# Patient Record
Sex: FEMALE | Race: BLACK OR AFRICAN AMERICAN | Employment: FULL TIME | URBAN - METROPOLITAN AREA
[De-identification: names, ages, dates, MRNs, and addresses within clinical notes are randomized per-mention and may not be internally consistent; named-entity substitution may affect disease eponyms.]

---

## 2017-02-23 ENCOUNTER — LAB ENCOUNTER (OUTPATIENT)
Dept: LAB | Facility: HOSPITAL | Age: 52
End: 2017-02-23
Attending: INTERNAL MEDICINE
Payer: COMMERCIAL

## 2017-02-23 ENCOUNTER — OFFICE VISIT (OUTPATIENT)
Dept: INTERNAL MEDICINE CLINIC | Facility: CLINIC | Age: 52
End: 2017-02-23

## 2017-02-23 VITALS
WEIGHT: 222 LBS | DIASTOLIC BLOOD PRESSURE: 74 MMHG | HEIGHT: 69 IN | RESPIRATION RATE: 18 BRPM | SYSTOLIC BLOOD PRESSURE: 112 MMHG | HEART RATE: 78 BPM | BODY MASS INDEX: 32.88 KG/M2

## 2017-02-23 DIAGNOSIS — Z12.31 VISIT FOR SCREENING MAMMOGRAM: ICD-10-CM

## 2017-02-23 DIAGNOSIS — J45.990 EXERCISE-INDUCED ASTHMA: ICD-10-CM

## 2017-02-23 DIAGNOSIS — Z00.00 ROUTINE HEALTH MAINTENANCE: ICD-10-CM

## 2017-02-23 DIAGNOSIS — N94.6 DYSMENORRHEA: ICD-10-CM

## 2017-02-23 DIAGNOSIS — I10 ESSENTIAL HYPERTENSION: Primary | ICD-10-CM

## 2017-02-23 PROBLEM — Z52.4 KIDNEY DONOR: Status: ACTIVE | Noted: 2017-02-23

## 2017-02-23 LAB
ALBUMIN SERPL BCP-MCNC: 3.6 G/DL (ref 3.5–4.8)
ALBUMIN/GLOB SERPL: 1.1 {RATIO} (ref 1–2)
ALP SERPL-CCNC: 58 U/L (ref 32–100)
ALT SERPL-CCNC: 13 U/L (ref 14–54)
ANION GAP SERPL CALC-SCNC: 7 MMOL/L (ref 0–18)
AST SERPL-CCNC: 17 U/L (ref 15–41)
BASOPHILS # BLD: 0 K/UL (ref 0–0.2)
BASOPHILS NFR BLD: 1 %
BILIRUB SERPL-MCNC: 0.8 MG/DL (ref 0.3–1.2)
BUN SERPL-MCNC: 8 MG/DL (ref 8–20)
BUN/CREAT SERPL: 7.8 (ref 10–20)
CALCIUM SERPL-MCNC: 9 MG/DL (ref 8.5–10.5)
CHLORIDE SERPL-SCNC: 106 MMOL/L (ref 95–110)
CHOLEST SERPL-MCNC: 121 MG/DL (ref 110–200)
CO2 SERPL-SCNC: 26 MMOL/L (ref 22–32)
CREAT SERPL-MCNC: 1.03 MG/DL (ref 0.5–1.5)
EOSINOPHIL # BLD: 0.2 K/UL (ref 0–0.7)
EOSINOPHIL NFR BLD: 2 %
ERYTHROCYTE [DISTWIDTH] IN BLOOD BY AUTOMATED COUNT: 18 % (ref 11–15)
GLOBULIN PLAS-MCNC: 3.4 G/DL (ref 2.5–3.7)
GLUCOSE SERPL-MCNC: 91 MG/DL (ref 70–99)
HCT VFR BLD AUTO: 33.2 % (ref 35–48)
HDLC SERPL-MCNC: 38 MG/DL
HGB BLD-MCNC: 10.4 G/DL (ref 12–16)
LDLC SERPL CALC-MCNC: 70 MG/DL (ref 0–99)
LYMPHOCYTES # BLD: 2.4 K/UL (ref 1–4)
LYMPHOCYTES NFR BLD: 34 %
MCH RBC QN AUTO: 23.6 PG (ref 27–32)
MCHC RBC AUTO-ENTMCNC: 31.3 G/DL (ref 32–37)
MCV RBC AUTO: 75.3 FL (ref 80–100)
MONOCYTES # BLD: 0.6 K/UL (ref 0–1)
MONOCYTES NFR BLD: 8 %
NEUTROPHILS # BLD AUTO: 3.9 K/UL (ref 1.8–7.7)
NEUTROPHILS NFR BLD: 55 %
NONHDLC SERPL-MCNC: 83 MG/DL
OSMOLALITY UR CALC.SUM OF ELEC: 286 MOSM/KG (ref 275–295)
PLATELET # BLD AUTO: 284 K/UL (ref 140–400)
PMV BLD AUTO: 9 FL (ref 7.4–10.3)
POTASSIUM SERPL-SCNC: 4 MMOL/L (ref 3.3–5.1)
PROT SERPL-MCNC: 7 G/DL (ref 5.9–8.4)
RBC # BLD AUTO: 4.41 M/UL (ref 3.7–5.4)
SODIUM SERPL-SCNC: 139 MMOL/L (ref 136–144)
TRIGL SERPL-MCNC: 66 MG/DL (ref 1–149)
TSH SERPL-ACNC: 1.78 UIU/ML (ref 0.34–5.6)
WBC # BLD AUTO: 7.1 K/UL (ref 4–11)

## 2017-02-23 PROCEDURE — 84443 ASSAY THYROID STIM HORMONE: CPT

## 2017-02-23 PROCEDURE — 85025 COMPLETE CBC W/AUTO DIFF WBC: CPT

## 2017-02-23 PROCEDURE — 80061 LIPID PANEL: CPT

## 2017-02-23 PROCEDURE — 99214 OFFICE O/P EST MOD 30 MIN: CPT | Performed by: INTERNAL MEDICINE

## 2017-02-23 PROCEDURE — 36415 COLL VENOUS BLD VENIPUNCTURE: CPT

## 2017-02-23 PROCEDURE — 99212 OFFICE O/P EST SF 10 MIN: CPT | Performed by: INTERNAL MEDICINE

## 2017-02-23 PROCEDURE — 80053 COMPREHEN METABOLIC PANEL: CPT

## 2017-02-23 RX ORDER — ALBUTEROL SULFATE 90 UG/1
2 AEROSOL, METERED RESPIRATORY (INHALATION) EVERY 4 HOURS PRN
Qty: 1 INHALER | Refills: 0 | OUTPATIENT
Start: 2017-02-23 | End: 2018-09-18

## 2017-02-25 ENCOUNTER — TELEPHONE (OUTPATIENT)
Dept: INTERNAL MEDICINE CLINIC | Facility: CLINIC | Age: 52
End: 2017-02-25

## 2017-02-25 DIAGNOSIS — D64.9 ANEMIA, UNSPECIFIED TYPE: Primary | ICD-10-CM

## 2017-03-01 ENCOUNTER — TELEPHONE (OUTPATIENT)
Dept: INTERNAL MEDICINE CLINIC | Facility: CLINIC | Age: 52
End: 2017-03-01

## 2017-03-01 DIAGNOSIS — D64.9 ANEMIA, UNSPECIFIED TYPE: Primary | ICD-10-CM

## 2017-03-01 NOTE — TELEPHONE ENCOUNTER
Pt states given Triage's direct line by on-site staff member to call and inquire about lab results. Pt states she had sent a [employer] message inquiring about results.  Since I was unable to see a MyChart encounter I contacted Epic and they clarified that the

## 2017-03-02 ENCOUNTER — TELEPHONE (OUTPATIENT)
Dept: INTERNAL MEDICINE CLINIC | Facility: CLINIC | Age: 52
End: 2017-03-02

## 2017-03-02 NOTE — TELEPHONE ENCOUNTER
Call pt: Your blood sugar, kidney, liver, electrolytes, and thyroid tests were all normal.  Your cholesterol panel was excellent! You were anemic. It is probably due to the heavy periods which you had before you had your fibroids treated.   I tried to ad

## 2017-03-02 NOTE — TELEPHONE ENCOUNTER
[employer] form faxed and fax confirmation received. Copy mailed to patient and original sent for scanning.

## 2017-03-03 ENCOUNTER — APPOINTMENT (OUTPATIENT)
Dept: LAB | Facility: HOSPITAL | Age: 52
End: 2017-03-03
Attending: INTERNAL MEDICINE
Payer: COMMERCIAL

## 2017-03-03 DIAGNOSIS — D64.9 ANEMIA, UNSPECIFIED TYPE: ICD-10-CM

## 2017-03-03 LAB
FERRITIN SERPL IA-MCNC: 10 NG/ML (ref 11–307)
FOLATE SERPL-MCNC: 14.2 NG/ML
IRON SATN MFR SERPL: 5 % (ref 15–50)
IRON SERPL-MCNC: 17 MCG/DL (ref 28–170)
TIBC SERPL-MCNC: 360 MCG/DL (ref 228–428)
TRANSFERRIN SERPL-MCNC: 273 MG/DL (ref 192–382)
VIT B12 SERPL-MCNC: 310 PG/ML (ref 181–914)

## 2017-03-03 PROCEDURE — 82746 ASSAY OF FOLIC ACID SERUM: CPT

## 2017-03-03 PROCEDURE — 36415 COLL VENOUS BLD VENIPUNCTURE: CPT

## 2017-03-03 PROCEDURE — 82728 ASSAY OF FERRITIN: CPT

## 2017-03-03 PROCEDURE — 82607 VITAMIN B-12: CPT

## 2017-03-03 PROCEDURE — 84466 ASSAY OF TRANSFERRIN: CPT

## 2017-03-03 PROCEDURE — 83540 ASSAY OF IRON: CPT

## 2017-03-20 ENCOUNTER — OFFICE VISIT (OUTPATIENT)
Dept: INTERNAL MEDICINE CLINIC | Facility: CLINIC | Age: 52
End: 2017-03-20

## 2017-03-20 ENCOUNTER — APPOINTMENT (OUTPATIENT)
Dept: LAB | Facility: HOSPITAL | Age: 52
End: 2017-03-20
Attending: INTERNAL MEDICINE
Payer: COMMERCIAL

## 2017-03-20 VITALS
HEIGHT: 69 IN | BODY MASS INDEX: 32.05 KG/M2 | DIASTOLIC BLOOD PRESSURE: 73 MMHG | HEART RATE: 90 BPM | RESPIRATION RATE: 18 BRPM | WEIGHT: 216.38 LBS | SYSTOLIC BLOOD PRESSURE: 118 MMHG

## 2017-03-20 DIAGNOSIS — M79.662 PAIN OF LEFT CALF: ICD-10-CM

## 2017-03-20 DIAGNOSIS — Z86.711 HISTORY OF PULMONARY EMBOLISM: ICD-10-CM

## 2017-03-20 DIAGNOSIS — M79.662 PAIN OF LEFT CALF: Primary | ICD-10-CM

## 2017-03-20 DIAGNOSIS — E53.8 B12 DEFICIENCY: ICD-10-CM

## 2017-03-20 LAB — D DIMER PPP FEU-MCNC: 0.27 MCG/ML (ref ?–0.5)

## 2017-03-20 PROCEDURE — 99214 OFFICE O/P EST MOD 30 MIN: CPT | Performed by: INTERNAL MEDICINE

## 2017-03-20 PROCEDURE — 99212 OFFICE O/P EST SF 10 MIN: CPT | Performed by: INTERNAL MEDICINE

## 2017-03-20 PROCEDURE — 36415 COLL VENOUS BLD VENIPUNCTURE: CPT

## 2017-03-20 PROCEDURE — 85379 FIBRIN DEGRADATION QUANT: CPT

## 2017-03-20 PROCEDURE — 96372 THER/PROPH/DIAG INJ SC/IM: CPT | Performed by: INTERNAL MEDICINE

## 2017-03-20 RX ORDER — FUROSEMIDE 40 MG/1
40 TABLET ORAL AS NEEDED
COMMUNITY
End: 2021-04-27

## 2017-03-20 RX ORDER — CYANOCOBALAMIN 1000 UG/ML
1000 INJECTION INTRAMUSCULAR; SUBCUTANEOUS
Status: SHIPPED | OUTPATIENT
Start: 2017-03-20 | End: 2017-09-16

## 2017-03-20 RX ADMIN — CYANOCOBALAMIN 1000 MCG: 1000 INJECTION INTRAMUSCULAR; SUBCUTANEOUS at 18:36:00

## 2017-03-20 NOTE — PROGRESS NOTES
HPI:    Patient ID: Chidi Holly is a 54year old female. HPI Comments: She flew for 4 hours on Feb 25th and when she got off the plane her feet were swollen. She took a Lasix and it went away the next day. She still had pain in her left calf. time and obese. She appears well-developed. HENT:   Head: Normocephalic and atraumatic. Eyes: Conjunctivae and EOM are normal.   Cardiovascular: Normal rate, regular rhythm and normal heart sounds. Edema not present.   Pulmonary/Chest: Effort normal

## 2017-03-20 NOTE — PATIENT INSTRUCTIONS
Please schedule your lower extremity venous doppler. Call [phone]. Please schedule your mammogram.  Call [phone].

## 2017-03-21 ENCOUNTER — HOSPITAL ENCOUNTER (OUTPATIENT)
Dept: ULTRASOUND IMAGING | Facility: HOSPITAL | Age: 52
Discharge: HOME | End: 2017-03-21
Attending: INTERNAL MEDICINE
Payer: COMMERCIAL

## 2017-03-21 DIAGNOSIS — M79.662 PAIN OF LEFT CALF: ICD-10-CM

## 2017-03-21 DIAGNOSIS — Z86.711 HISTORY OF PULMONARY EMBOLISM: ICD-10-CM

## 2017-03-21 PROCEDURE — 93970 EXTREMITY STUDY: CPT

## 2017-04-17 ENCOUNTER — HOSPITAL ENCOUNTER (OUTPATIENT)
Dept: MAMMOGRAPHY | Facility: HOSPITAL | Age: 52
Discharge: HOME | End: 2017-04-17
Attending: INTERNAL MEDICINE
Payer: COMMERCIAL

## 2017-04-17 DIAGNOSIS — Z12.31 VISIT FOR SCREENING MAMMOGRAM: ICD-10-CM

## 2017-05-04 ENCOUNTER — OFFICE VISIT (OUTPATIENT)
Dept: DERMATOLOGY CLINIC | Facility: CLINIC | Age: 52
End: 2017-05-04

## 2017-05-04 ENCOUNTER — TELEPHONE (OUTPATIENT)
Dept: DERMATOLOGY CLINIC | Facility: CLINIC | Age: 52
End: 2017-05-04

## 2017-05-04 DIAGNOSIS — L28.0 LICHENOID DERMATITIS: Primary | ICD-10-CM

## 2017-05-04 DIAGNOSIS — L30.9 DERMATITIS: ICD-10-CM

## 2017-05-04 PROCEDURE — 99212 OFFICE O/P EST SF 10 MIN: CPT | Performed by: DERMATOLOGY

## 2017-05-04 PROCEDURE — 99202 OFFICE O/P NEW SF 15 MIN: CPT | Performed by: DERMATOLOGY

## 2017-05-04 RX ORDER — DOXEPIN HYDROCHLORIDE 50 MG/G
1 CREAM TOPICAL 3 TIMES DAILY
Qty: 30 G | Refills: 2 | Status: SHIPPED | OUTPATIENT
Start: 2017-05-04 | End: 2020-02-04

## 2017-05-04 RX ORDER — CLOBETASOL PROPIONATE 0.5 MG/G
1 CREAM TOPICAL 2 TIMES DAILY
Qty: 45 G | Refills: 1 | Status: SHIPPED | OUTPATIENT
Start: 2017-05-04 | End: 2018-05-04

## 2017-05-04 NOTE — TELEPHONE ENCOUNTER
Pt called inquiring if Sharath Mendes has a savings coupon. Coupon found, pt states she will  at [facility]  tomorrow 5/5/2017.

## 2017-05-12 ENCOUNTER — NURSE ONLY (OUTPATIENT)
Dept: INTERNAL MEDICINE CLINIC | Facility: CLINIC | Age: 52
End: 2017-05-12

## 2017-05-12 DIAGNOSIS — E53.8 VITAMIN B12 DEFICIENCY: Primary | ICD-10-CM

## 2017-05-12 PROCEDURE — 96372 THER/PROPH/DIAG INJ SC/IM: CPT | Performed by: INTERNAL MEDICINE

## 2017-05-12 RX ADMIN — CYANOCOBALAMIN 1000 MCG: 1000 INJECTION INTRAMUSCULAR; SUBCUTANEOUS at 15:28:00

## 2017-05-12 NOTE — PROGRESS NOTES
PATIENT HERE FOR SCHEDULED NURSE VISIT FOR VITAMIN B12 INJECTION ORDERED PER DR MARTIN. VITAMIN B12 1000 MCG GIVEN IM LEFT DELTOID LOT# [id number] EXP 8/2018, PATIENT TOLERATED VACCINE NO ADVERSE REACTIONS NOTED.

## 2017-05-21 NOTE — PROGRESS NOTES
Mckenna Watters is a 54year old female. Patient presents with:  Lesion: New pt presents with pruritic lesions on chest and arms x2 weeks. Pt has had similar issue 1 year ago and was rx'd mometasone furoate ointment with no imporovement. Wheezing. Disp: 1 Inhaler Rfl: 0   spironolactone 50 MG Oral Tab Take 1 tablet by mouth daily. Disp:  Rfl:    TraMADol HCl 50 MG Oral Tab Take 50 mg by mouth every 8 (eight) hours as needed.    Disp:  Rfl:    ibuprofen 800 MG Oral Tab Take 800 mg by mouth e other skin complaints. History, medications, allergies as noted. Nothing new or different no unusual exposures. No changes in soaps, detergents, skin care products. No recent travel. No else at home itching. No recent illnesses.        ROS:   Zacarias Consider Contact allergy in differential.  Consider patch testing. Patient will let us know how they are doing over the next several weeks. Await clinical response to above therapy.       RTC as noted as needed    No orders of the defined types were place

## 2017-05-29 ENCOUNTER — HOSPITAL ENCOUNTER (OUTPATIENT)
Age: 52
Discharge: HOME | End: 2017-05-29
Attending: EMERGENCY MEDICINE
Payer: COMMERCIAL

## 2017-05-29 VITALS
OXYGEN SATURATION: 100 % | SYSTOLIC BLOOD PRESSURE: 130 MMHG | HEART RATE: 96 BPM | TEMPERATURE: 98 F | DIASTOLIC BLOOD PRESSURE: 83 MMHG | RESPIRATION RATE: 18 BRPM

## 2017-05-29 DIAGNOSIS — J40 BRONCHITIS: Primary | ICD-10-CM

## 2017-05-29 PROCEDURE — 99214 OFFICE O/P EST MOD 30 MIN: CPT

## 2017-05-29 PROCEDURE — 94640 AIRWAY INHALATION TREATMENT: CPT

## 2017-05-29 PROCEDURE — 99204 OFFICE O/P NEW MOD 45 MIN: CPT

## 2017-05-29 RX ORDER — ALBUTEROL SULFATE 90 UG/1
2 AEROSOL, METERED RESPIRATORY (INHALATION) EVERY 4 HOURS PRN
Qty: 1 INHALER | Refills: 0 | Status: SHIPPED | OUTPATIENT
Start: 2017-05-29 | End: 2017-06-28

## 2017-05-29 RX ORDER — IPRATROPIUM BROMIDE AND ALBUTEROL SULFATE 2.5; .5 MG/3ML; MG/3ML
3 SOLUTION RESPIRATORY (INHALATION) ONCE
Status: COMPLETED | OUTPATIENT
Start: 2017-05-29 | End: 2017-05-29

## 2017-05-29 RX ORDER — BENZONATATE 200 MG/1
200 CAPSULE ORAL 3 TIMES DAILY PRN
Qty: 21 CAPSULE | Refills: 0 | Status: SHIPPED | OUTPATIENT
Start: 2017-05-29 | End: 2017-06-05

## 2017-05-29 RX ORDER — PREDNISONE 20 MG/1
40 TABLET ORAL DAILY
Qty: 10 TABLET | Refills: 0 | Status: SHIPPED | OUTPATIENT
Start: 2017-05-29 | End: 2017-06-03

## 2017-05-29 NOTE — ED INITIAL ASSESSMENT (HPI)
Pt rpts sob with thick mucus productive cough since Saturday. Using home meds with no improvement. Denies n/v/d, fever or chills. Speaking full sentences without difficulty.

## 2017-05-29 NOTE — ED PROVIDER NOTES
Patient Seen in: [address]    History   Patient presents with:  Dyspnea NICKIE SOB (respiratory)    Stated Complaint: Wheezing, cough    HPI    54year old female with h/o exercise-induced asthma, htn here with cough, sharyn daily. For itchy spot   Vitamin B-12 1000 MCG Oral Tab,  Take 2 tablets (2,000 mcg total) by mouth daily. ibuprofen 800 MG Oral Tab,  Take 800 mg by mouth every 8 (eight) hours as needed.          Family History   Problem Relation Age of Onset   • Cancer 05/29/17 0819 Oral   SpO2 05/29/17 0819 100 %   O2 Device 05/29/17 0819 None (Room air)       Current:/83 mmHg  Pulse 96  Temp(Src) 98.1 °F (36.7 °C) (Oral)  Resp 18  SpO2 100%  LMP 11/28/2016    GENERAL: well developed, well nourished, well hydrated Oral Tab  Take 2 tablets (40 mg total) by mouth daily. Qty: 10 tablet Refills: 0    benzonatate 200 MG Oral Cap  Take 1 capsule (200 mg total) by mouth 3 (three) times daily as needed for cough.   Qty: 21 capsule Refills: 0    !! - Potential duplicate medi

## 2017-06-01 ENCOUNTER — LAB ENCOUNTER (OUTPATIENT)
Dept: LAB | Facility: HOSPITAL | Age: 52
End: 2017-06-01
Attending: INTERNAL MEDICINE
Payer: COMMERCIAL

## 2017-06-01 ENCOUNTER — OFFICE VISIT (OUTPATIENT)
Dept: INTERNAL MEDICINE CLINIC | Facility: CLINIC | Age: 52
End: 2017-06-01

## 2017-06-01 ENCOUNTER — TELEPHONE (OUTPATIENT)
Dept: INTERNAL MEDICINE CLINIC | Facility: CLINIC | Age: 52
End: 2017-06-01

## 2017-06-01 ENCOUNTER — HOSPITAL ENCOUNTER (OUTPATIENT)
Dept: GENERAL RADIOLOGY | Facility: HOSPITAL | Age: 52
Discharge: HOME | End: 2017-06-01
Attending: INTERNAL MEDICINE
Payer: COMMERCIAL

## 2017-06-01 VITALS
SYSTOLIC BLOOD PRESSURE: 128 MMHG | HEIGHT: 69 IN | DIASTOLIC BLOOD PRESSURE: 82 MMHG | HEART RATE: 78 BPM | TEMPERATURE: 99 F | OXYGEN SATURATION: 98 %

## 2017-06-01 DIAGNOSIS — J18.9 CAP (COMMUNITY ACQUIRED PNEUMONIA): ICD-10-CM

## 2017-06-01 DIAGNOSIS — J18.9 CAP (COMMUNITY ACQUIRED PNEUMONIA): Primary | ICD-10-CM

## 2017-06-01 PROCEDURE — 85025 COMPLETE CBC W/AUTO DIFF WBC: CPT

## 2017-06-01 PROCEDURE — 36415 COLL VENOUS BLD VENIPUNCTURE: CPT

## 2017-06-01 PROCEDURE — 99213 OFFICE O/P EST LOW 20 MIN: CPT | Performed by: INTERNAL MEDICINE

## 2017-06-01 PROCEDURE — 99212 OFFICE O/P EST SF 10 MIN: CPT | Performed by: INTERNAL MEDICINE

## 2017-06-01 PROCEDURE — 71020 XR CHEST PA + LAT CHEST (CPT=71020): CPT | Performed by: INTERNAL MEDICINE

## 2017-06-01 RX ORDER — PROMETHAZINE HYDROCHLORIDE AND CODEINE PHOSPHATE 6.25; 1 MG/5ML; MG/5ML
2.5 SYRUP ORAL EVERY 4 HOURS PRN
Qty: 140 ML | Refills: 0 | Status: SHIPPED | OUTPATIENT
Start: 2017-06-01 | End: 2017-06-29 | Stop reason: ALTCHOICE

## 2017-06-01 RX ORDER — AZITHROMYCIN 250 MG/1
TABLET, FILM COATED ORAL
Qty: 6 TABLET | Refills: 0 | Status: SHIPPED | OUTPATIENT
Start: 2017-06-01 | End: 2017-06-29 | Stop reason: ALTCHOICE

## 2017-06-01 NOTE — TELEPHONE ENCOUNTER
Pt states wheezing, chest pain with cough, coughing up yellow mucus. Pt states was seen in immediate care 5/29 and has not got any better.

## 2017-06-01 NOTE — TELEPHONE ENCOUNTER
Actions Requested: Appt today at 9:40am with RF at [facility]. Situation/Background   Problem: productive cough (thick yellow sputum--was clear before)   Onset: Saturday   Associated Symptoms: Dx with bronchitis 5/29 at [facility] and not much improvement.  Wheezing at t coughing  * Difficulty breathing  * Passed out (i.e., fainted, collapsed and was not responding)  * Patient sounds very sick or weak to the triager  * Coughed up > 1 tablespoon (15 ml) blood (Exception: blood-tinged sputum)  * Fever > 103 F (39.4 C)  * Fev

## 2017-06-01 NOTE — PROGRESS NOTES
Works at [address] [place] [address]  Was in [address] [place] [address] 3 d ago for cough given albuterol not improving cough now productive  Temp low grade   Feels fatigued  Blood pressure 128/82, pulse 78, temperature 99.2 °F (37.3 °C), temperature source Oral, height 5' 9\" (1.753 m), last menstrual MCG Oral Tab, Take 2 tablets (2,000 mcg total) by mouth daily. , Disp: 30 tablet, Rfl: 11  •  Albuterol Sulfate HFA (PROAIR HFA) 108 (90 Base) MCG/ACT Inhalation Aero Soln, Inhale 2 puffs into the lungs every 4 (four) hours as needed for Wheezing., Disp: 1

## 2017-06-03 ENCOUNTER — CHARTING TRANS (OUTPATIENT)
Dept: OTHER | Age: 52
End: 2017-06-03

## 2017-06-03 ENCOUNTER — LAB SERVICES (OUTPATIENT)
Dept: OTHER | Age: 52
End: 2017-06-03

## 2017-06-05 ENCOUNTER — CHARTING TRANS (OUTPATIENT)
Dept: OTHER | Age: 52
End: 2017-06-05

## 2017-06-05 LAB
C TRACH RRNA SPEC QL NAA+PROBE: NEGATIVE
N GONORRHOEA RRNA SPEC QL NAA+PROBE: NEGATIVE
SPECIMEN SOURCE: NORMAL
SPECIMEN SOURCE: NORMAL
T VAGINALIS RRNA SPEC QL NAA+PROBE: NEGATIVE

## 2017-06-14 ENCOUNTER — NURSE ONLY (OUTPATIENT)
Dept: INTERNAL MEDICINE CLINIC | Facility: CLINIC | Age: 52
End: 2017-06-14

## 2017-06-14 DIAGNOSIS — E53.8 VITAMIN B 12 DEFICIENCY: Primary | ICD-10-CM

## 2017-06-14 PROCEDURE — 96372 THER/PROPH/DIAG INJ SC/IM: CPT | Performed by: INTERNAL MEDICINE

## 2017-06-14 RX ADMIN — CYANOCOBALAMIN 1000 MCG: 1000 INJECTION INTRAMUSCULAR; SUBCUTANEOUS at 09:42:00

## 2017-06-14 NOTE — PROGRESS NOTES
Here for B 12 injection name and birth date confirmed. Injection given in right deltoid and patient tolerated it well.

## 2017-06-20 ENCOUNTER — TELEPHONE (OUTPATIENT)
Dept: INTERNAL MEDICINE CLINIC | Facility: CLINIC | Age: 52
End: 2017-06-20

## 2017-06-20 NOTE — TELEPHONE ENCOUNTER
Spoke to pt to reschedule appt as  out of office. Offered same day appt, pt is not currently having symptoms, would prefer to see Dr. Thelma Erickson next week. Rescheduled for 6/28/17, pt advised to call back if symptoms worsen or if she has concerns/questions.  Sh

## 2017-06-29 ENCOUNTER — OFFICE VISIT (OUTPATIENT)
Dept: INTERNAL MEDICINE CLINIC | Facility: CLINIC | Age: 52
End: 2017-06-29

## 2017-06-29 VITALS
WEIGHT: 216 LBS | HEIGHT: 69 IN | HEART RATE: 74 BPM | DIASTOLIC BLOOD PRESSURE: 76 MMHG | SYSTOLIC BLOOD PRESSURE: 119 MMHG | BODY MASS INDEX: 31.99 KG/M2

## 2017-06-29 DIAGNOSIS — R11.2 NON-INTRACTABLE VOMITING WITH NAUSEA, UNSPECIFIED VOMITING TYPE: Primary | ICD-10-CM

## 2017-06-29 DIAGNOSIS — Z52.4 KIDNEY DONOR: ICD-10-CM

## 2017-06-29 DIAGNOSIS — J45.990 EXERCISE-INDUCED ASTHMA: ICD-10-CM

## 2017-06-29 PROCEDURE — 99212 OFFICE O/P EST SF 10 MIN: CPT | Performed by: INTERNAL MEDICINE

## 2017-06-29 PROCEDURE — 99214 OFFICE O/P EST MOD 30 MIN: CPT | Performed by: INTERNAL MEDICINE

## 2017-06-29 RX ORDER — PANTOPRAZOLE SODIUM 40 MG/1
40 TABLET, DELAYED RELEASE ORAL
Qty: 30 TABLET | Refills: 5 | Status: SHIPPED | OUTPATIENT
Start: 2017-06-29 | End: 2017-07-13

## 2017-06-29 NOTE — PROGRESS NOTES
HPI:    Patient ID: Harpal Lee is a 54year old female. She has been having episodes of emesis without warning. Once a month or so over the past 4 months. Vomiting    This is a new problem. The current episode started more than 1 year ago. Vaccine           Comment:Respiratory Problems   PHYSICAL EXAM:   Physical Exam   Nursing note and vitals reviewed. Constitutional: She is oriented to person, place, and time. She appears well-developed and well-nourished.    HENT:   Head: Normocephalic a

## 2017-06-30 ENCOUNTER — LAB ENCOUNTER (OUTPATIENT)
Dept: LAB | Facility: HOSPITAL | Age: 52
End: 2017-06-30
Attending: INTERNAL MEDICINE
Payer: COMMERCIAL

## 2017-06-30 DIAGNOSIS — R11.2 NON-INTRACTABLE VOMITING WITH NAUSEA, UNSPECIFIED VOMITING TYPE: ICD-10-CM

## 2017-06-30 LAB
ALBUMIN SERPL BCP-MCNC: 3.6 G/DL (ref 3.5–4.8)
ALP SERPL-CCNC: 68 U/L (ref 32–100)
ALT SERPL-CCNC: 15 U/L (ref 14–54)
ANION GAP SERPL CALC-SCNC: 9 MMOL/L (ref 0–18)
AST SERPL-CCNC: 22 U/L (ref 15–41)
BASOPHILS # BLD: 0 K/UL (ref 0–0.2)
BASOPHILS NFR BLD: 1 %
BILIRUB DIRECT SERPL-MCNC: 0.2 MG/DL (ref 0–0.2)
BILIRUB SERPL-MCNC: 0.8 MG/DL (ref 0.3–1.2)
BUN SERPL-MCNC: 7 MG/DL (ref 8–20)
BUN/CREAT SERPL: 6.3 (ref 10–20)
CALCIUM SERPL-MCNC: 9 MG/DL (ref 8.5–10.5)
CHLORIDE SERPL-SCNC: 104 MMOL/L (ref 95–110)
CO2 SERPL-SCNC: 26 MMOL/L (ref 22–32)
CREAT SERPL-MCNC: 1.12 MG/DL (ref 0.5–1.5)
EOSINOPHIL # BLD: 0.1 K/UL (ref 0–0.7)
EOSINOPHIL NFR BLD: 2 %
ERYTHROCYTE [DISTWIDTH] IN BLOOD BY AUTOMATED COUNT: 18.2 % (ref 11–15)
GLUCOSE SERPL-MCNC: 91 MG/DL (ref 70–99)
HCT VFR BLD AUTO: 35.8 % (ref 35–48)
HGB BLD-MCNC: 11.1 G/DL (ref 12–16)
LYMPHOCYTES # BLD: 2.3 K/UL (ref 1–4)
LYMPHOCYTES NFR BLD: 31 %
MAGNESIUM SERPL-MCNC: 2.3 MG/DL (ref 1.8–2.5)
MCH RBC QN AUTO: 23.8 PG (ref 27–32)
MCHC RBC AUTO-ENTMCNC: 31 G/DL (ref 32–37)
MCV RBC AUTO: 76.9 FL (ref 80–100)
MONOCYTES # BLD: 0.7 K/UL (ref 0–1)
MONOCYTES NFR BLD: 10 %
NEUTROPHILS # BLD AUTO: 4.1 K/UL (ref 1.8–7.7)
NEUTROPHILS NFR BLD: 57 %
OSMOLALITY UR CALC.SUM OF ELEC: 286 MOSM/KG (ref 275–295)
PLATELET # BLD AUTO: 268 K/UL (ref 140–400)
PMV BLD AUTO: 8.8 FL (ref 7.4–10.3)
POTASSIUM SERPL-SCNC: 4.3 MMOL/L (ref 3.3–5.1)
PROT SERPL-MCNC: 7.2 G/DL (ref 5.9–8.4)
RBC # BLD AUTO: 4.66 M/UL (ref 3.7–5.4)
SODIUM SERPL-SCNC: 139 MMOL/L (ref 136–144)
WBC # BLD AUTO: 7.3 K/UL (ref 4–11)

## 2017-06-30 PROCEDURE — 80048 BASIC METABOLIC PNL TOTAL CA: CPT

## 2017-06-30 PROCEDURE — 36415 COLL VENOUS BLD VENIPUNCTURE: CPT

## 2017-06-30 PROCEDURE — 85025 COMPLETE CBC W/AUTO DIFF WBC: CPT

## 2017-06-30 PROCEDURE — 83735 ASSAY OF MAGNESIUM: CPT

## 2017-06-30 PROCEDURE — 80076 HEPATIC FUNCTION PANEL: CPT

## 2017-07-05 ENCOUNTER — TELEPHONE (OUTPATIENT)
Dept: INTERNAL MEDICINE CLINIC | Facility: CLINIC | Age: 52
End: 2017-07-05

## 2017-07-05 RX ORDER — ONDANSETRON 4 MG/1
4 TABLET, FILM COATED ORAL EVERY 8 HOURS PRN
Qty: 20 TABLET | Refills: 2 | Status: SHIPPED | OUTPATIENT
Start: 2017-07-05 | End: 2018-02-27

## 2017-07-05 NOTE — TELEPHONE ENCOUNTER
Actions Requested: Patient was seen 6/29/17 for nausea/vomiting with LV. Asking if anti-nausea can be sent to verified to pharmacy.    Problem: Nausea   Onset and Timing: LOV 6/29/17 for issue   Associated Symptoms: Nausea that will lead to vomiting patient

## 2017-07-05 NOTE — TELEPHONE ENCOUNTER
Left a detailed message that the medication she requested has been send to her pharmacy on file can call us back with questions or concerns. Thanks

## 2017-07-06 ENCOUNTER — HOSPITAL ENCOUNTER (OUTPATIENT)
Dept: ULTRASOUND IMAGING | Facility: HOSPITAL | Age: 52
Discharge: HOME | End: 2017-07-06
Attending: INTERNAL MEDICINE
Payer: COMMERCIAL

## 2017-07-06 DIAGNOSIS — R11.2 NON-INTRACTABLE VOMITING WITH NAUSEA, UNSPECIFIED VOMITING TYPE: ICD-10-CM

## 2017-07-06 PROCEDURE — 76700 US EXAM ABDOM COMPLETE: CPT | Performed by: INTERNAL MEDICINE

## 2017-07-09 ENCOUNTER — TELEPHONE (OUTPATIENT)
Dept: INTERNAL MEDICINE CLINIC | Facility: CLINIC | Age: 52
End: 2017-07-09

## 2017-07-09 NOTE — TELEPHONE ENCOUNTER
Call pt. Her ultrasound is normal.  I recommend that she one of our gastroenterologists  She can call [phone] to schedule an appointment. They are all excellent. She can see whomever is available.

## 2017-07-10 NOTE — TELEPHONE ENCOUNTER
Spoke with patient and relayed her u/s results. Patient advised of 's recommendation. Patient given the number to call the GI department for an appointment.

## 2017-07-13 ENCOUNTER — OFFICE VISIT (OUTPATIENT)
Dept: GASTROENTEROLOGY | Facility: CLINIC | Age: 52
End: 2017-07-13

## 2017-07-13 ENCOUNTER — TELEPHONE (OUTPATIENT)
Dept: GASTROENTEROLOGY | Facility: CLINIC | Age: 52
End: 2017-07-13

## 2017-07-13 VITALS
SYSTOLIC BLOOD PRESSURE: 113 MMHG | HEART RATE: 80 BPM | HEIGHT: 69.5 IN | TEMPERATURE: 99 F | DIASTOLIC BLOOD PRESSURE: 77 MMHG

## 2017-07-13 DIAGNOSIS — R11.2 NAUSEA AND VOMITING, INTRACTABILITY OF VOMITING NOT SPECIFIED, UNSPECIFIED VOMITING TYPE: Primary | ICD-10-CM

## 2017-07-13 PROCEDURE — 99243 OFF/OP CNSLTJ NEW/EST LOW 30: CPT | Performed by: PHYSICIAN ASSISTANT

## 2017-07-13 PROCEDURE — 99212 OFFICE O/P EST SF 10 MIN: CPT | Performed by: PHYSICIAN ASSISTANT

## 2017-07-13 NOTE — TELEPHONE ENCOUNTER
Scheduled for:  [phone]  Provider Name: Kesha Vincent  Date:9/5/17    Location:  [address] [place] [address]  Sedation: IV SEDATION   Time:  0930 AM/ ARRIVAL 0830 AM  Prep:EGD-NPO AFTER MIDNIGHT  Meds/Allergies Reconciled?:  Physician reviewed   Diagnosis with codes:  Nausea and vomiting

## 2017-07-13 NOTE — H&P
[address] [address] Gastroenterology                                                                                                  Clinic History and Physical     Pa she is requesting very light sedation for her procedure   - No known history of sleep apnea. Pertinent Family Hx:  - No family hx of esophageal, gastric or colon cancer  - No family history of IBD. Prior endoscopies:  - Dr. Lillian Baldwin at [facility] perfor Aero Soln Inhale 2 puffs into the lungs every 4 (four) hours as needed for Wheezing. Disp: 1 Inhaler Rfl: 0   spironolactone 50 MG Oral Tab Take 1 tablet by mouth daily.  Disp:  Rfl:    TraMADol HCl 50 MG Oral Tab Take 50 mg by mouth every 8 (eight) hours a jaundice  Ext: no cyanosis, clubbing or edema is evident. Neuro: Alert and interactive, and patient is having movements of all 4 extremities     Nursing notes and vitals reviewed.      Labs/Imaging:     Patient's labs and imaging were reviewed and discuss symptom diary  -Avoid classic heartburn triggers  -Zofran PRN  -ER s&sxs reviewed with patient who expressed verbal understanding in OV    Orders This Visit:  No orders of the defined types were placed in this encounter.       Meds This Visit:    No prescri

## 2017-07-25 ENCOUNTER — NURSE ONLY (OUTPATIENT)
Dept: INTERNAL MEDICINE CLINIC | Facility: CLINIC | Age: 52
End: 2017-07-25

## 2017-07-25 DIAGNOSIS — E53.8 VITAMIN B 12 DEFICIENCY: ICD-10-CM

## 2017-07-25 PROCEDURE — 96372 THER/PROPH/DIAG INJ SC/IM: CPT | Performed by: INTERNAL MEDICINE

## 2017-07-25 RX ADMIN — CYANOCOBALAMIN 1000 MCG: 1000 INJECTION INTRAMUSCULAR; SUBCUTANEOUS at 09:14:00

## 2017-07-25 NOTE — PROGRESS NOTES
Here for B12 injection name and birth date confirmed. B 12 injection given in right deltoid and patient tolerated it well.

## 2017-08-09 ENCOUNTER — TELEPHONE (OUTPATIENT)
Dept: OBGYN CLINIC | Facility: CLINIC | Age: 52
End: 2017-08-09

## 2017-08-09 ENCOUNTER — OFFICE VISIT (OUTPATIENT)
Dept: OBGYN CLINIC | Facility: CLINIC | Age: 52
End: 2017-08-09

## 2017-08-09 VITALS — HEIGHT: 69 IN | SYSTOLIC BLOOD PRESSURE: 119 MMHG | HEART RATE: 83 BPM | DIASTOLIC BLOOD PRESSURE: 83 MMHG

## 2017-08-09 DIAGNOSIS — N76.0 RECURRENT VAGINITIS: Primary | ICD-10-CM

## 2017-08-09 PROCEDURE — 99203 OFFICE O/P NEW LOW 30 MIN: CPT | Performed by: OBSTETRICS & GYNECOLOGY

## 2017-08-09 NOTE — PROGRESS NOTES
HPI:    Patient ID: Becky Pichardo is a 54year old female. HPI  New patient GYN consultation  Reason for visit recurrent vaginitis. She states that since June she has had 3 episodes of yeast vaginitis.   She has been treated with Diflucan 150 mg ta [Metronidazo*    Hives  Flu Virus Vaccine           Comment:Respiratory Problems    HISTORY:  Past Medical History:   Diagnosis Date   • Abnormal mammogram of right breast     Had a biopsy    • Asthma    • Essential hypertension       Past Surgical History

## 2017-08-11 ENCOUNTER — TELEPHONE (OUTPATIENT)
Dept: OBGYN CLINIC | Facility: CLINIC | Age: 52
End: 2017-08-11

## 2017-08-11 LAB
GENITAL VAGINOSIS SCREEN: NEGATIVE
TRICHOMONAS SCREEN: NEGATIVE

## 2017-08-11 NOTE — TELEPHONE ENCOUNTER
Pt informed of result and rx, pt requested for rx to be sent to    [facility] on [address] [place] [address] [address]  Rx cancelled in [facility] and called it in to requested pharmacy.

## 2017-09-05 ENCOUNTER — SURGERY (OUTPATIENT)
Age: 52
End: 2017-09-05

## 2017-09-05 ENCOUNTER — HOSPITAL ENCOUNTER (OUTPATIENT)
Facility: HOSPITAL | Age: 52
Setting detail: HOSPITAL OUTPATIENT SURGERY
Discharge: HOME | End: 2017-09-05
Attending: INTERNAL MEDICINE | Admitting: INTERNAL MEDICINE
Payer: COMMERCIAL

## 2017-09-05 DIAGNOSIS — R11.2 NAUSEA AND VOMITING: ICD-10-CM

## 2017-09-05 LAB — B-HCG UR QL: NEGATIVE

## 2017-09-05 PROCEDURE — 0DB98ZX EXCISION OF DUODENUM, VIA NATURAL OR ARTIFICIAL OPENING ENDOSCOPIC, DIAGNOSTIC: ICD-10-PCS | Performed by: INTERNAL MEDICINE

## 2017-09-05 PROCEDURE — 0DB68ZX EXCISION OF STOMACH, VIA NATURAL OR ARTIFICIAL OPENING ENDOSCOPIC, DIAGNOSTIC: ICD-10-PCS | Performed by: INTERNAL MEDICINE

## 2017-09-05 PROCEDURE — 99152 MOD SED SAME PHYS/QHP 5/>YRS: CPT | Performed by: INTERNAL MEDICINE

## 2017-09-05 PROCEDURE — 0DB18ZX EXCISION OF UPPER ESOPHAGUS, VIA NATURAL OR ARTIFICIAL OPENING ENDOSCOPIC, DIAGNOSTIC: ICD-10-PCS | Performed by: INTERNAL MEDICINE

## 2017-09-05 PROCEDURE — 43239 EGD BIOPSY SINGLE/MULTIPLE: CPT | Performed by: INTERNAL MEDICINE

## 2017-09-05 NOTE — H&P
History & Physical Examination    Patient Name: Randall Perez  MRN: [id number]  [facility]: [id number]  YOB: 1965    Diagnosis: nausea/vomiting, anemia      Facility-Administered Medications Prior to Admission:  cyanocobalamin (VITAMIN B12) 100 Right  No date: NEPHRECTOMY  2015: OTHER      Comment: UFE  Family History   Problem Relation Age of Onset   • Cancer Father      prostate   • Hypertension Father    • Diabetes Mother    • Hypertension Mother    • Cancer Sister      cervical   • Hypertensi

## 2017-09-05 NOTE — OPERATIVE REPORT
ESOPHAGOGASTRODUODENOSCOPY (EGD) REPORT    Dian Kyle    YOLANDA 1965 Age 50year old   PCP Regine Knapp MD Endoscopist Nori Mendoza MD     Date of procedure: 17    Procedure: EGD w/biopsy    Pre-operative diagnosis: nausea, vomit The duodenal mucosa appeared normal in the 1st portion. The second portion appeared normal, other than a small 4-5mm whitish lymphangiectasia s/p biopsies. Impression:  1.  Mild gastric erythema and incidental finding of a benign duodenal lymphangiectasi

## 2017-09-06 ENCOUNTER — TELEPHONE (OUTPATIENT)
Dept: GASTROENTEROLOGY | Facility: CLINIC | Age: 52
End: 2017-09-06

## 2017-09-06 VITALS
SYSTOLIC BLOOD PRESSURE: 137 MMHG | WEIGHT: 195 LBS | DIASTOLIC BLOOD PRESSURE: 91 MMHG | HEIGHT: 69 IN | OXYGEN SATURATION: 98 % | RESPIRATION RATE: 16 BRPM | HEART RATE: 68 BPM | BODY MASS INDEX: 28.88 KG/M2

## 2017-09-06 RX ORDER — AMOXICILLIN 500 MG/1
1000 TABLET, FILM COATED ORAL 2 TIMES DAILY
Qty: 56 TABLET | Refills: 0 | Status: SHIPPED | OUTPATIENT
Start: 2017-09-06 | End: 2017-09-20

## 2017-09-06 RX ORDER — CLARITHROMYCIN 500 MG/1
500 TABLET, COATED ORAL 2 TIMES DAILY
Qty: 28 TABLET | Refills: 0 | Status: SHIPPED | OUTPATIENT
Start: 2017-09-06 | End: 2017-09-20

## 2017-09-06 RX ORDER — OMEPRAZOLE 40 MG/1
40 CAPSULE, DELAYED RELEASE ORAL 2 TIMES DAILY
Qty: 28 CAPSULE | Refills: 0 | Status: SHIPPED | OUTPATIENT
Start: 2017-09-06 | End: 2017-09-20

## 2017-09-06 NOTE — TELEPHONE ENCOUNTER
EGD biopsies reviewed w/patient. H.pylori gastritis identified. The natural history of H.pylori was reviewed with the patient, as well as possible complications from H.pylori including stomach cancer, gastritis, dyspepsia, anemia and ulcers.  We discussed t

## 2017-09-07 ENCOUNTER — PATIENT MESSAGE (OUTPATIENT)
Dept: GASTROENTEROLOGY | Facility: CLINIC | Age: 52
End: 2017-09-07

## 2017-09-08 RX ORDER — FLUCONAZOLE 150 MG/1
150 TABLET ORAL ONCE
Qty: 1 TABLET | Refills: 1 | Status: SHIPPED | OUTPATIENT
Start: 2017-09-08 | End: 2017-09-08

## 2017-09-08 NOTE — TELEPHONE ENCOUNTER
From: Durga Mendoza  To:  Zeno [place], [place]  Sent: 9/7/2017 11:57 AM CDT  Subject: Prescription Question    Stevo Aggarwal went to pharmacy no rx for diflucan thanks

## 2017-09-08 NOTE — TELEPHONE ENCOUNTER
Pt contacted and reviewed the message below, she was very appreciative and did not have further questions or concerns at this time.

## 2017-09-08 NOTE — TELEPHONE ENCOUNTER
Incoming received from pt stating that she was to receive diflucan prophylactically while on the h.pylori tx.  She states she discussed this w/ both [facility] and [facility] and was told she would receive an RX for it b/c she is susceptible to yeast infections while on a

## 2017-09-21 ENCOUNTER — OFFICE VISIT (OUTPATIENT)
Dept: INTERNAL MEDICINE CLINIC | Facility: CLINIC | Age: 52
End: 2017-09-21

## 2017-09-21 VITALS — DIASTOLIC BLOOD PRESSURE: 85 MMHG | RESPIRATION RATE: 18 BRPM | SYSTOLIC BLOOD PRESSURE: 124 MMHG | HEART RATE: 96 BPM

## 2017-09-21 DIAGNOSIS — L27.0 RASH AS ADVERSE EFFECT OF PENICILLIN: Primary | ICD-10-CM

## 2017-09-21 DIAGNOSIS — T36.0X5A RASH AS ADVERSE EFFECT OF PENICILLIN: Primary | ICD-10-CM

## 2017-09-21 DIAGNOSIS — A04.8 H. PYLORI INFECTION: ICD-10-CM

## 2017-09-21 PROCEDURE — 96372 THER/PROPH/DIAG INJ SC/IM: CPT | Performed by: INTERNAL MEDICINE

## 2017-09-21 PROCEDURE — 99213 OFFICE O/P EST LOW 20 MIN: CPT | Performed by: INTERNAL MEDICINE

## 2017-09-21 RX ORDER — CYANOCOBALAMIN 1000 UG/ML
1000 INJECTION INTRAMUSCULAR; SUBCUTANEOUS ONCE
Status: COMPLETED | OUTPATIENT
Start: 2017-09-21 | End: 2017-09-21

## 2017-09-21 RX ORDER — CYPROHEPTADINE HYDROCHLORIDE 2 MG/5ML
SOLUTION ORAL EVERY 8 HOURS
COMMUNITY
End: 2019-03-05 | Stop reason: ALTCHOICE

## 2017-09-21 RX ORDER — OMEPRAZOLE 40 MG/1
CAPSULE, DELAYED RELEASE ORAL
COMMUNITY
Start: 2017-09-06 | End: 2018-01-02

## 2017-09-21 RX ADMIN — CYANOCOBALAMIN 1000 MCG: 1000 INJECTION INTRAMUSCULAR; SUBCUTANEOUS at 16:58:00

## 2017-09-21 NOTE — ASSESSMENT & PLAN NOTE
Continue lansoprazole and clarithromycin to complete her treatment of H. Pylori. F/u with Dr Kingston Ibrahim.

## 2017-09-21 NOTE — PATIENT INSTRUCTIONS
Take Claritin (loratidine) 10 mg once a day in the morning. Take Benadryl (diphenhydramine) 25 mg 1-2 tabs in the evening--it can cause drowsiness.

## 2017-09-21 NOTE — PROGRESS NOTES
HPI:    Patient ID: Jer De La Garza is a 50year old female. Pt had an EGD and was given a Prevpac. Then she got a rash. No wheezing, SOB, tongue swelling, or swelling in the face. Rash   This is a new problem.  The current episode started in t MG/5ML Oral Syrup Take by mouth every 8 (eight) hours.  Disp:  Rfl:    Omeprazole 40 MG Oral Capsule Delayed Release  Disp:  Rfl:        Allergies:  Amoxicillin               Flagyl [Metronidazo*    Hives  Flu Virus Vaccine           Comment:Respiratory Pro prescriptions requested or ordered in this encounter

## 2017-09-21 NOTE — ASSESSMENT & PLAN NOTE
Of the medications that she is taking, the amoxicillin is the one that is most likely causing the rash. Advised pt to stop amoxicillin. She has 1 day left. Advised Claritin and benadryl prn.

## 2017-09-26 ENCOUNTER — TELEPHONE (OUTPATIENT)
Dept: GASTROENTEROLOGY | Facility: CLINIC | Age: 52
End: 2017-09-26

## 2017-09-26 DIAGNOSIS — A04.8 H. PYLORI INFECTION: Primary | ICD-10-CM

## 2017-09-26 NOTE — TELEPHONE ENCOUNTER
I spoke with patient in office - once she is off of all antibiotics/PPI x 2 weeks, take the stool test. Ashwin Hitchcock had an allergic reaction to the amoxicillin (she did not take one day of the H. Pylori amoxicillin regimen, completed the remaining rest of the

## 2017-10-05 ENCOUNTER — HOSPITAL ENCOUNTER (OUTPATIENT)
Dept: MAMMOGRAPHY | Facility: HOSPITAL | Age: 52
Discharge: HOME | End: 2017-10-05
Attending: INTERNAL MEDICINE
Payer: COMMERCIAL

## 2017-10-05 PROCEDURE — 77063 BREAST TOMOSYNTHESIS BI: CPT | Performed by: INTERNAL MEDICINE

## 2017-10-05 PROCEDURE — 77067 SCR MAMMO BI INCL CAD: CPT | Performed by: INTERNAL MEDICINE

## 2017-10-06 ENCOUNTER — OFFICE VISIT (OUTPATIENT)
Dept: DERMATOLOGY CLINIC | Facility: CLINIC | Age: 52
End: 2017-10-06

## 2017-10-06 DIAGNOSIS — L30.9 DERMATITIS: Primary | ICD-10-CM

## 2017-10-06 DIAGNOSIS — L50.9 URTICARIA: ICD-10-CM

## 2017-10-06 PROCEDURE — 99213 OFFICE O/P EST LOW 20 MIN: CPT | Performed by: DERMATOLOGY

## 2017-10-06 PROCEDURE — 99212 OFFICE O/P EST SF 10 MIN: CPT | Performed by: DERMATOLOGY

## 2017-10-06 RX ORDER — PREDNISONE 10 MG/1
TABLET ORAL
Qty: 60 TABLET | Refills: 0 | Status: SHIPPED | OUTPATIENT
Start: 2017-10-06 | End: 2017-10-18

## 2017-10-06 RX ORDER — LEVOCETIRIZINE DIHYDROCHLORIDE 5 MG/1
5 TABLET, FILM COATED ORAL EVERY EVENING
Qty: 30 TABLET | Refills: 0 | Status: SHIPPED | OUTPATIENT
Start: 2017-10-06 | End: 2018-01-02

## 2017-10-12 ENCOUNTER — NURSE TRIAGE (OUTPATIENT)
Dept: OTHER | Age: 52
End: 2017-10-12

## 2017-10-12 DIAGNOSIS — R35.0 URINARY FREQUENCY: Primary | ICD-10-CM

## 2017-10-16 ENCOUNTER — TELEPHONE (OUTPATIENT)
Dept: INTERNAL MEDICINE CLINIC | Facility: CLINIC | Age: 52
End: 2017-10-16

## 2017-10-16 ENCOUNTER — OFFICE VISIT (OUTPATIENT)
Dept: INTERNAL MEDICINE CLINIC | Facility: CLINIC | Age: 52
End: 2017-10-16

## 2017-10-16 VITALS
SYSTOLIC BLOOD PRESSURE: 102 MMHG | HEIGHT: 69 IN | DIASTOLIC BLOOD PRESSURE: 69 MMHG | RESPIRATION RATE: 18 BRPM | HEART RATE: 90 BPM

## 2017-10-16 DIAGNOSIS — N30.01 ACUTE CYSTITIS WITH HEMATURIA: Primary | ICD-10-CM

## 2017-10-16 PROCEDURE — 99213 OFFICE O/P EST LOW 20 MIN: CPT | Performed by: INTERNAL MEDICINE

## 2017-10-16 PROCEDURE — 81002 URINALYSIS NONAUTO W/O SCOPE: CPT | Performed by: INTERNAL MEDICINE

## 2017-10-16 PROCEDURE — 99212 OFFICE O/P EST SF 10 MIN: CPT | Performed by: INTERNAL MEDICINE

## 2017-10-16 RX ORDER — CIPROFLOXACIN 250 MG/1
250 TABLET, FILM COATED ORAL 2 TIMES DAILY
Qty: 10 TABLET | Refills: 0 | Status: SHIPPED | OUTPATIENT
Start: 2017-10-16 | End: 2017-10-21

## 2017-10-16 NOTE — PROGRESS NOTES
Priscilla Driver is a 50year old female.     Patient presents with:  Rash: pt here for eval of a rash last office visit on 05/04/2017 pt was started on amoxicillin about 3 weeks ago started with a rash stopped taking it pt has been off of amoxicillin f breast     Had a biopsy    • Asthma    • Essential hypertension    • Helicobacter positive gastritis 09/2017    on abx therapy   • High blood pressure       Social History:  Smoking status: Never Smoker mammogram of right breast     Had a biopsy    • Asthma    • Essential hypertension    • Helicobacter positive gastritis 09/2017    on abx therapy   • High blood pressure      Past Surgical History:  No date: NEEDLE BIOPSY RIGHT  1999: NEPHRECTOMY Right  No travel. No else at home itching. No recent illnesses. ROS:   Denies any other systemic complaints. No fevers, chills, night sweats, photosensitivity, lymph node swelling. No other skin complaints.       Physical examination:  Well-developed well-n defined types were placed in this encounter.       Meds & Refills for this Visit:   Signed Prescriptions Disp Refills    predniSONE 10 MG Oral Tab 60 tablet 0      Sig: 3 po bid      Levocetirizine Dihydrochloride 5 MG Oral Tab 30 tablet 0      Sig: Take 1

## 2017-10-16 NOTE — PROGRESS NOTES
HPI:    Patient ID: Lucretia Wynne is a 50year old female. Pt c/o urinary symptoms for 5 days. UTI   This is a new problem. The current episode started in the past 7 days. Pertinent negatives include no fever.  She has tried drinking for the sy Tab Take 800 mg by mouth every 8 (eight) hours as needed.    Disp:  Rfl:    predniSONE 10 MG Oral Tab 3 po bid Disp: 60 tablet Rfl: 0       Allergies:  Bactrim [Sulfametho*    Hives  Amoxicillin               Flagyl [Metronidazo*    Hives  Flu Virus Vaccine mouth 2 (two) times daily.

## 2017-10-17 ENCOUNTER — APPOINTMENT (OUTPATIENT)
Dept: LAB | Facility: HOSPITAL | Age: 52
End: 2017-10-17
Attending: PHYSICIAN ASSISTANT
Payer: COMMERCIAL

## 2017-10-17 DIAGNOSIS — A04.8 H. PYLORI INFECTION: ICD-10-CM

## 2017-10-17 PROCEDURE — 87338 HPYLORI STOOL AG IA: CPT

## 2017-10-20 ENCOUNTER — APPOINTMENT (OUTPATIENT)
Dept: OTHER | Facility: HOSPITAL | Age: 52
End: 2017-10-20
Attending: EMERGENCY MEDICINE

## 2017-12-06 ENCOUNTER — OFFICE VISIT (OUTPATIENT)
Dept: INTERNAL MEDICINE CLINIC | Facility: HOSPITAL | Age: 52
End: 2017-12-06
Attending: EMERGENCY MEDICINE

## 2017-12-06 DIAGNOSIS — Z00.00 WELLNESS EXAMINATION: Primary | ICD-10-CM

## 2017-12-06 PROCEDURE — 86762 RUBELLA ANTIBODY: CPT

## 2018-01-02 ENCOUNTER — OFFICE VISIT (OUTPATIENT)
Dept: INTERNAL MEDICINE CLINIC | Facility: CLINIC | Age: 53
End: 2018-01-02

## 2018-01-02 VITALS
TEMPERATURE: 99 F | SYSTOLIC BLOOD PRESSURE: 132 MMHG | DIASTOLIC BLOOD PRESSURE: 72 MMHG | HEIGHT: 69 IN | RESPIRATION RATE: 18 BRPM | HEART RATE: 79 BPM

## 2018-01-02 DIAGNOSIS — J01.10 ACUTE NON-RECURRENT FRONTAL SINUSITIS: Primary | ICD-10-CM

## 2018-01-02 PROCEDURE — 99213 OFFICE O/P EST LOW 20 MIN: CPT | Performed by: PHYSICIAN ASSISTANT

## 2018-01-02 RX ORDER — LEVOFLOXACIN 500 MG/1
500 TABLET, FILM COATED ORAL DAILY
Qty: 7 TABLET | Refills: 0 | Status: SHIPPED | OUTPATIENT
Start: 2018-01-02 | End: 2018-01-25 | Stop reason: ALTCHOICE

## 2018-01-02 RX ORDER — FLUTICASONE PROPIONATE 50 MCG
2 SPRAY, SUSPENSION (ML) NASAL DAILY
Qty: 1 BOTTLE | Refills: 2 | Status: SHIPPED | OUTPATIENT
Start: 2018-01-02 | End: 2020-02-04 | Stop reason: ALTCHOICE

## 2018-01-02 RX ORDER — IBUPROFEN 800 MG/1
800 TABLET ORAL EVERY 8 HOURS PRN
Qty: 30 TABLET | Refills: 5 | Status: SHIPPED | OUTPATIENT
Start: 2018-01-02 | End: 2020-02-04

## 2018-01-02 NOTE — PROGRESS NOTES
Michael Oneill is a 50year old female. Patient presents with:  Sinus Problem: x5 days      HPI:   Patient presents today complaining of nasal congestion and sinus pain for the last 5 days.  Also having profuse rhinorrhea and difficulty breathing throu 2   furosemide 40 MG Oral Tab Take 40 mg by mouth daily.  Disp:  Rfl:       Past Medical History:   Diagnosis Date   • Abnormal mammogram of right breast     Had a biopsy    • Asthma    • Essential hypertension    • Helicobacter positive gastritis 09/2017 edema.  NUERO: Normal mood and affect. ASSESSMENT AND PLAN:   Diagnoses and all orders for this visit:    Acute non-recurrent frontal sinusitis  Has had sinus pain and nasal congestion for several days and continues to get worse.  Given history of antibi

## 2018-01-15 ENCOUNTER — APPOINTMENT (OUTPATIENT)
Dept: OTHER | Facility: HOSPITAL | Age: 53
End: 2018-01-15
Attending: EMERGENCY MEDICINE

## 2018-01-23 ENCOUNTER — APPOINTMENT (OUTPATIENT)
Dept: OTHER | Facility: HOSPITAL | Age: 53
End: 2018-01-23
Attending: ORTHOPAEDIC SURGERY

## 2018-01-23 ENCOUNTER — CHARTING TRANS (OUTPATIENT)
Dept: OTHER | Age: 53
End: 2018-01-23

## 2018-01-23 ASSESSMENT — PAIN SCALES - GENERAL: PAINLEVEL_OUTOF10: 0

## 2018-01-25 ENCOUNTER — OFFICE VISIT (OUTPATIENT)
Dept: INTERNAL MEDICINE CLINIC | Facility: CLINIC | Age: 53
End: 2018-01-25

## 2018-01-25 VITALS — SYSTOLIC BLOOD PRESSURE: 121 MMHG | HEIGHT: 69 IN | HEART RATE: 90 BPM | DIASTOLIC BLOOD PRESSURE: 79 MMHG

## 2018-01-25 DIAGNOSIS — D64.9 ANEMIA, UNSPECIFIED TYPE: ICD-10-CM

## 2018-01-25 DIAGNOSIS — Z00.00 ROUTINE HEALTH MAINTENANCE: Primary | ICD-10-CM

## 2018-01-25 PROBLEM — N30.01 ACUTE CYSTITIS WITH HEMATURIA: Status: RESOLVED | Noted: 2017-10-16 | Resolved: 2018-01-25

## 2018-01-25 PROBLEM — N94.6 DYSMENORRHEA: Status: RESOLVED | Noted: 2017-02-23 | Resolved: 2018-01-25

## 2018-01-25 PROCEDURE — 99396 PREV VISIT EST AGE 40-64: CPT | Performed by: INTERNAL MEDICINE

## 2018-01-25 NOTE — ASSESSMENT & PLAN NOTE
Unremarkable exam.  She is up-to-date on her colonoscopy  Immunizations were reviewed. Counseled pt regarding diet and exercise. Reviewed labs with pt.

## 2018-01-25 NOTE — PROGRESS NOTES
HPI:    Patient ID: Ade Pruitt is a 50year old female. Pt is here for a physical.    Sometimes her left hand falls asleep during the night. Review of Systems   Constitutional: Negative for chills, diaphoresis and fever.    HENT: Negative 1 tablet by mouth daily. Disp:  Rfl:    TraMADol HCl 50 MG Oral Tab Take 50 mg by mouth every 8 (eight) hours as needed. Disp:  Rfl:    Cyproheptadine HCl 2 MG/5ML Oral Syrup Take by mouth every 8 (eight) hours.  Disp:  Rfl:    Ondansetron HCl (ZOFRAN) 4 has no wheezes. She has no rales. Right breast exhibits no inverted nipple, no mass, no nipple discharge, no skin change and no tenderness. Left breast exhibits no inverted nipple, no mass, no nipple discharge, no skin change and no tenderness.    Abdominal

## 2018-01-30 ENCOUNTER — CHARTING TRANS (OUTPATIENT)
Dept: OTHER | Age: 53
End: 2018-01-30

## 2018-01-31 ENCOUNTER — APPOINTMENT (OUTPATIENT)
Dept: OTHER | Facility: HOSPITAL | Age: 53
End: 2018-01-31
Attending: EMERGENCY MEDICINE

## 2018-02-02 ENCOUNTER — LAB ENCOUNTER (OUTPATIENT)
Dept: LAB | Facility: HOSPITAL | Age: 53
End: 2018-02-02
Attending: INTERNAL MEDICINE
Payer: COMMERCIAL

## 2018-02-02 DIAGNOSIS — D64.9 ANEMIA, UNSPECIFIED TYPE: ICD-10-CM

## 2018-02-02 DIAGNOSIS — Z00.00 ROUTINE HEALTH MAINTENANCE: ICD-10-CM

## 2018-02-02 LAB
ALBUMIN SERPL BCP-MCNC: 3.6 G/DL (ref 3.5–4.8)
ALBUMIN/GLOB SERPL: 1.2 {RATIO} (ref 1–2)
ALP SERPL-CCNC: 62 U/L (ref 32–100)
ALT SERPL-CCNC: 11 U/L (ref 14–54)
ANION GAP SERPL CALC-SCNC: 9 MMOL/L (ref 0–18)
AST SERPL-CCNC: 15 U/L (ref 15–41)
BASOPHILS # BLD: 0 K/UL (ref 0–0.2)
BASOPHILS NFR BLD: 1 %
BILIRUB SERPL-MCNC: 0.7 MG/DL (ref 0.3–1.2)
BUN SERPL-MCNC: 12 MG/DL (ref 8–20)
BUN/CREAT SERPL: 11.7 (ref 10–20)
CALCIUM SERPL-MCNC: 8.8 MG/DL (ref 8.5–10.5)
CHLORIDE SERPL-SCNC: 104 MMOL/L (ref 95–110)
CO2 SERPL-SCNC: 25 MMOL/L (ref 22–32)
CREAT SERPL-MCNC: 1.03 MG/DL (ref 0.5–1.5)
EOSINOPHIL # BLD: 0.2 K/UL (ref 0–0.7)
EOSINOPHIL NFR BLD: 2 %
ERYTHROCYTE [DISTWIDTH] IN BLOOD BY AUTOMATED COUNT: 16.3 % (ref 11–15)
FERRITIN SERPL IA-MCNC: 11 NG/ML (ref 11–307)
GLOBULIN PLAS-MCNC: 3 G/DL (ref 2.5–3.7)
GLUCOSE SERPL-MCNC: 92 MG/DL (ref 70–99)
HCT VFR BLD AUTO: 37.3 % (ref 35–48)
HGB BLD-MCNC: 11.8 G/DL (ref 12–16)
IRON SATN MFR SERPL: 11 % (ref 15–50)
IRON SERPL-MCNC: 36 MCG/DL (ref 28–170)
LYMPHOCYTES # BLD: 2 K/UL (ref 1–4)
LYMPHOCYTES NFR BLD: 25 %
MCH RBC QN AUTO: 25.2 PG (ref 27–32)
MCHC RBC AUTO-ENTMCNC: 31.7 G/DL (ref 32–37)
MCV RBC AUTO: 79.5 FL (ref 80–100)
MONOCYTES # BLD: 0.7 K/UL (ref 0–1)
MONOCYTES NFR BLD: 8 %
NEUTROPHILS # BLD AUTO: 5.1 K/UL (ref 1.8–7.7)
NEUTROPHILS NFR BLD: 64 %
OSMOLALITY UR CALC.SUM OF ELEC: 285 MOSM/KG (ref 275–295)
PLATELET # BLD AUTO: 246 K/UL (ref 140–400)
PMV BLD AUTO: 9 FL (ref 7.4–10.3)
POTASSIUM SERPL-SCNC: 4.2 MMOL/L (ref 3.3–5.1)
PROT SERPL-MCNC: 6.6 G/DL (ref 5.9–8.4)
RBC # BLD AUTO: 4.69 M/UL (ref 3.7–5.4)
SODIUM SERPL-SCNC: 138 MMOL/L (ref 136–144)
TIBC SERPL-MCNC: 341 MCG/DL (ref 228–428)
TRANSFERRIN SERPL-MCNC: 258 MG/DL (ref 192–382)
TSH SERPL-ACNC: 1.98 UIU/ML (ref 0.45–5.33)
VIT B12 SERPL-MCNC: 320 PG/ML (ref 181–914)
WBC # BLD AUTO: 8 K/UL (ref 4–11)

## 2018-02-02 PROCEDURE — 84466 ASSAY OF TRANSFERRIN: CPT

## 2018-02-02 PROCEDURE — 36415 COLL VENOUS BLD VENIPUNCTURE: CPT

## 2018-02-02 PROCEDURE — 83021 HEMOGLOBIN CHROMOTOGRAPHY: CPT

## 2018-02-02 PROCEDURE — 80053 COMPREHEN METABOLIC PANEL: CPT

## 2018-02-02 PROCEDURE — 83020 HEMOGLOBIN ELECTROPHORESIS: CPT

## 2018-02-02 PROCEDURE — 82728 ASSAY OF FERRITIN: CPT

## 2018-02-02 PROCEDURE — 83540 ASSAY OF IRON: CPT

## 2018-02-02 PROCEDURE — 84443 ASSAY THYROID STIM HORMONE: CPT

## 2018-02-02 PROCEDURE — 85025 COMPLETE CBC W/AUTO DIFF WBC: CPT

## 2018-02-02 PROCEDURE — 82607 VITAMIN B-12: CPT

## 2018-02-07 LAB
HGB A2 MFR BLD: 2.3 % (ref 1.5–3.5)
HGB PNL BLD ELPH: 97.7 % (ref 95.5–100)

## 2018-02-13 ENCOUNTER — APPOINTMENT (OUTPATIENT)
Dept: OTHER | Facility: HOSPITAL | Age: 53
End: 2018-02-13
Attending: ORTHOPAEDIC SURGERY

## 2018-02-26 ENCOUNTER — NURSE TRIAGE (OUTPATIENT)
Dept: GASTROENTEROLOGY | Facility: CLINIC | Age: 53
End: 2018-02-26

## 2018-02-26 RX ORDER — LEVOFLOXACIN 500 MG/1
500 TABLET, FILM COATED ORAL DAILY
Qty: 7 TABLET | Refills: 2 | Status: CANCELLED | OUTPATIENT
Start: 2018-02-26

## 2018-02-26 NOTE — TELEPHONE ENCOUNTER
Pt states that she still has 2 refills on zofran but they were at [address] which is closed. Please send to [facility] and call pt when sent.         Current Outpatient Prescriptions:     •  Ondansetron HCl (ZOFRAN) 4 mg tablet, Take 1 tablet (4 mg to

## 2018-02-26 NOTE — TELEPHONE ENCOUNTER
Routed to Dr. Arcadio Valenzuela office staff -as [facility] never filled the zofran for this patient. Please address pt message below, thank you.

## 2018-02-27 RX ORDER — ONDANSETRON 4 MG/1
4 TABLET, FILM COATED ORAL EVERY 8 HOURS PRN
Qty: 20 TABLET | Refills: 2 | Status: SHIPPED | OUTPATIENT
Start: 2018-02-27 | End: 2020-02-04

## 2018-02-27 NOTE — TELEPHONE ENCOUNTER
Action Requested: Summary for Provider     []  Critical Lab, Recommendations Needed  [] Need Additional Advice  []   FYI    []   Need Orders  [x] Need Medications Sent to Pharmacy  []  Other     SUMMARY: Patient stating she needs a new script for zofran se

## 2018-02-28 ENCOUNTER — TELEPHONE (OUTPATIENT)
Dept: INTERNAL MEDICINE CLINIC | Facility: CLINIC | Age: 53
End: 2018-02-28

## 2018-02-28 ENCOUNTER — NURSE ONLY (OUTPATIENT)
Dept: INTERNAL MEDICINE CLINIC | Facility: CLINIC | Age: 53
End: 2018-02-28

## 2018-02-28 ENCOUNTER — OFFICE VISIT (OUTPATIENT)
Dept: GASTROENTEROLOGY | Facility: CLINIC | Age: 53
End: 2018-02-28

## 2018-02-28 VITALS — DIASTOLIC BLOOD PRESSURE: 69 MMHG | SYSTOLIC BLOOD PRESSURE: 112 MMHG | HEART RATE: 81 BPM

## 2018-02-28 DIAGNOSIS — E53.8 B12 DEFICIENCY: Primary | ICD-10-CM

## 2018-02-28 DIAGNOSIS — R11.0 NAUSEA: Primary | ICD-10-CM

## 2018-02-28 PROCEDURE — 99214 OFFICE O/P EST MOD 30 MIN: CPT | Performed by: PHYSICIAN ASSISTANT

## 2018-02-28 PROCEDURE — 96372 THER/PROPH/DIAG INJ SC/IM: CPT | Performed by: INTERNAL MEDICINE

## 2018-02-28 PROCEDURE — 99212 OFFICE O/P EST SF 10 MIN: CPT | Performed by: PHYSICIAN ASSISTANT

## 2018-02-28 RX ORDER — CYANOCOBALAMIN 1000 UG/ML
1000 INJECTION INTRAMUSCULAR; SUBCUTANEOUS ONCE
Status: COMPLETED | OUTPATIENT
Start: 2018-02-28 | End: 2018-02-28

## 2018-02-28 RX ADMIN — CYANOCOBALAMIN 1000 MCG: 1000 INJECTION INTRAMUSCULAR; SUBCUTANEOUS at 14:34:00

## 2018-02-28 NOTE — PROGRESS NOTES
Routing comment pasted below. Added to recall calendar for 3 year follow up.     Recall c-scope for 3/2021 please - GI RNs (Routing comment)

## 2018-02-28 NOTE — PATIENT INSTRUCTIONS
1. Discontinue any vitamins or supplements that you take on an empty stomach. 2. Discontinue the elliptical for one week and see how you do. 3. If you are still having symptoms, we can go ahead with the NM Gastric Emptying Scan.      4. Zofran as need

## 2018-02-28 NOTE — TELEPHONE ENCOUNTER
Ok for monthly B12 injections for 3 injections total.  Recheck level after that. Her level will go too high if we do it for a year.

## 2018-02-28 NOTE — PROGRESS NOTES
[facility] Follow-up Visit    Lesly Vetrone suggestion, nor iron. Denies blood loss.      7/13/17 Initial OV:    - Nausea began abruptly + March 2017 with 3-4 episodeso f non-bloody emesis  - PPI w/o relief of symptoms  - Alternating bowel habits which she has attributed to IBS with symptoms Prescriptions:  Ondansetron HCl (ZOFRAN) 4 mg tablet Take 1 tablet (4 mg total) by mouth every 8 (eight) hours as needed for Nausea. Disp: 20 tablet Rfl: 2   Fluticasone Propionate 50 MCG/ACT Nasal Suspension 2 sprays by Each Nare route daily.  Disp: 1 [facility].      Gen: patient appears comfortable and in no acute discomfort  HEENT: conjunctiva pink, the sclera appears anicteric  CV: regular rate and rhythm  Lung: moves air well; no labored breathing  Abdomen: soft, non-tender, non-distended abdomen with + patient describes - no longer emetic episodes. Possible gastroparesis, however sporadic symptoms with normal fasting glucose in February 2018. No longer vomiting. Denies drug use or eating late at night. No head trauma reported.    #GERD: controlled with an

## 2018-02-28 NOTE — PROGRESS NOTES
Patient present for Vitamin B12 injection. Dr Tommy Goltz gave verbal order to give this dose x1 then patient has to take oral B12 supplements. Patient refuses to take oral B12 supplements. Dr Tommy Goltz sent T.E.  For patient's request to do Vitmamin B12 injectio

## 2018-02-28 NOTE — TELEPHONE ENCOUNTER
Informed by pt that she is refusing to take oral b12. Is requesting to come in monthly for b12 injections. Ok to create standing order for a year for monthly b12 injections?

## 2018-03-09 ENCOUNTER — OFFICE VISIT (OUTPATIENT)
Dept: SURGERY | Facility: CLINIC | Age: 53
End: 2018-03-09

## 2018-03-09 VITALS
DIASTOLIC BLOOD PRESSURE: 87 MMHG | HEART RATE: 88 BPM | OXYGEN SATURATION: 99 % | WEIGHT: 224.06 LBS | BODY MASS INDEX: 33.19 KG/M2 | RESPIRATION RATE: 16 BRPM | HEIGHT: 69 IN | SYSTOLIC BLOOD PRESSURE: 135 MMHG

## 2018-03-09 DIAGNOSIS — Z86.711 HISTORY OF PULMONARY EMBOLISM: ICD-10-CM

## 2018-03-09 DIAGNOSIS — M25.473 ANKLE SWELLING, UNSPECIFIED LATERALITY: ICD-10-CM

## 2018-03-09 DIAGNOSIS — E66.9 OBESITY (BMI 30-39.9): ICD-10-CM

## 2018-03-09 DIAGNOSIS — Z51.81 ENCOUNTER FOR THERAPEUTIC DRUG MONITORING: ICD-10-CM

## 2018-03-09 DIAGNOSIS — J45.990 EXERCISE-INDUCED ASTHMA: Primary | ICD-10-CM

## 2018-03-09 PROCEDURE — 99204 OFFICE O/P NEW MOD 45 MIN: CPT | Performed by: NURSE PRACTITIONER

## 2018-03-09 RX ORDER — TOPIRAMATE 25 MG/1
25 TABLET ORAL 2 TIMES DAILY
Qty: 30 TABLET | Refills: 1 | Status: SHIPPED | OUTPATIENT
Start: 2018-03-09 | End: 2018-03-23 | Stop reason: DRUGHIGH

## 2018-03-09 NOTE — PROGRESS NOTES
[facility] Consultation Note       Date of Consult:  3/9/2018    Patient:  Marlys Arriola  :      1965  MRN:      [id number]    Referring Provider: Dr. Miller ref.  provider found    Chief Complaint:  Patient presents with:  Cons Suspension 2 sprays by Each Nare route daily. Disp: 1 Bottle Rfl: 2   ibuprofen 800 MG Oral Tab Take 1 tablet (800 mg total) by mouth every 8 (eight) hours as needed.  Disp: 30 tablet Rfl: 5   Cyproheptadine HCl 2 MG/5ML Oral Syrup Take by mouth every 8 (ei Mother    • Cancer Sister      cervical   • Hypertension Sister    • Heart Disorder Sister    • Hypertension Brother    • Cancer Sister      cervical   • Hypertension Sister            Typical Dietary Intake:  Breakfast AM Snack Lunch PM Snack Dinner   Dani Gallardo Skin: Negative. Neurological: Negative. Psychiatric/Behavioral: Negative. Physical Exam:   Physical Exam   Constitutional: She is oriented to person, place, and time. She appears well-developed and well-nourished. No distress.    HENT:   Head exercise-  (goal is 150 minutes exercise per week)     Advised Patient not to skip meals- suggested Premier Protein shakes as a meal replacement since Usha Landry currently skips lunch    Eat 3 balanced meals per day    Provided Patient a list of healthier sna

## 2018-03-23 ENCOUNTER — TELEPHONE (OUTPATIENT)
Dept: SURGERY | Facility: CLINIC | Age: 53
End: 2018-03-23

## 2018-03-23 NOTE — TELEPHONE ENCOUNTER
Patient stopped in inquiring about a new prescription for Topiramate with an increase in dosage. Patient is doing well and is taking two tablets twice daily as instructed.     Please send new prescription to pharmacy

## 2018-03-26 ENCOUNTER — TELEPHONE (OUTPATIENT)
Dept: SURGERY | Facility: CLINIC | Age: 53
End: 2018-03-26

## 2018-03-26 NOTE — TELEPHONE ENCOUNTER
Lin Salazar called today regarding topiramate Rx. There was miscommunication on my part regarding increasing topiramate dose.  Dose was increased to 50mg daily (25mg BID), however, she was taking 50mg BID which has been working well for her sugar cravings and s

## 2018-03-26 NOTE — TELEPHONE ENCOUNTER
Please call Jocelyn Valle and let her know I sent over Rx to pharmacy with new dose instructions      Spoke with patient and read off [place] notes.  She understood and will contact [facility].

## 2018-04-09 ENCOUNTER — OFFICE VISIT (OUTPATIENT)
Dept: SURGERY | Facility: CLINIC | Age: 53
End: 2018-04-09

## 2018-04-09 VITALS
HEIGHT: 69 IN | WEIGHT: 216 LBS | BODY MASS INDEX: 31.99 KG/M2 | HEART RATE: 73 BPM | SYSTOLIC BLOOD PRESSURE: 127 MMHG | DIASTOLIC BLOOD PRESSURE: 79 MMHG | OXYGEN SATURATION: 100 % | RESPIRATION RATE: 16 BRPM

## 2018-04-09 DIAGNOSIS — Z86.711 HISTORY OF PULMONARY EMBOLISM: ICD-10-CM

## 2018-04-09 DIAGNOSIS — J45.990 EXERCISE-INDUCED ASTHMA: ICD-10-CM

## 2018-04-09 DIAGNOSIS — E66.9 OBESITY (BMI 30-39.9): ICD-10-CM

## 2018-04-09 DIAGNOSIS — Z51.81 ENCOUNTER FOR THERAPEUTIC DRUG MONITORING: Primary | ICD-10-CM

## 2018-04-09 PROCEDURE — 99213 OFFICE O/P EST LOW 20 MIN: CPT | Performed by: NURSE PRACTITIONER

## 2018-04-09 NOTE — PROGRESS NOTES
[address] [place] [address] Luisa Dong  [facility] [address] [facility]  Dept: [phone]     Date:   2018    Patient:  Dian Kyle  :      1965  MRN:      [id number]    Chief Complaint: 40 MG Oral Tab Take 40 mg by mouth as needed. Disp:  Rfl:    Albuterol Sulfate HFA (PROAIR HFA) 108 (90 Base) MCG/ACT Inhalation Aero Soln Inhale 2 puffs into the lungs every 4 (four) hours as needed for Wheezing.  Disp: 1 Inhaler Rfl: 0   spironolactone per day: 1-2 Type of snacks:  dark chocolate, raisins  · Amount of soda consumption per day:  none  · Amount of water (in ounces) per day:  adequate  · Drinking between meals only:  no  · Toughest challenge:  sweets    Nutritional Goals  Limit carbohydrate oriented to person, place, and time. Skin: Skin is warm and dry. Psychiatric: She has a normal mood and affect. Her behavior is normal. Judgment and thought content normal.   Vitals reviewed.     ASSESSMENT     Encounter Diagnosis(ses):   Encounter for

## 2018-04-13 ENCOUNTER — NURSE ONLY (OUTPATIENT)
Dept: INTERNAL MEDICINE CLINIC | Facility: CLINIC | Age: 53
End: 2018-04-13

## 2018-04-13 ENCOUNTER — OFFICE VISIT (OUTPATIENT)
Dept: PODIATRY CLINIC | Facility: CLINIC | Age: 53
End: 2018-04-13

## 2018-04-13 DIAGNOSIS — L60.0 INGROWN TOENAIL OF LEFT FOOT WITH INFECTION: Primary | ICD-10-CM

## 2018-04-13 DIAGNOSIS — E53.8 B12 DEFICIENCY: Primary | ICD-10-CM

## 2018-04-13 PROCEDURE — 99242 OFF/OP CONSLTJ NEW/EST SF 20: CPT | Performed by: PODIATRIST

## 2018-04-13 PROCEDURE — 96372 THER/PROPH/DIAG INJ SC/IM: CPT | Performed by: INTERNAL MEDICINE

## 2018-04-13 PROCEDURE — 99212 OFFICE O/P EST SF 10 MIN: CPT | Performed by: PODIATRIST

## 2018-04-13 RX ORDER — CYANOCOBALAMIN 1000 UG/ML
1000 INJECTION INTRAMUSCULAR; SUBCUTANEOUS ONCE
Status: COMPLETED | OUTPATIENT
Start: 2018-04-13 | End: 2018-04-13

## 2018-04-13 RX ORDER — CLINDAMYCIN HYDROCHLORIDE 150 MG/1
150 CAPSULE ORAL 3 TIMES DAILY
Qty: 21 CAPSULE | Refills: 0 | Status: SHIPPED | OUTPATIENT
Start: 2018-04-13 | End: 2018-09-18

## 2018-04-13 RX ADMIN — CYANOCOBALAMIN 1000 MCG: 1000 INJECTION INTRAMUSCULAR; SUBCUTANEOUS at 13:13:00

## 2018-04-13 NOTE — PROGRESS NOTES
HPI:    Patient ID: Yandy Gomez is a 50year old female. HPI  This 45-year-old female presents to me as a new patient on consult from [address] [place] [address]. Patient presents with a painful left great toenail.   History indicates the patient made an attempt daily. Disp:  Rfl:    TraMADol HCl 50 MG Oral Tab Take 50 mg by mouth every 8 (eight) hours as needed.    Disp:  Rfl:      Allergies:  Amoxicillin               Bactrim [Sulfametho*    Hives  Flagyl [Metronidazo*    Hives  Flu Virus Vaccine           Commen

## 2018-04-13 NOTE — PROGRESS NOTES
Pt. Taty Mckinley in for her monthly B12 inj. Name, , order, allergies verified. Pt. Tolerated well in rt.  Deltoid

## 2018-05-25 ENCOUNTER — TELEPHONE (OUTPATIENT)
Dept: SURGERY | Facility: CLINIC | Age: 53
End: 2018-05-25

## 2018-05-25 NOTE — TELEPHONE ENCOUNTER
Per Sung Livonia called [facility] ok'd topirimate #90 tablets because insurance will only pay for 90 tablets.

## 2018-06-02 ENCOUNTER — APPOINTMENT (OUTPATIENT)
Dept: CT IMAGING | Facility: HOSPITAL | Age: 53
End: 2018-06-02
Attending: EMERGENCY MEDICINE
Payer: COMMERCIAL

## 2018-06-02 ENCOUNTER — HOSPITAL ENCOUNTER (EMERGENCY)
Facility: HOSPITAL | Age: 53
Discharge: HOME | End: 2018-06-02
Attending: EMERGENCY MEDICINE
Payer: COMMERCIAL

## 2018-06-02 VITALS
SYSTOLIC BLOOD PRESSURE: 133 MMHG | BODY MASS INDEX: 32 KG/M2 | WEIGHT: 216.06 LBS | RESPIRATION RATE: 18 BRPM | HEIGHT: 69 IN | TEMPERATURE: 99 F | HEART RATE: 89 BPM | DIASTOLIC BLOOD PRESSURE: 82 MMHG | OXYGEN SATURATION: 99 %

## 2018-06-02 DIAGNOSIS — S00.83XA CONTUSION OF FACE, INITIAL ENCOUNTER: Primary | ICD-10-CM

## 2018-06-02 DIAGNOSIS — S70.00XA CONTUSION OF HIP, UNSPECIFIED LATERALITY, INITIAL ENCOUNTER: ICD-10-CM

## 2018-06-02 PROCEDURE — 70486 CT MAXILLOFACIAL W/O DYE: CPT | Performed by: EMERGENCY MEDICINE

## 2018-06-02 PROCEDURE — 99284 EMERGENCY DEPT VISIT MOD MDM: CPT

## 2018-06-02 RX ORDER — CHLORHEXIDINE GLUCONATE 0.12 MG/ML
15 RINSE ORAL 2 TIMES DAILY
Qty: 473 ML | Refills: 0 | Status: SHIPPED | OUTPATIENT
Start: 2018-06-02 | End: 2019-02-01 | Stop reason: ALTCHOICE

## 2018-06-02 RX ORDER — IBUPROFEN 600 MG/1
600 TABLET ORAL ONCE
Status: DISCONTINUED | OUTPATIENT
Start: 2018-06-02 | End: 2018-06-02

## 2018-06-02 RX ORDER — TRAMADOL HYDROCHLORIDE 50 MG/1
50 TABLET ORAL EVERY 6 HOURS PRN
Qty: 10 TABLET | Refills: 0 | Status: SHIPPED | OUTPATIENT
Start: 2018-06-02 | End: 2018-06-12
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
## 2018-06-04 ENCOUNTER — OFFICE VISIT (OUTPATIENT)
Dept: SURGERY | Facility: CLINIC | Age: 53
End: 2018-06-04

## 2018-06-04 VITALS
WEIGHT: 209 LBS | RESPIRATION RATE: 16 BRPM | HEIGHT: 69 IN | SYSTOLIC BLOOD PRESSURE: 121 MMHG | BODY MASS INDEX: 30.96 KG/M2 | DIASTOLIC BLOOD PRESSURE: 84 MMHG | HEART RATE: 78 BPM | OXYGEN SATURATION: 97 %

## 2018-06-04 DIAGNOSIS — Z52.4 KIDNEY DONOR: ICD-10-CM

## 2018-06-04 DIAGNOSIS — Z86.711 HISTORY OF PULMONARY EMBOLISM: ICD-10-CM

## 2018-06-04 DIAGNOSIS — M25.473 ANKLE SWELLING, UNSPECIFIED LATERALITY: ICD-10-CM

## 2018-06-04 DIAGNOSIS — Z51.81 ENCOUNTER FOR THERAPEUTIC DRUG MONITORING: Primary | ICD-10-CM

## 2018-06-04 DIAGNOSIS — J45.990 EXERCISE-INDUCED ASTHMA: ICD-10-CM

## 2018-06-04 DIAGNOSIS — E66.9 OBESITY (BMI 30-39.9): ICD-10-CM

## 2018-06-04 PROCEDURE — 99213 OFFICE O/P EST LOW 20 MIN: CPT | Performed by: NURSE PRACTITIONER

## 2018-06-04 NOTE — PROGRESS NOTES
[address], [address]  Dept: [phone]     Date:   18    Patient:  Roberto Randolph  :      1965  MRN:      [id number]    Chief Complaint: (eight) hours as needed. Disp: 30 tablet Rfl: 5   Cyproheptadine HCl 2 MG/5ML Oral Syrup Take by mouth every 8 (eight) hours. Disp:  Rfl:    Doxepin HCl (ZONALON) 5 % External Cream Apply 1 Application topically 3 (three) times daily.  For itchy spot Disp: nutrition labels? yes  · Average Caloric Intake:     · Average CHO Intake: <100  · Is patient exercising? yes  · Type of exercise?  Walking    Eating Habits  · Patient states the following:  · Eats 3 meal(s) per day  · Length of time it takes to consume a well-developed and well-nourished. No distress. HENT:   Head: Normocephalic and atraumatic. Neck: Neck supple. Pulmonary/Chest: Effort normal. No respiratory distress. Abdominal: Soft. Musculoskeletal: Normal range of motion.  She exhibits no sb

## 2018-06-04 NOTE — PATIENT INSTRUCTIONS
Goals for next month:  1. Keep a food log using [facility]  2. Drink 48-64 ounces of water per day. No fruit juices or regular soda. 3. Increase aerobic exercises (goal is 150 minutes per week)  4. Increase fruit and vegetable servings/day  5.  Keep carbs at or b

## 2018-06-28 ENCOUNTER — OFFICE VISIT (OUTPATIENT)
Dept: DERMATOLOGY CLINIC | Facility: CLINIC | Age: 53
End: 2018-06-28

## 2018-06-28 DIAGNOSIS — L56.8 PHOTODERMATITIS: Primary | ICD-10-CM

## 2018-06-28 DIAGNOSIS — L30.9 DERMATITIS: ICD-10-CM

## 2018-06-28 PROCEDURE — 99213 OFFICE O/P EST LOW 20 MIN: CPT | Performed by: DERMATOLOGY

## 2018-06-28 PROCEDURE — 99212 OFFICE O/P EST SF 10 MIN: CPT | Performed by: DERMATOLOGY

## 2018-06-28 RX ORDER — HYDROQUINONE 40 MG/G
1 CREAM TOPICAL 2 TIMES DAILY
Qty: 30 G | Refills: 1 | Status: SHIPPED | OUTPATIENT
Start: 2018-06-28 | End: 2018-07-28

## 2018-06-28 RX ORDER — PREDNISONE 10 MG/1
TABLET ORAL
Qty: 30 TABLET | OUTPATIENT
Start: 2018-06-28 | End: 2018-06-29

## 2018-06-28 RX ORDER — PREDNISONE 10 MG/1
TABLET ORAL
Qty: 30 TABLET | Status: SHIPPED | OUTPATIENT
Start: 2018-06-28 | End: 2018-06-28

## 2018-06-28 RX ORDER — AMMONIUM LACTATE 12 G/100G
1 LOTION TOPICAL 2 TIMES DAILY
Qty: 500 G | Refills: 11 | Status: SHIPPED | OUTPATIENT
Start: 2018-06-28 | End: 2018-07-28

## 2018-06-28 RX ORDER — CLOBETASOL PROPIONATE 0.5 MG/G
1 CREAM TOPICAL 2 TIMES DAILY
Qty: 45 G | Refills: 1 | Status: SHIPPED | OUTPATIENT
Start: 2018-06-28 | End: 2019-06-28

## 2018-06-29 RX ORDER — PREDNISONE 10 MG/1
TABLET ORAL
Qty: 30 TABLET | Status: SHIPPED | OUTPATIENT
Start: 2018-06-29 | End: 2018-06-29

## 2018-06-29 RX ORDER — PREDNISONE 10 MG/1
TABLET ORAL
Qty: 30 TABLET | Refills: 6 | Status: SHIPPED | OUTPATIENT
Start: 2018-06-29 | End: 2018-06-29

## 2018-06-29 RX ORDER — PREDNISONE 10 MG/1
TABLET ORAL
Qty: 30 TABLET | Refills: 6 | Status: SHIPPED | OUTPATIENT
Start: 2018-06-29 | End: 2018-09-21 | Stop reason: ALTCHOICE

## 2018-07-09 NOTE — PROGRESS NOTES
Roberto Randolph is a 50year old female.     Patient presents with:  Rash: LOV 10/06/17 pt was seen for rash pt here again for rash that she says is because of the sun/heat shes tried benadryl and claritan with no relief c/o severe itching -[facility] Clindamycin HCl 150 MG Oral Cap Take 1 capsule (150 mg total) by mouth 3 (three) times daily. Disp: 21 capsule Rfl: 0   Fluticasone Propionate 50 MCG/ACT Nasal Suspension 2 sprays by Each Nare route daily.  Disp: 1 Bottle Rfl: 2   Doxepin HCl (ZONALON) 5 Inhale 2 puffs into the lungs every 4 (four) hours as needed for Wheezing. Disp: 1 Inhaler Rfl: 0   spironolactone 50 MG Oral Tab Take 1 tablet by mouth daily. Disp:  Rfl:    TraMADol HCl 50 MG Oral Tab Take 50 mg by mouth every 8 (eight) hours as needed. Diabetes Mother    • Hypertension Mother    • Cancer Sister      cervical   • Hypertension Sister    • Heart Disorder Sister    • Hypertension Brother    • Cancer Sister      cervical   • Hypertension Sister                       HPI :      Patient present areas betazole 2-3 times daily consider prednisone taper keep prednisone on hand for flareups. AmLactin to more scaly areas and this chromic areas caution as this may cause more sun sensitivity meds in grid. Skin care instructions reviewed.   The use vari

## 2018-07-27 ENCOUNTER — OFFICE VISIT (OUTPATIENT)
Dept: SURGERY | Facility: CLINIC | Age: 53
End: 2018-07-27
Payer: COMMERCIAL

## 2018-07-27 VITALS
HEIGHT: 69 IN | HEART RATE: 73 BPM | DIASTOLIC BLOOD PRESSURE: 82 MMHG | RESPIRATION RATE: 16 BRPM | BODY MASS INDEX: 30.29 KG/M2 | WEIGHT: 204.5 LBS | OXYGEN SATURATION: 100 % | SYSTOLIC BLOOD PRESSURE: 126 MMHG

## 2018-07-27 DIAGNOSIS — E66.9 OBESITY (BMI 30-39.9): Primary | ICD-10-CM

## 2018-07-27 DIAGNOSIS — Z86.711 HISTORY OF PULMONARY EMBOLISM: ICD-10-CM

## 2018-07-27 DIAGNOSIS — M25.473 ANKLE SWELLING, UNSPECIFIED LATERALITY: ICD-10-CM

## 2018-07-27 DIAGNOSIS — J45.990 EXERCISE-INDUCED ASTHMA: ICD-10-CM

## 2018-07-27 DIAGNOSIS — Z52.4 KIDNEY DONOR: ICD-10-CM

## 2018-07-27 PROCEDURE — 99213 OFFICE O/P EST LOW 20 MIN: CPT | Performed by: NURSE PRACTITIONER

## 2018-07-27 NOTE — PATIENT INSTRUCTIONS
Goals for next month:  1. Keep a food log using [facility]  2. Drink 48-64 ounces of water per day. No fruit juices or regular soda. 3. Increase aerobic exercises (goal is 150 minutes per week)  4. Increase fruit and vegetable servings/day  5.  Keep carbs at or b

## 2018-07-27 NOTE — PROGRESS NOTES
[address] [place] [address] Luisa Ninoascencion  [facility] [address] [facility],[address]  Dept: [phone]     Date:   18    Patient:  Becky Pichardo  :      1965  MRN:      [id number]    Chief Complaint: MG Oral Tab Take 1 tablet (50 mg total) by mouth 2 (two) times daily. Disp: 60 tablet Rfl: 1   Ondansetron HCl (ZOFRAN) 4 mg tablet Take 1 tablet (4 mg total) by mouth every 8 (eight) hours as needed for Nausea.  Disp: 20 tablet Rfl: 2   Fluticasone Propion History   Problem Relation Age of Onset   • Cancer Father      prostate   • Hypertension Father    • Diabetes Mother    • Hypertension Mother    • Cancer Sister      cervical   • Hypertension Sister    • Heart Disorder Sister    • Hypertension Brother    • diarrhea, nausea and vomiting. Genitourinary: Negative. Negative for dysuria, flank pain and frequency. Musculoskeletal: Negative. Negative for back pain and gait problem. Skin: Negative. Neurological: Negative.   Negative for dizziness and heada

## 2018-08-28 RX ORDER — TOPIRAMATE 50 MG/1
50 TABLET, FILM COATED ORAL 2 TIMES DAILY
Qty: 180 TABLET | Refills: 0 | Status: SHIPPED | OUTPATIENT
Start: 2018-08-28 | End: 2018-12-11

## 2018-09-07 ENCOUNTER — TELEPHONE (OUTPATIENT)
Dept: SURGERY | Facility: CLINIC | Age: 53
End: 2018-09-07

## 2018-09-18 ENCOUNTER — MED REC SCAN ONLY (OUTPATIENT)
Dept: INTERNAL MEDICINE CLINIC | Facility: CLINIC | Age: 53
End: 2018-09-18

## 2018-09-18 ENCOUNTER — OFFICE VISIT (OUTPATIENT)
Dept: INTERNAL MEDICINE CLINIC | Facility: CLINIC | Age: 53
End: 2018-09-18
Payer: COMMERCIAL

## 2018-09-18 ENCOUNTER — LAB ENCOUNTER (OUTPATIENT)
Dept: LAB | Facility: HOSPITAL | Age: 53
End: 2018-09-18
Attending: INTERNAL MEDICINE
Payer: COMMERCIAL

## 2018-09-18 VITALS
DIASTOLIC BLOOD PRESSURE: 74 MMHG | HEART RATE: 77 BPM | TEMPERATURE: 99 F | SYSTOLIC BLOOD PRESSURE: 111 MMHG | BODY MASS INDEX: 30 KG/M2 | HEIGHT: 69 IN

## 2018-09-18 DIAGNOSIS — D64.9 ANEMIA, UNSPECIFIED TYPE: ICD-10-CM

## 2018-09-18 DIAGNOSIS — E53.8 VITAMIN B12 DEFICIENCY: ICD-10-CM

## 2018-09-18 DIAGNOSIS — J06.9 UPPER RESPIRATORY TRACT INFECTION, UNSPECIFIED TYPE: Primary | ICD-10-CM

## 2018-09-18 DIAGNOSIS — Z12.31 SCREENING MAMMOGRAM, ENCOUNTER FOR: ICD-10-CM

## 2018-09-18 DIAGNOSIS — J45.21 MILD INTERMITTENT ASTHMA WITH EXACERBATION: ICD-10-CM

## 2018-09-18 LAB
BASOPHILS # BLD: 0.1 K/UL (ref 0–0.2)
BASOPHILS NFR BLD: 1 %
EOSINOPHIL # BLD: 0.3 K/UL (ref 0–0.7)
EOSINOPHIL NFR BLD: 4 %
ERYTHROCYTE [DISTWIDTH] IN BLOOD BY AUTOMATED COUNT: 16.9 % (ref 11–15)
FERRITIN SERPL IA-MCNC: 30 NG/ML (ref 11–307)
HCT VFR BLD AUTO: 40.6 % (ref 35–48)
HGB BLD-MCNC: 12.9 G/DL (ref 12–16)
IRON SATN MFR SERPL: 8 % (ref 15–50)
IRON SERPL-MCNC: 22 MCG/DL (ref 28–170)
LYMPHOCYTES # BLD: 2.3 K/UL (ref 1–4)
LYMPHOCYTES NFR BLD: 29 %
MCH RBC QN AUTO: 26.1 PG (ref 27–32)
MCHC RBC AUTO-ENTMCNC: 31.7 G/DL (ref 32–37)
MCV RBC AUTO: 82.3 FL (ref 80–100)
MONOCYTES # BLD: 0.6 K/UL (ref 0–1)
MONOCYTES NFR BLD: 7 %
NEUTROPHILS # BLD AUTO: 4.7 K/UL (ref 1.8–7.7)
NEUTROPHILS NFR BLD: 59 %
PLATELET # BLD AUTO: 220 K/UL (ref 140–400)
PMV BLD AUTO: 9.3 FL (ref 7.4–10.3)
RBC # BLD AUTO: 4.93 M/UL (ref 3.7–5.4)
TIBC SERPL-MCNC: 292 MCG/DL (ref 228–428)
TRANSFERRIN SERPL-MCNC: 221 MG/DL (ref 192–382)
WBC # BLD AUTO: 8 K/UL (ref 4–11)

## 2018-09-18 PROCEDURE — 85025 COMPLETE CBC W/AUTO DIFF WBC: CPT

## 2018-09-18 PROCEDURE — 96372 THER/PROPH/DIAG INJ SC/IM: CPT | Performed by: INTERNAL MEDICINE

## 2018-09-18 PROCEDURE — 84466 ASSAY OF TRANSFERRIN: CPT

## 2018-09-18 PROCEDURE — 83540 ASSAY OF IRON: CPT

## 2018-09-18 PROCEDURE — 36415 COLL VENOUS BLD VENIPUNCTURE: CPT

## 2018-09-18 PROCEDURE — 99212 OFFICE O/P EST SF 10 MIN: CPT | Performed by: INTERNAL MEDICINE

## 2018-09-18 PROCEDURE — 82728 ASSAY OF FERRITIN: CPT

## 2018-09-18 PROCEDURE — 99214 OFFICE O/P EST MOD 30 MIN: CPT | Performed by: INTERNAL MEDICINE

## 2018-09-18 RX ORDER — AZITHROMYCIN 250 MG/1
TABLET, FILM COATED ORAL
Qty: 6 TABLET | Refills: 0 | Status: SHIPPED | OUTPATIENT
Start: 2018-09-18 | End: 2018-09-21 | Stop reason: ALTCHOICE

## 2018-09-18 RX ORDER — ALBUTEROL SULFATE 90 UG/1
2 AEROSOL, METERED RESPIRATORY (INHALATION) EVERY 4 HOURS PRN
Qty: 1 INHALER | Refills: 2 | Status: SHIPPED | OUTPATIENT
Start: 2018-09-18 | End: 2021-07-16

## 2018-09-18 RX ORDER — CYANOCOBALAMIN 1000 UG/ML
1000 INJECTION INTRAMUSCULAR; SUBCUTANEOUS
Status: SHIPPED | OUTPATIENT
Start: 2018-09-18 | End: 2019-03-17

## 2018-09-18 RX ADMIN — CYANOCOBALAMIN 1000 MCG: 1000 INJECTION INTRAMUSCULAR; SUBCUTANEOUS at 16:56:00

## 2018-09-18 NOTE — ASSESSMENT & PLAN NOTE
Pt cannot remember to take the B12 daily. Will give a monthly injection. Recheck level in March before the injection.

## 2018-09-18 NOTE — PROGRESS NOTES
HPI:    Patient ID: Zora Alexandra is a 48year old female. Pt has been sick for 4-5 days. Pt had mild iron deficiency anemia. She had      URI    This is a new problem. The current episode started in the past 7 days.  The problem has been Zehra the mouth or throat 2 (two) times daily. Swish and then spit Disp: 473 mL Rfl: 0   topiramate 50 MG Oral Tab Take 1 tablet (50 mg total) by mouth 2 (two) times daily.  Disp: 60 tablet Rfl: 1   Ondansetron HCl (ZOFRAN) 4 mg tablet Take 1 tablet (4 mg total) She appears well-developed and well-nourished. No distress. HENT:   Head: Normocephalic and atraumatic.    Right Ear: Tympanic membrane normal.   Left Ear: Tympanic membrane normal.   Nose: Nose normal.   Mouth/Throat: Oropharynx is clear and moist. No po infection but she does now. Relevant Medications    Albuterol Sulfate HFA (PROAIR HFA) 108 (90 Base) MCG/ACT Inhalation Aero Soln    Vitamin B12 deficiency     Pt cannot remember to take the B12 daily. Will give a monthly injection.   Recheck level

## 2018-09-19 NOTE — ASSESSMENT & PLAN NOTE
The patient mostly has exercise-induced asthma and it probably is worse when she has an upper respiratory infection but she does now.

## 2018-09-19 NOTE — ASSESSMENT & PLAN NOTE
The patient has had upper respiratory infection symptoms for 4 days and her asthma has been slightly aggravated by this.   Since she had such a severe reaction to the combination of amoxicillin and clarithromycin, I am reluctant to give her any antibiotics

## 2018-09-19 NOTE — ASSESSMENT & PLAN NOTE
The patient has had a mild iron deficiency anemia since February 2017. She used to have heavy periods but she no longer has those. She may have had a period in February. Her last colonoscopy was in 2011.   We will recheck her CBC and if it is improving w

## 2018-09-21 ENCOUNTER — OFFICE VISIT (OUTPATIENT)
Dept: SURGERY | Facility: CLINIC | Age: 53
End: 2018-09-21
Payer: COMMERCIAL

## 2018-09-21 VITALS
BODY MASS INDEX: 29.39 KG/M2 | SYSTOLIC BLOOD PRESSURE: 128 MMHG | WEIGHT: 198.44 LBS | HEART RATE: 82 BPM | OXYGEN SATURATION: 99 % | RESPIRATION RATE: 16 BRPM | HEIGHT: 69 IN | DIASTOLIC BLOOD PRESSURE: 87 MMHG

## 2018-09-21 DIAGNOSIS — J45.990 EXERCISE-INDUCED ASTHMA: ICD-10-CM

## 2018-09-21 DIAGNOSIS — Z51.81 ENCOUNTER FOR THERAPEUTIC DRUG MONITORING: Primary | ICD-10-CM

## 2018-09-21 DIAGNOSIS — E66.9 OBESITY (BMI 30-39.9): ICD-10-CM

## 2018-09-21 DIAGNOSIS — Z86.711 HISTORY OF PULMONARY EMBOLISM: ICD-10-CM

## 2018-09-21 PROCEDURE — 99214 OFFICE O/P EST MOD 30 MIN: CPT | Performed by: NURSE PRACTITIONER

## 2018-09-21 NOTE — PATIENT INSTRUCTIONS
Goals for next month:  1. Keep a food log using [facility]  2. Drink 48-64 ounces of water per day. No fruit juices or regular soda. 3. Increase aerobic exercises (goal is 150 minutes per week)  4. Increase fruit and vegetable servings/day  5.  Keep carbs at or b

## 2018-09-21 NOTE — PROGRESS NOTES
[address], [place] [address] Luisa Dong  [facility] [address] [facility],4[address]  Dept: [phone]     Date:  18    Patient:  Johan Moctezuma  :      1965  MRN:      [id number]    Chief Complaint: spit Disp: 473 mL Rfl: 0   topiramate 50 MG Oral Tab Take 1 tablet (50 mg total) by mouth 2 (two) times daily. Disp: 60 tablet Rfl: 1   Ondansetron HCl (ZOFRAN) 4 mg tablet Take 1 tablet (4 mg total) by mouth every 8 (eight) hours as needed for Nausea.  Dis Outdoor occupation: Not Asked        Pt has a pacemaker: No        Pt has a defibrillator: No        Breast feeding: Not Asked        Reaction to local anesthetic: No    Social History Narrative      Not on file    Surgical History:  Past Surgical History: eating and manage cues and Eat slowly and take 20 to 30 minutes to complete each meal    Exercise Goals Reviewed and Discussed    Walk for  45 Minutes    ROS:    Review of Systems   Constitutional: Negative.   Negative for activity change, appetite change a and decreasing her sweet tooth. Recently refilled continue Topiramate 50mg BID    Goals for next month:  1. Keep a food log using [facility]  2. Drink 48-64 ounces of water per day. No fruit juices or regular soda.   3. Increase aerobic exercises (goal is 150 amanda

## 2018-09-24 ENCOUNTER — PATIENT MESSAGE (OUTPATIENT)
Dept: INTERNAL MEDICINE CLINIC | Facility: CLINIC | Age: 53
End: 2018-09-24

## 2018-09-24 DIAGNOSIS — R05.9 COUGH: Primary | ICD-10-CM

## 2018-09-24 NOTE — TELEPHONE ENCOUNTER
Please see patient's message below and advise    LOV 9/18/18    The patient expressed understanding and agreed with the plan.     Meds This Visit:  Requested Prescriptions             Signed Prescriptions Disp Refills   • azithromycin (Cher Walton) 250

## 2018-09-24 NOTE — TELEPHONE ENCOUNTER
From: Randall Perez  To: Jane Verduzco MD  Sent: 9/24/2018 6:52 AM CDT  Subject: Non-Urgent Medical Question    Good morning had a rough night last night. Still coughing and headache can I have a chest x ray.

## 2018-09-24 NOTE — TELEPHONE ENCOUNTER
Spoke with patient and relayed LV message--patient verbalizes understanding and agreement and will complete CXR. No further questions/concerns at this time.

## 2018-10-09 ENCOUNTER — TELEPHONE (OUTPATIENT)
Dept: INTERNAL MEDICINE CLINIC | Facility: CLINIC | Age: 53
End: 2018-10-09

## 2018-10-09 DIAGNOSIS — Z00.00 ROUTINE HEALTH MAINTENANCE: Primary | ICD-10-CM

## 2018-11-01 ENCOUNTER — NURSE ONLY (OUTPATIENT)
Dept: INTERNAL MEDICINE CLINIC | Facility: CLINIC | Age: 53
End: 2018-11-01
Payer: COMMERCIAL

## 2018-11-01 DIAGNOSIS — E53.8 VITAMIN B12 DEFICIENCY: ICD-10-CM

## 2018-11-01 PROCEDURE — 96372 THER/PROPH/DIAG INJ SC/IM: CPT | Performed by: INTERNAL MEDICINE

## 2018-11-01 RX ADMIN — CYANOCOBALAMIN 1000 MCG: 1000 INJECTION INTRAMUSCULAR; SUBCUTANEOUS at 15:44:00

## 2018-11-01 NOTE — PROGRESS NOTES
Patient came in today for their Vitamin B-12 injection. Patient verbalized  and name. Order verified in the system. Patient responded well to injection, no reaction noted. VIS was given.

## 2018-11-02 VITALS
HEART RATE: 96 BPM | TEMPERATURE: 97.8 F | SYSTOLIC BLOOD PRESSURE: 110 MMHG | OXYGEN SATURATION: 99 % | RESPIRATION RATE: 18 BRPM | DIASTOLIC BLOOD PRESSURE: 80 MMHG | HEIGHT: 70 IN

## 2018-11-03 VITALS
HEIGHT: 70 IN | SYSTOLIC BLOOD PRESSURE: 113 MMHG | WEIGHT: 189 LBS | BODY MASS INDEX: 27.06 KG/M2 | HEART RATE: 87 BPM | DIASTOLIC BLOOD PRESSURE: 78 MMHG

## 2018-11-20 ENCOUNTER — APPOINTMENT (OUTPATIENT)
Dept: CT IMAGING | Facility: HOSPITAL | Age: 53
End: 2018-11-20
Attending: EMERGENCY MEDICINE
Payer: COMMERCIAL

## 2018-11-20 ENCOUNTER — APPOINTMENT (OUTPATIENT)
Dept: GENERAL RADIOLOGY | Facility: HOSPITAL | Age: 53
End: 2018-11-20
Attending: EMERGENCY MEDICINE
Payer: COMMERCIAL

## 2018-11-20 ENCOUNTER — HOSPITAL ENCOUNTER (EMERGENCY)
Facility: HOSPITAL | Age: 53
Discharge: HOME | End: 2018-11-20
Attending: EMERGENCY MEDICINE
Payer: COMMERCIAL

## 2018-11-20 VITALS
RESPIRATION RATE: 16 BRPM | SYSTOLIC BLOOD PRESSURE: 109 MMHG | HEART RATE: 56 BPM | OXYGEN SATURATION: 99 % | BODY MASS INDEX: 29.18 KG/M2 | WEIGHT: 197 LBS | TEMPERATURE: 98 F | DIASTOLIC BLOOD PRESSURE: 69 MMHG | HEIGHT: 69 IN

## 2018-11-20 DIAGNOSIS — G44.209 TENSION HEADACHE: Primary | ICD-10-CM

## 2018-11-20 PROCEDURE — 96375 TX/PRO/DX INJ NEW DRUG ADDON: CPT

## 2018-11-20 PROCEDURE — 96361 HYDRATE IV INFUSION ADD-ON: CPT

## 2018-11-20 PROCEDURE — 70450 CT HEAD/BRAIN W/O DYE: CPT | Performed by: EMERGENCY MEDICINE

## 2018-11-20 PROCEDURE — 96374 THER/PROPH/DIAG INJ IV PUSH: CPT

## 2018-11-20 PROCEDURE — 81003 URINALYSIS AUTO W/O SCOPE: CPT | Performed by: EMERGENCY MEDICINE

## 2018-11-20 PROCEDURE — 71046 X-RAY EXAM CHEST 2 VIEWS: CPT | Performed by: EMERGENCY MEDICINE

## 2018-11-20 PROCEDURE — 85025 COMPLETE CBC W/AUTO DIFF WBC: CPT | Performed by: EMERGENCY MEDICINE

## 2018-11-20 PROCEDURE — 80048 BASIC METABOLIC PNL TOTAL CA: CPT | Performed by: EMERGENCY MEDICINE

## 2018-11-20 PROCEDURE — 99285 EMERGENCY DEPT VISIT HI MDM: CPT

## 2018-11-20 RX ORDER — ONDANSETRON 4 MG/1
4 TABLET, ORALLY DISINTEGRATING ORAL EVERY 8 HOURS PRN
Qty: 10 TABLET | Refills: 0 | Status: SHIPPED | OUTPATIENT
Start: 2018-11-20 | End: 2018-11-27

## 2018-11-20 RX ORDER — KETOROLAC TROMETHAMINE 30 MG/ML
30 INJECTION, SOLUTION INTRAMUSCULAR; INTRAVENOUS ONCE
Status: COMPLETED | OUTPATIENT
Start: 2018-11-20 | End: 2018-11-20

## 2018-11-20 RX ORDER — DIPHENHYDRAMINE HYDROCHLORIDE 50 MG/ML
25 INJECTION INTRAMUSCULAR; INTRAVENOUS ONCE
Status: COMPLETED | OUTPATIENT
Start: 2018-11-20 | End: 2018-11-20

## 2018-11-20 RX ORDER — METOCLOPRAMIDE HYDROCHLORIDE 5 MG/ML
10 INJECTION INTRAMUSCULAR; INTRAVENOUS ONCE
Status: COMPLETED | OUTPATIENT
Start: 2018-11-20 | End: 2018-11-20

## 2018-11-25 NOTE — ED PROVIDER NOTES
Patient Seen in: [facility] AND [facility] Emergency Department    History   Patient presents with:  Malaise    Stated Complaint: malaise, headache    HPI    48year old female with h/o asthma and HTN who presents with severe headache and nausea x 2 days.  Pt sta and moist and mucous membranes are normal.   No ttp at temples   Eyes: Conjunctivae and EOM are normal. Pupils are equal, round, and reactive to light. Neck: Normal range of motion. Neck supple.    No meningismus   Cardiovascular: Normal rate, regular rhy view results for these tests on the individual orders.    RAINBOW DRAW DARK GREEN       MDM     Pulse Ox: 99%, Normal, RA    Cardiac Monitor: Pulse Readings from Last 1 Encounters:  11/20/18 : 56  , sinus, normal for rate and rhythm     Radiology findings: headache  (primary encounter diagnosis)    Disposition:  Discharge  11/20/2018  3:38 pm    Follow-up:  Veronica Barr MD  [address] [facility]  [phone]    Schedule an appointment as soon as possible for a visit  Call for next availab

## 2018-12-11 ENCOUNTER — TELEPHONE (OUTPATIENT)
Dept: SURGERY | Facility: CLINIC | Age: 53
End: 2018-12-11

## 2018-12-11 ENCOUNTER — OFFICE VISIT (OUTPATIENT)
Dept: SURGERY | Facility: CLINIC | Age: 53
End: 2018-12-11
Payer: COMMERCIAL

## 2018-12-11 VITALS
HEIGHT: 69 IN | WEIGHT: 200 LBS | RESPIRATION RATE: 18 BRPM | OXYGEN SATURATION: 96 % | SYSTOLIC BLOOD PRESSURE: 120 MMHG | BODY MASS INDEX: 29.62 KG/M2 | HEART RATE: 77 BPM | DIASTOLIC BLOOD PRESSURE: 80 MMHG

## 2018-12-11 DIAGNOSIS — I10 ESSENTIAL HYPERTENSION: Primary | ICD-10-CM

## 2018-12-11 DIAGNOSIS — E53.8 VITAMIN B12 DEFICIENCY: ICD-10-CM

## 2018-12-11 DIAGNOSIS — E66.9 OBESITY (BMI 30-39.9): ICD-10-CM

## 2018-12-11 DIAGNOSIS — Z51.81 ENCOUNTER FOR THERAPEUTIC DRUG MONITORING: ICD-10-CM

## 2018-12-11 PROCEDURE — 99214 OFFICE O/P EST MOD 30 MIN: CPT | Performed by: INTERNAL MEDICINE

## 2018-12-11 RX ORDER — PHENTERMINE HYDROCHLORIDE 15 MG/1
15 CAPSULE ORAL EVERY MORNING
Qty: 30 CAPSULE | Refills: 2 | Status: SHIPPED | OUTPATIENT
Start: 2018-12-11 | End: 2019-02-12 | Stop reason: DRUGHIGH

## 2018-12-11 RX ORDER — TOPIRAMATE 50 MG/1
50 TABLET, FILM COATED ORAL 2 TIMES DAILY
Qty: 180 TABLET | Refills: 1 | Status: SHIPPED | OUTPATIENT
Start: 2018-12-11 | End: 2019-02-12

## 2018-12-11 NOTE — PROGRESS NOTES
[address] [place] [address] Luisa Dong  [facility] [address] [facility],4[address]  Dept: [phone]     Date:  18    Patient:  Monica Sims  :      1965  MRN:      [id number]    Chief Complaint: tablet (800 mg total) by mouth every 8 (eight) hours as needed. Disp: 30 tablet Rfl: 5   Cyproheptadine HCl 2 MG/5ML Oral Syrup Take by mouth every 8 (eight) hours.  Disp:  Rfl:    Doxepin HCl (ZONALON) 5 % External Cream Apply 1 Application topically 3 (th ENDOSCOPY   • NEEDLE BIOPSY RIGHT     • NEPHRECTOMY Right 1999   • NEPHRECTOMY     • OTHER  2015    UFE     Family History:    Family History   Problem Relation Age of Onset   • Cancer Father         prostate   • Hypertension Father    • Diabetes Mother for chest pain, palpitations and leg swelling. Gastrointestinal: Negative. Negative for abdominal distention, abdominal pain, constipation, diarrhea, nausea and vomiting. Genitourinary: Negative. Negative for dysuria, flank pain and frequency.    Musc advance    Tolerating topiramate will refill  Kidney donor    Will restart phentermine  Was on it in the past  Will restart at 15 mg  Needs ekg    Tsering Rizzo MD

## 2019-01-15 ENCOUNTER — HOSPITAL ENCOUNTER (OUTPATIENT)
Dept: MAMMOGRAPHY | Facility: HOSPITAL | Age: 54
Discharge: HOME | End: 2019-01-15
Attending: INTERNAL MEDICINE
Payer: COMMERCIAL

## 2019-01-15 DIAGNOSIS — Z00.00 ROUTINE HEALTH MAINTENANCE: ICD-10-CM

## 2019-01-15 PROCEDURE — 77067 SCR MAMMO BI INCL CAD: CPT | Performed by: INTERNAL MEDICINE

## 2019-01-15 PROCEDURE — 77063 BREAST TOMOSYNTHESIS BI: CPT | Performed by: INTERNAL MEDICINE

## 2019-01-25 ENCOUNTER — OFFICE VISIT (OUTPATIENT)
Dept: PODIATRY CLINIC | Facility: CLINIC | Age: 54
End: 2019-01-25
Payer: COMMERCIAL

## 2019-01-25 ENCOUNTER — TELEPHONE (OUTPATIENT)
Dept: PODIATRY CLINIC | Facility: CLINIC | Age: 54
End: 2019-01-25

## 2019-01-25 DIAGNOSIS — L60.0 INGROWN TOENAIL OF LEFT FOOT WITH INFECTION: Primary | ICD-10-CM

## 2019-01-25 PROCEDURE — 99212 OFFICE O/P EST SF 10 MIN: CPT | Performed by: PODIATRIST

## 2019-01-25 RX ORDER — CLINDAMYCIN HYDROCHLORIDE 150 MG/1
150 CAPSULE ORAL 3 TIMES DAILY
Qty: 21 CAPSULE | Refills: 0 | Status: SHIPPED | OUTPATIENT
Start: 2019-01-25 | End: 2019-03-05 | Stop reason: ALTCHOICE

## 2019-01-25 NOTE — PROGRESS NOTES
HPI:    Patient ID: Johana Cantu is a 48year old female. This 53-year-old female presents with recurrent pain associated with the toenail. Patient states that it never completely resolved after seeing her 8 9 months ago.       ROS:     I did review Bactrim [Sulfametho*    HIVES  Flagyl [Metronidazo*    HIVES  Flu Virus Vaccine           Comment:Respiratory Problems   PHYSICAL EXAM:     On physical exam all pain is along the medial border of the left great toe.   There is hypertrophy and erythema of

## 2019-01-29 ENCOUNTER — OFFICE VISIT (OUTPATIENT)
Dept: INTERNAL MEDICINE CLINIC | Facility: CLINIC | Age: 54
End: 2019-01-29
Payer: COMMERCIAL

## 2019-01-29 VITALS
BODY MASS INDEX: 29 KG/M2 | DIASTOLIC BLOOD PRESSURE: 77 MMHG | WEIGHT: 195.81 LBS | RESPIRATION RATE: 16 BRPM | HEIGHT: 69 IN | SYSTOLIC BLOOD PRESSURE: 125 MMHG | HEART RATE: 79 BPM | TEMPERATURE: 98 F

## 2019-01-29 DIAGNOSIS — I10 ESSENTIAL HYPERTENSION: ICD-10-CM

## 2019-01-29 DIAGNOSIS — R87.612 LOW GRADE SQUAMOUS INTRAEPITHELIAL LESION (LGSIL) ON CERVICAL PAP SMEAR: ICD-10-CM

## 2019-01-29 DIAGNOSIS — Z52.4 KIDNEY DONOR: ICD-10-CM

## 2019-01-29 DIAGNOSIS — Z00.00 ROUTINE HEALTH MAINTENANCE: Primary | ICD-10-CM

## 2019-01-29 DIAGNOSIS — J45.990 EXERCISE-INDUCED ASTHMA: ICD-10-CM

## 2019-01-29 DIAGNOSIS — E53.8 VITAMIN B12 DEFICIENCY: ICD-10-CM

## 2019-01-29 PROBLEM — J45.21 MILD INTERMITTENT ASTHMA WITH EXACERBATION: Status: RESOLVED | Noted: 2018-09-18 | Resolved: 2019-01-29

## 2019-01-29 PROBLEM — M25.473 ANKLE SWELLING: Status: RESOLVED | Noted: 2018-03-09 | Resolved: 2019-01-29

## 2019-01-29 PROBLEM — J06.9 UPPER RESPIRATORY TRACT INFECTION: Status: RESOLVED | Noted: 2018-09-18 | Resolved: 2019-01-29

## 2019-01-29 PROBLEM — J45.21 MILD INTERMITTENT ASTHMA WITH EXACERBATION (HCC): Status: RESOLVED | Noted: 2018-09-18 | Resolved: 2019-01-29

## 2019-01-29 PROCEDURE — 99396 PREV VISIT EST AGE 40-64: CPT | Performed by: INTERNAL MEDICINE

## 2019-01-29 NOTE — ASSESSMENT & PLAN NOTE
Unremarkable exam.  She is up-to-date on her colonoscopy  Immunizations were reviewed and are up to date. Pt is losing weight with diet and exercise. Pap done today.

## 2019-01-29 NOTE — PROGRESS NOTES
HPI:    Patient ID: Chase Villareal is a 48year old female. Pt is here for a physical and pap smear. Review of Systems   Constitutional: Negative for chills, diaphoresis and fever.    HENT: Negative for congestion, ear pain, nosebleeds, sore total) by mouth every 8 (eight) hours as needed for Nausea. Disp: 20 tablet Rfl: 2   Fluticasone Propionate 50 MCG/ACT Nasal Suspension 2 sprays by Each Nare route daily.  Disp: 1 Bottle Rfl: 2   ibuprofen 800 MG Oral Tab Take 1 tablet (800 mg total) by dayana oriented to person, place, and time. She appears well-developed and well-nourished. HENT:   Head: Normocephalic and atraumatic.    Right Ear: Tympanic membrane normal.   Left Ear: Tympanic membrane normal.   Nose: Nose normal.   Mouth/Throat: Oropharynx i done today. Relevant Orders    CBC WITH DIFFERENTIAL WITH PLATELET    TSH W REFLEX TO FREE T4    COMP METABOLIC PANEL (14)    LIPID PANEL       Unprioritized    Kidney donor     Her GFR is good. Exercise-induced asthma     ACT 25.   AAP done

## 2019-01-31 ENCOUNTER — LAB ENCOUNTER (OUTPATIENT)
Dept: LAB | Facility: HOSPITAL | Age: 54
End: 2019-01-31
Attending: INTERNAL MEDICINE
Payer: COMMERCIAL

## 2019-01-31 DIAGNOSIS — Z00.00 ROUTINE HEALTH MAINTENANCE: ICD-10-CM

## 2019-01-31 DIAGNOSIS — E53.8 VITAMIN B12 DEFICIENCY: ICD-10-CM

## 2019-01-31 LAB
ALBUMIN SERPL BCP-MCNC: 3.8 G/DL (ref 3.5–4.8)
ALBUMIN/GLOB SERPL: 1.3 {RATIO} (ref 1–2)
ALP SERPL-CCNC: 74 U/L (ref 32–100)
ALT SERPL-CCNC: 12 U/L (ref 14–54)
ANION GAP SERPL CALC-SCNC: 9 MMOL/L (ref 0–18)
AST SERPL-CCNC: 16 U/L (ref 15–41)
BASOPHILS # BLD AUTO: 0.06 X10(3) UL (ref 0–0.2)
BASOPHILS NFR BLD AUTO: 1.1 %
BILIRUB SERPL-MCNC: 0.8 MG/DL (ref 0.3–1.2)
BUN SERPL-MCNC: 11 MG/DL (ref 8–20)
BUN/CREAT SERPL: 9.5 (ref 10–20)
CALCIUM SERPL-MCNC: 8.9 MG/DL (ref 8.5–10.5)
CHLORIDE SERPL-SCNC: 111 MMOL/L (ref 95–110)
CHOLEST SERPL-MCNC: 132 MG/DL (ref 110–200)
CO2 SERPL-SCNC: 21 MMOL/L (ref 22–32)
CREAT SERPL-MCNC: 1.16 MG/DL (ref 0.5–1.5)
DEPRECATED RDW RBC AUTO: 47.6 FL (ref 35.1–46.3)
EOSINOPHIL # BLD AUTO: 0.26 X10(3) UL (ref 0–0.7)
EOSINOPHIL NFR BLD AUTO: 4.6 %
ERYTHROCYTE [DISTWIDTH] IN BLOOD BY AUTOMATED COUNT: 15 % (ref 11–15)
GLOBULIN PLAS-MCNC: 3 G/DL (ref 2.5–3.7)
GLUCOSE SERPL-MCNC: 93 MG/DL (ref 70–99)
HCT VFR BLD AUTO: 40.6 % (ref 35–48)
HDLC SERPL-MCNC: 44 MG/DL
HGB BLD-MCNC: 12.9 G/DL (ref 12–16)
IMM GRANULOCYTES # BLD AUTO: 0.02 X10(3) UL (ref 0–1)
IMM GRANULOCYTES NFR BLD: 0.4 %
LDLC SERPL CALC-MCNC: 76 MG/DL (ref 0–99)
LYMPHOCYTES # BLD AUTO: 1.76 X10(3) UL (ref 1–4)
LYMPHOCYTES NFR BLD AUTO: 30.9 %
MCH RBC QN AUTO: 27.6 PG (ref 26–34)
MCHC RBC AUTO-ENTMCNC: 31.8 G/DL (ref 31–37)
MCV RBC AUTO: 86.8 FL (ref 80–100)
MONOCYTES # BLD AUTO: 0.36 X10(3) UL (ref 0.1–1)
MONOCYTES NFR BLD AUTO: 6.3 %
NEUTROPHILS # BLD AUTO: 3.24 X10 (3) UL (ref 1.5–7.7)
NEUTROPHILS # BLD AUTO: 3.24 X10(3) UL (ref 1.5–7.7)
NEUTROPHILS NFR BLD AUTO: 56.7 %
NONHDLC SERPL-MCNC: 88 MG/DL
OSMOLALITY UR CALC.SUM OF ELEC: 291 MOSM/KG (ref 275–295)
PATIENT FASTING: YES
PLATELET # BLD AUTO: 236 10(3)UL (ref 150–450)
POTASSIUM SERPL-SCNC: 3.5 MMOL/L (ref 3.3–5.1)
PROT SERPL-MCNC: 6.8 G/DL (ref 5.9–8.4)
RBC # BLD AUTO: 4.68 X10(6)UL (ref 3.8–5.3)
SODIUM SERPL-SCNC: 141 MMOL/L (ref 136–144)
TRIGL SERPL-MCNC: 59 MG/DL (ref 1–149)
TSH SERPL-ACNC: 1.52 UIU/ML (ref 0.45–5.33)
VIT B12 SERPL-MCNC: 331 PG/ML (ref 181–914)
WBC # BLD AUTO: 5.7 X10(3) UL (ref 4–11)

## 2019-01-31 PROCEDURE — 82607 VITAMIN B-12: CPT

## 2019-01-31 PROCEDURE — 36415 COLL VENOUS BLD VENIPUNCTURE: CPT

## 2019-01-31 PROCEDURE — 84443 ASSAY THYROID STIM HORMONE: CPT

## 2019-01-31 PROCEDURE — 80061 LIPID PANEL: CPT

## 2019-01-31 PROCEDURE — 80053 COMPREHEN METABOLIC PANEL: CPT

## 2019-01-31 PROCEDURE — 85025 COMPLETE CBC W/AUTO DIFF WBC: CPT

## 2019-02-01 ENCOUNTER — OFFICE VISIT (OUTPATIENT)
Dept: PODIATRY CLINIC | Facility: CLINIC | Age: 54
End: 2019-02-01
Payer: COMMERCIAL

## 2019-02-01 VITALS — SYSTOLIC BLOOD PRESSURE: 136 MMHG | HEART RATE: 77 BPM | DIASTOLIC BLOOD PRESSURE: 76 MMHG

## 2019-02-01 DIAGNOSIS — L60.0 INGROWN TOENAIL OF LEFT FOOT WITH INFECTION: Primary | ICD-10-CM

## 2019-02-01 PROCEDURE — 11750 EXCISION NAIL&NAIL MATRIX: CPT | Performed by: PODIATRIST

## 2019-02-01 NOTE — PROGRESS NOTES
HPI:    Patient ID: Johana Cantu is a 48year old female. This 21-year-old female presents for correction of ingrown toenail left great toe. After review of the procedure patient signed a written consent.       ROS:              Current Outpatient MAURO toe with a combination of Xylocaine and Marcaine. After the usual prep I performed a partial ingrown toenail procedure on the medial border of this left great toe.   After partial avulsion phenol was applied to the mid proximal area followed by a Betadine

## 2019-02-04 LAB — HPV I/H RISK 1 DNA SPEC QL NAA+PROBE: NEGATIVE

## 2019-02-05 ENCOUNTER — TELEPHONE (OUTPATIENT)
Dept: INTERNAL MEDICINE CLINIC | Facility: CLINIC | Age: 54
End: 2019-02-05

## 2019-02-06 ENCOUNTER — TELEPHONE (OUTPATIENT)
Dept: OBGYN CLINIC | Facility: CLINIC | Age: 54
End: 2019-02-06

## 2019-02-06 NOTE — TELEPHONE ENCOUNTER
rcv'd call from Dr Stahl Saliva last night, needs to see see Gyne; abn'l pap  No avail, katia't; please advise

## 2019-02-06 NOTE — TELEPHONE ENCOUNTER
Called pt. Her recent pap showed LSIL. High risk HPV was negative. Previous pap smear from 2016 on care everywhere was ASCUS with HPV negative. Will refer to gyne.       THINPREP PAP TEST WITH HPV REGARDLESS5/9/2016  [place] [address] COLLECTION::DIAGNOSTIC: (SEE COMMENTS)    SOURCE::CERVICAL        Specimen(s) Submitted:     PAP with High Risk HPV        Procedures/Addenda:    HPV, High Risk_IL:    Date Ordered:     5/6/2016     Date Reported:5/6/2016        Interpretation    Sherron THOMAS

## 2019-02-07 ENCOUNTER — TELEPHONE (OUTPATIENT)
Dept: SCHEDULING | Age: 54
End: 2019-02-07

## 2019-02-07 DIAGNOSIS — M54.9 BACK PAIN, UNSPECIFIED BACK LOCATION, UNSPECIFIED BACK PAIN LATERALITY, UNSPECIFIED CHRONICITY: Primary | ICD-10-CM

## 2019-02-07 RX ORDER — IBUPROFEN 800 MG/1
800 TABLET ORAL 3 TIMES DAILY
Qty: 60 TABLET | Refills: 3 | Status: SHIPPED | OUTPATIENT
Start: 2019-02-07 | End: 2019-05-16 | Stop reason: SDUPTHER

## 2019-02-07 RX ORDER — IBUPROFEN 800 MG/1
800 TABLET ORAL 3 TIMES DAILY
COMMUNITY
Start: 2018-05-31 | End: 2019-02-07 | Stop reason: SDUPTHER

## 2019-02-12 ENCOUNTER — OFFICE VISIT (OUTPATIENT)
Dept: SURGERY | Facility: CLINIC | Age: 54
End: 2019-02-12
Payer: COMMERCIAL

## 2019-02-12 VITALS
HEIGHT: 69 IN | HEART RATE: 69 BPM | WEIGHT: 194 LBS | BODY MASS INDEX: 28.73 KG/M2 | OXYGEN SATURATION: 99 % | DIASTOLIC BLOOD PRESSURE: 82 MMHG | SYSTOLIC BLOOD PRESSURE: 124 MMHG | RESPIRATION RATE: 16 BRPM

## 2019-02-12 DIAGNOSIS — I10 ESSENTIAL HYPERTENSION: Primary | ICD-10-CM

## 2019-02-12 DIAGNOSIS — Z51.81 ENCOUNTER FOR THERAPEUTIC DRUG MONITORING: ICD-10-CM

## 2019-02-12 DIAGNOSIS — E53.8 VITAMIN B12 DEFICIENCY: ICD-10-CM

## 2019-02-12 DIAGNOSIS — E66.3 OVERWEIGHT (BMI 25.0-29.9): ICD-10-CM

## 2019-02-12 PROCEDURE — 99214 OFFICE O/P EST MOD 30 MIN: CPT | Performed by: INTERNAL MEDICINE

## 2019-02-12 RX ORDER — TOPIRAMATE 50 MG/1
50 TABLET, FILM COATED ORAL 2 TIMES DAILY
Qty: 180 TABLET | Refills: 1 | Status: SHIPPED | OUTPATIENT
Start: 2019-02-12 | End: 2021-01-26

## 2019-02-12 RX ORDER — PHENTERMINE HYDROCHLORIDE 30 MG/1
30 CAPSULE ORAL EVERY MORNING
Qty: 30 CAPSULE | Refills: 2 | Status: SHIPPED | OUTPATIENT
Start: 2019-02-12 | End: 2019-03-11 | Stop reason: DRUGHIGH

## 2019-02-12 NOTE — PROGRESS NOTES
[address] [place], [place]  [address] Luisa Dong  [facility] [address] [facility],4[address]  Dept: [phone]     Date:  18    Patient:  Thomas Heron  :      1965  MRN:      [id number]    Chief Complaint: Nasal Suspension 2 sprays by Each Nare route daily. Disp: 1 Bottle Rfl: 2   ibuprofen 800 MG Oral Tab Take 1 tablet (800 mg total) by mouth every 8 (eight) hours as needed.  Disp: 30 tablet Rfl: 5   Cyproheptadine HCl 2 MG/5ML Oral Syrup Take by mouth every EGD  2011   • ESOPHAGOGASTRODUODENOSCOPY (EGD) N/A 9/5/2017    Performed by Briana Mesa MD at [address] [place] [address] ENDOSCOPY   • NEEDLE BIOPSY RIGHT     • NEPHRECTOMY Right 1999   • NEPHRECTOMY     • OTHER  2015    UFE     Family History:    Family History   Problem R Negative. Negative for cough, shortness of breath and wheezing. Cardiovascular: Negative. Negative for chest pain, palpitations and leg swelling. Gastrointestinal: Negative.   Negative for abdominal distention, abdominal pain, constipation, diarrhea, servings/day  5. Keep carbs at or below 100g/day  6. Avoid skipping meals  7.  Prepare meals and snacks in advance    Tolerating topiramate will refill  Kidney donor    Tolerating phentermine  Will increase to 30 mg  Needs ekg    Denise Franco MD

## 2019-02-20 ENCOUNTER — OFFICE VISIT (OUTPATIENT)
Dept: OBGYN CLINIC | Facility: CLINIC | Age: 54
End: 2019-02-20
Payer: COMMERCIAL

## 2019-02-20 VITALS
SYSTOLIC BLOOD PRESSURE: 114 MMHG | WEIGHT: 194 LBS | DIASTOLIC BLOOD PRESSURE: 80 MMHG | BODY MASS INDEX: 29 KG/M2 | HEART RATE: 89 BPM

## 2019-02-20 DIAGNOSIS — R87.612 LGSIL ON PAP SMEAR OF CERVIX: Primary | ICD-10-CM

## 2019-02-20 NOTE — PROGRESS NOTES
Priscilla Driver    8/18/1965       Patient presents with:  Gyn Exam: Pt in for abnormal pap.  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 She just needed to meet me so that colpo can be scheduled.   She had an LSIL pap with her pcp 1/29/19

## 2019-02-22 ENCOUNTER — OFFICE VISIT (OUTPATIENT)
Dept: OBGYN CLINIC | Facility: CLINIC | Age: 54
End: 2019-02-22
Payer: COMMERCIAL

## 2019-02-22 VITALS — DIASTOLIC BLOOD PRESSURE: 91 MMHG | SYSTOLIC BLOOD PRESSURE: 111 MMHG | HEART RATE: 92 BPM

## 2019-02-22 DIAGNOSIS — R87.612 PAPANICOLAOU SMEAR OF CERVIX WITH LOW GRADE SQUAMOUS INTRAEPITHELIAL LESION (LGSIL): Primary | ICD-10-CM

## 2019-02-22 PROCEDURE — 57454 BX/CURETT OF CERVIX W/SCOPE: CPT | Performed by: OBSTETRICS & GYNECOLOGY

## 2019-02-22 NOTE — PROCEDURES
Colpo w/Cx Biopsy and ECC    Pregnancy Results: n/a  Birth control method(s) used:     Consent signed. Procedure discussed with patient in detail including indication, risk, benefits, alternatives and complications.     Pre-Procedure:  Pre-Meds:  none    C

## 2019-03-05 ENCOUNTER — OFFICE VISIT (OUTPATIENT)
Dept: INTERNAL MEDICINE CLINIC | Facility: CLINIC | Age: 54
End: 2019-03-05
Payer: COMMERCIAL

## 2019-03-05 VITALS
DIASTOLIC BLOOD PRESSURE: 81 MMHG | BODY MASS INDEX: 29 KG/M2 | SYSTOLIC BLOOD PRESSURE: 122 MMHG | HEART RATE: 73 BPM | TEMPERATURE: 98 F | HEIGHT: 69 IN

## 2019-03-05 DIAGNOSIS — J06.9 UPPER RESPIRATORY TRACT INFECTION, UNSPECIFIED TYPE: Primary | ICD-10-CM

## 2019-03-05 LAB
FLUAV + FLUBV RNA SPEC NAA+PROBE: NEGATIVE

## 2019-03-05 PROCEDURE — 99213 OFFICE O/P EST LOW 20 MIN: CPT | Performed by: INTERNAL MEDICINE

## 2019-03-05 PROCEDURE — 99212 OFFICE O/P EST SF 10 MIN: CPT | Performed by: INTERNAL MEDICINE

## 2019-03-05 RX ORDER — CODEINE PHOSPHATE AND GUAIFENESIN 10; 100 MG/5ML; MG/5ML
5 SOLUTION ORAL EVERY 6 HOURS PRN
Qty: 240 ML | Refills: 0 | Status: SHIPPED | OUTPATIENT
Start: 2019-03-05 | End: 2019-03-15

## 2019-03-05 NOTE — PROGRESS NOTES
HPI:    Patient ID: Priscilla Driver is a 48year old female. Pt has been sick since yesterday. She has chills. Cough   This is a new problem. The current episode started yesterday. The problem has been unchanged. The problem occurs constantly. Disp:  Rfl:    Clobetasol Propionate 0.05 % External Cream Apply 1 Application topically 2 (two) times daily. Disp: 45 g Rfl: 1   Fluticasone Propionate 50 MCG/ACT Nasal Suspension 2 sprays by Each Nare route daily.  Disp: 1 Bottle Rfl: 2   Doxepin HCl (Z type  Advised pt to drink plenty of fluids and to rest.  Over-the-counter med for symptomatic relief. Will check for flu  Note for work. - guaiFENesin-codeine (CHERATUSSIN AC) 100-10 MG/5ML Oral Solution;  Take 5 mL by mouth every 6 (six) hours as needed

## 2019-03-05 NOTE — PATIENT INSTRUCTIONS
Use an extra humidifier in the bedroom. Fill it every evening and empty every morning. Use hot air humidifier. Take Sudafed (pseudoephedrine) as needed. You get it from the pharmacist, because it is not over-the-counter.   Take Mucinex for the thick

## 2019-03-20 ENCOUNTER — HOSPITAL ENCOUNTER (EMERGENCY)
Facility: HOSPITAL | Age: 54
Discharge: HOME | End: 2019-03-21
Attending: EMERGENCY MEDICINE
Payer: COMMERCIAL

## 2019-03-20 ENCOUNTER — APPOINTMENT (OUTPATIENT)
Dept: CT IMAGING | Facility: HOSPITAL | Age: 54
End: 2019-03-20
Attending: EMERGENCY MEDICINE
Payer: COMMERCIAL

## 2019-03-20 DIAGNOSIS — K59.00 CONSTIPATION, UNSPECIFIED CONSTIPATION TYPE: Primary | ICD-10-CM

## 2019-03-20 LAB
ALBUMIN SERPL-MCNC: 3.5 G/DL (ref 3.4–5)
ALP LIVER SERPL-CCNC: 96 U/L (ref 41–108)
ALT SERPL-CCNC: 15 U/L (ref 13–56)
ANION GAP SERPL CALC-SCNC: 7 MMOL/L (ref 0–18)
AST SERPL-CCNC: 17 U/L (ref 15–37)
BASOPHILS # BLD AUTO: 0.05 X10(3) UL (ref 0–0.2)
BASOPHILS NFR BLD AUTO: 0.6 %
BILIRUB DIRECT SERPL-MCNC: <0.1 MG/DL (ref 0–0.2)
BILIRUB SERPL-MCNC: 0.4 MG/DL (ref 0.1–2)
BILIRUB UR QL: NEGATIVE
BUN BLD-MCNC: 14 MG/DL (ref 7–18)
BUN/CREAT SERPL: 12.3 (ref 10–20)
CALCIUM BLD-MCNC: 8.9 MG/DL (ref 8.5–10.1)
CHLORIDE SERPL-SCNC: 109 MMOL/L (ref 98–107)
CLARITY UR: CLEAR
CO2 SERPL-SCNC: 25 MMOL/L (ref 21–32)
COLOR UR: YELLOW
CREAT BLD-MCNC: 1.14 MG/DL (ref 0.55–1.02)
DEPRECATED RDW RBC AUTO: 48.9 FL (ref 35.1–46.3)
EOSINOPHIL # BLD AUTO: 0.27 X10(3) UL (ref 0–0.7)
EOSINOPHIL NFR BLD AUTO: 3.2 %
ERYTHROCYTE [DISTWIDTH] IN BLOOD BY AUTOMATED COUNT: 15 % (ref 11–15)
GLUCOSE BLD-MCNC: 104 MG/DL (ref 70–99)
GLUCOSE UR-MCNC: NEGATIVE MG/DL
HCT VFR BLD AUTO: 41.4 % (ref 35–48)
HGB BLD-MCNC: 12.9 G/DL (ref 12–16)
HGB UR QL STRIP.AUTO: NEGATIVE
IMM GRANULOCYTES # BLD AUTO: 0.03 X10(3) UL (ref 0–1)
IMM GRANULOCYTES NFR BLD: 0.4 %
KETONES UR-MCNC: NEGATIVE MG/DL
LEUKOCYTE ESTERASE UR QL STRIP.AUTO: NEGATIVE
LIPASE SERPL-CCNC: 138 U/L (ref 73–393)
LYMPHOCYTES # BLD AUTO: 2.11 X10(3) UL (ref 1–4)
LYMPHOCYTES NFR BLD AUTO: 25.1 %
M PROTEIN MFR SERPL ELPH: 7.5 G/DL (ref 6.4–8.2)
MCH RBC QN AUTO: 27.6 PG (ref 26–34)
MCHC RBC AUTO-ENTMCNC: 31.2 G/DL (ref 31–37)
MCV RBC AUTO: 88.7 FL (ref 80–100)
MONOCYTES # BLD AUTO: 0.69 X10(3) UL (ref 0.1–1)
MONOCYTES NFR BLD AUTO: 8.2 %
NEUTROPHILS # BLD AUTO: 5.27 X10 (3) UL (ref 1.5–7.7)
NEUTROPHILS # BLD AUTO: 5.27 X10(3) UL (ref 1.5–7.7)
NEUTROPHILS NFR BLD AUTO: 62.5 %
NITRITE UR QL STRIP.AUTO: NEGATIVE
OSMOLALITY SERPL CALC.SUM OF ELEC: 293 MOSM/KG (ref 275–295)
PH UR: 5 [PH] (ref 5–8)
PLATELET # BLD AUTO: 258 10(3)UL (ref 150–450)
POTASSIUM SERPL-SCNC: 4.1 MMOL/L (ref 3.5–5.1)
PROT UR-MCNC: NEGATIVE MG/DL
RBC # BLD AUTO: 4.67 X10(6)UL (ref 3.8–5.3)
SODIUM SERPL-SCNC: 141 MMOL/L (ref 136–145)
SP GR UR STRIP: 1.02 (ref 1–1.03)
UROBILINOGEN UR STRIP-ACNC: <2
VIT C UR-MCNC: NEGATIVE MG/DL
WBC # BLD AUTO: 8.4 X10(3) UL (ref 4–11)

## 2019-03-20 PROCEDURE — 96360 HYDRATION IV INFUSION INIT: CPT

## 2019-03-20 PROCEDURE — 80048 BASIC METABOLIC PNL TOTAL CA: CPT | Performed by: EMERGENCY MEDICINE

## 2019-03-20 PROCEDURE — 85025 COMPLETE CBC W/AUTO DIFF WBC: CPT

## 2019-03-20 PROCEDURE — 96361 HYDRATE IV INFUSION ADD-ON: CPT

## 2019-03-20 PROCEDURE — 80076 HEPATIC FUNCTION PANEL: CPT | Performed by: EMERGENCY MEDICINE

## 2019-03-20 PROCEDURE — 74177 CT ABD & PELVIS W/CONTRAST: CPT | Performed by: EMERGENCY MEDICINE

## 2019-03-20 PROCEDURE — 99284 EMERGENCY DEPT VISIT MOD MDM: CPT

## 2019-03-20 PROCEDURE — 85025 COMPLETE CBC W/AUTO DIFF WBC: CPT | Performed by: EMERGENCY MEDICINE

## 2019-03-20 PROCEDURE — C9113 INJ PANTOPRAZOLE SODIUM, VIA: HCPCS | Performed by: EMERGENCY MEDICINE

## 2019-03-20 PROCEDURE — 83690 ASSAY OF LIPASE: CPT | Performed by: EMERGENCY MEDICINE

## 2019-03-20 PROCEDURE — 80048 BASIC METABOLIC PNL TOTAL CA: CPT

## 2019-03-20 PROCEDURE — 81003 URINALYSIS AUTO W/O SCOPE: CPT | Performed by: EMERGENCY MEDICINE

## 2019-03-20 RX ORDER — POLYETHYLENE GLYCOL 3350 17 G/17G
17 POWDER, FOR SOLUTION ORAL DAILY PRN
Qty: 12 EACH | Refills: 0 | Status: SHIPPED | OUTPATIENT
Start: 2019-03-20 | End: 2019-03-20

## 2019-03-20 RX ORDER — MORPHINE SULFATE 4 MG/ML
2 INJECTION, SOLUTION INTRAMUSCULAR; INTRAVENOUS ONCE
Status: DISCONTINUED | OUTPATIENT
Start: 2019-03-20 | End: 2019-03-20

## 2019-03-20 RX ORDER — DOCUSATE SODIUM 100 MG/1
100 CAPSULE, LIQUID FILLED ORAL 2 TIMES DAILY PRN
Qty: 60 CAPSULE | Refills: 0 | Status: SHIPPED | OUTPATIENT
Start: 2019-03-20 | End: 2019-03-20

## 2019-03-20 RX ORDER — FAMOTIDINE 10 MG/ML
20 INJECTION, SOLUTION INTRAVENOUS ONCE
Status: DISCONTINUED | OUTPATIENT
Start: 2019-03-20 | End: 2019-03-20

## 2019-03-20 RX ORDER — FAMOTIDINE 10 MG/ML
20 INJECTION, SOLUTION INTRAVENOUS ONCE
Status: DISCONTINUED | OUTPATIENT
Start: 2019-03-20 | End: 2019-03-21

## 2019-03-20 RX ORDER — DICYCLOMINE HCL 20 MG
20 TABLET ORAL ONCE
Status: COMPLETED | OUTPATIENT
Start: 2019-03-20 | End: 2019-03-20

## 2019-03-20 RX ORDER — DICYCLOMINE HCL 20 MG
20 TABLET ORAL 4 TIMES DAILY PRN
Qty: 40 TABLET | Refills: 0 | Status: SHIPPED | OUTPATIENT
Start: 2019-03-20 | End: 2019-03-30

## 2019-03-21 ENCOUNTER — TELEPHONE (OUTPATIENT)
Dept: GASTROENTEROLOGY | Facility: CLINIC | Age: 54
End: 2019-03-21

## 2019-03-21 VITALS
SYSTOLIC BLOOD PRESSURE: 125 MMHG | HEART RATE: 86 BPM | OXYGEN SATURATION: 100 % | BODY MASS INDEX: 29 KG/M2 | WEIGHT: 195 LBS | TEMPERATURE: 98 F | RESPIRATION RATE: 17 BRPM | DIASTOLIC BLOOD PRESSURE: 75 MMHG

## 2019-03-21 NOTE — TELEPHONE ENCOUNTER
Pt scheduled for the following, aware to arrive 15 mins earlier to [facility]  Future Appointments   Date Time Provider Eris Camara   3/25/2019  8:30 AM Colonel Shin MD [facility]

## 2019-03-21 NOTE — ED PROVIDER NOTES
Patient Seen in: [facility] AND [facility] Emergency Department    History   No chief complaint on file. Stated Complaint: ABD Pain     HPI    47 yo F with PMH HTN presenting for evaluation of several days of constant right sided abdominal pain.  No sick contact Take 1 tablet by mouth daily. TraMADol HCl 50 MG Oral Tab,  Take 50 mg by mouth every 8 (eight) hours as needed.          Family History   Problem Relation Age of Onset   • Cancer Father         prostate   • Hypertension Father    • Diabetes Mother    • H reviewed.         ED Course     Labs Reviewed   BASIC METABOLIC PANEL (8) - Abnormal; Notable for the following components:       Result Value    Glucose 104 (*)     Chloride 109 (*)     Creatinine 1.14 (*)     GFR, Non- 56 (*)     All other entity to which it is addressed.  If you are not the intended recipient of this report, you are hereby notified that any copying, distribution, dissemination or action taken in relation to the contents of this report is strictly prohibited and may be unlawf

## 2019-03-21 NOTE — TELEPHONE ENCOUNTER
GI Clinical Staff:   Please call patient for f/u after ER visit, I can see her this Monday 3/25 @ 8:45AM, arrive at 8:30AM. thx

## 2019-03-21 NOTE — TELEPHONE ENCOUNTER
Pt contacted and reviewed Dr. Raffaele Salazar message below, she verbalized understanding and accepted the following appt: 3/25/19 @ 08:45AM (arrival time 8:30AM). -Awaiting NUZHAT to open template to add pt to provider schedule at this time.

## 2019-03-21 NOTE — ED INITIAL ASSESSMENT (HPI)
Pt states abdominal pain began 4-5 days ago has been worsening. Pain is more on the right side.  Denies any n/v/d

## 2019-03-25 ENCOUNTER — OFFICE VISIT (OUTPATIENT)
Dept: GASTROENTEROLOGY | Facility: CLINIC | Age: 54
End: 2019-03-25
Payer: COMMERCIAL

## 2019-03-25 ENCOUNTER — TELEPHONE (OUTPATIENT)
Dept: GASTROENTEROLOGY | Facility: CLINIC | Age: 54
End: 2019-03-25

## 2019-03-25 VITALS
HEIGHT: 69 IN | HEART RATE: 77 BPM | SYSTOLIC BLOOD PRESSURE: 122 MMHG | BODY MASS INDEX: 29 KG/M2 | DIASTOLIC BLOOD PRESSURE: 84 MMHG

## 2019-03-25 DIAGNOSIS — Z12.11 SCREEN FOR COLON CANCER: Primary | ICD-10-CM

## 2019-03-25 DIAGNOSIS — R10.30 LOWER ABDOMINAL PAIN: ICD-10-CM

## 2019-03-25 PROBLEM — A04.8 H. PYLORI INFECTION: Status: RESOLVED | Noted: 2017-09-21 | Resolved: 2019-03-25

## 2019-03-25 PROCEDURE — 99214 OFFICE O/P EST MOD 30 MIN: CPT | Performed by: INTERNAL MEDICINE

## 2019-03-25 PROCEDURE — 99212 OFFICE O/P EST SF 10 MIN: CPT | Performed by: INTERNAL MEDICINE

## 2019-03-25 RX ORDER — POLYETHYLENE GLYCOL 3350, SODIUM CHLORIDE, SODIUM BICARBONATE, POTASSIUM CHLORIDE 420; 11.2; 5.72; 1.48 G/4L; G/4L; G/4L; G/4L
POWDER, FOR SOLUTION ORAL
Qty: 1 BOTTLE | Refills: 0 | Status: SHIPPED | OUTPATIENT
Start: 2019-03-25 | End: 2019-09-11 | Stop reason: ALTCHOICE

## 2019-03-25 NOTE — PROGRESS NOTES
[facility] Follow-up Visit    Lesly cancers.   - No known history of IBD.     Pertinent Social Hx:  - No tobacco use/Social ETOH  - Rare NSAIDs/no daily ASA  - Lives with: family at home   - Occupation: RN admit at [employer]      Endoscopy Hx:  - EGD with Dr. Kranthi Molina September 2017 per mouth every morning. Disp: 30 capsule Rfl: 1   topiramate 50 MG Oral Tab Take 1 tablet (50 mg total) by mouth 2 (two) times daily.  Disp: 180 tablet Rfl: 1   Albuterol Sulfate HFA (PROAIR HFA) 108 (90 Base) MCG/ACT Inhalation Aero Soln Inhale 2 puffs into t 9\" (1.753 m), not currently breastfeeding.     Gen- Patient appears comfortable and in no acute discomfort  HEENT: the sclera appears anicteric, oropharynx clear, mucus membranes appear moist  CV- regular rate and rhythm, the extremities are warm and well bleeding, infection, pain, death, as well as the risks of anesthesia and perforation all leading to prolonged hospitalization, surgical intervention, or even death.  I also specifically mentioned the miss rate of colonoscopy of 5-10% in the best of all circ

## 2019-03-25 NOTE — TELEPHONE ENCOUNTER
Scheduled for:  Colonoscopy [address]  Provider Name:   Date:  4/15/19  Location:  [facility]  Sedation:  MAC  Time:  1245pm (pt is aware that [address] will call the day before to confirm arrival time)  Prep: Trilyte  Meds/Allergies Reconciled?:  Physician reviewed

## 2019-03-25 NOTE — PATIENT INSTRUCTIONS
1. Schedule colonoscopy with MAC @ [facility] [Diagnosis: screening]    2.  bowel prep from pharmacy (split Trilyte)    3. Continue all medications for procedure  -If on on phentermine, hold for 7 days before procedure    4.  Read all bowel prep instruction

## 2019-03-26 ENCOUNTER — OFFICE VISIT (OUTPATIENT)
Dept: OBGYN CLINIC | Facility: CLINIC | Age: 54
End: 2019-03-26
Payer: COMMERCIAL

## 2019-03-26 VITALS — HEART RATE: 81 BPM | SYSTOLIC BLOOD PRESSURE: 121 MMHG | DIASTOLIC BLOOD PRESSURE: 83 MMHG

## 2019-03-26 DIAGNOSIS — N92.1 MENORRHAGIA WITH IRREGULAR CYCLE: Primary | ICD-10-CM

## 2019-03-26 DIAGNOSIS — D21.9 FIBROIDS: ICD-10-CM

## 2019-03-26 PROCEDURE — 99214 OFFICE O/P EST MOD 30 MIN: CPT | Performed by: OBSTETRICS & GYNECOLOGY

## 2019-03-26 NOTE — PROGRESS NOTES
Chidi Holly    1965       Patient presents with:  Gyn Problem: PT STATES SHE HAD 2 PERIODS SINCE HAVING COLPOSCOPY, AND ALSO FIBROIDS  pt had colpo last month and light spotting for 1 day then it stopped.   Then a week later she had a klaus Rfl: 0   Phentermine HCl 15 MG Oral Cap Take 1 capsule (15 mg total) by mouth every morning. Disp: 30 capsule Rfl: 1   topiramate 50 MG Oral Tab Take 1 tablet (50 mg total) by mouth 2 (two) times daily.  Disp: 180 tablet Rfl: 1   Albuterol Sulfate HFA (PROA discharge  Cervix:  Normal without tenderness on motion  Uterus:  Enlarged c/w fibroids , without tenderness  Adnexa: normal without masses or tenderness  Perineum: normal  Anus: no hemorhroids  Lymphatic: no palpable pelvic nodes     Karen was seen toda

## 2019-04-02 ENCOUNTER — HOSPITAL ENCOUNTER (OUTPATIENT)
Dept: ULTRASOUND IMAGING | Facility: HOSPITAL | Age: 54
Discharge: HOME | End: 2019-04-02
Attending: OBSTETRICS & GYNECOLOGY
Payer: COMMERCIAL

## 2019-04-02 ENCOUNTER — OFFICE VISIT (OUTPATIENT)
Dept: OBGYN CLINIC | Facility: CLINIC | Age: 54
End: 2019-04-02
Payer: COMMERCIAL

## 2019-04-02 VITALS — SYSTOLIC BLOOD PRESSURE: 123 MMHG | DIASTOLIC BLOOD PRESSURE: 82 MMHG | HEART RATE: 73 BPM

## 2019-04-02 DIAGNOSIS — N92.1 MENORRHAGIA WITH IRREGULAR CYCLE: ICD-10-CM

## 2019-04-02 DIAGNOSIS — D21.9 FIBROIDS: ICD-10-CM

## 2019-04-02 DIAGNOSIS — N92.0 EXCESSIVE OR FREQUENT MENSTRUATION: Primary | ICD-10-CM

## 2019-04-02 PROCEDURE — 58100 BIOPSY OF UTERUS LINING: CPT | Performed by: OBSTETRICS & GYNECOLOGY

## 2019-04-02 PROCEDURE — 76830 TRANSVAGINAL US NON-OB: CPT | Performed by: OBSTETRICS & GYNECOLOGY

## 2019-04-02 PROCEDURE — 76856 US EXAM PELVIC COMPLETE: CPT | Performed by: OBSTETRICS & GYNECOLOGY

## 2019-04-04 ENCOUNTER — TELEPHONE (OUTPATIENT)
Dept: OBGYN CLINIC | Facility: CLINIC | Age: 54
End: 2019-04-04

## 2019-04-04 DIAGNOSIS — N92.1 MENORRHAGIA WITH IRREGULAR CYCLE: Primary | ICD-10-CM

## 2019-04-04 DIAGNOSIS — N84.0 ENDOMETRIAL POLYP: ICD-10-CM

## 2019-04-04 NOTE — TELEPHONE ENCOUNTER
OB GYN SURGICAL SCHEDULING    Assessment: Menorrhagia , endometrial polyp    Pre-Operative Procedure:  Hysteroscopy - surgical, myosure polypectomy    In / on: day 7-10 of cycle if possible, pt began bleeding again yesterday, try for wed 4/10/19 if possible before 3 pm      Admission:  Day Surgery    Anesthesia: General    Additional Orders:  Routine Orders    Comments / Orders to Nurse: please call pt on Monday next week to see if she is still bleeding because if so the surgery will have to be scheduled with her next cycle. Discussed possible complications including but not limited to: Alternatives to surgical intervention discussed with patient in detail., Likely consequences of not undergoing procedure discussed with patient. , anesthesia risks, Ashermann's syndrome, bleeding, infection, injury, bowel / bladder, injury, internal, need for future surgery, perforation of uterus and surgery may not improve symptoms

## 2019-04-05 NOTE — TELEPHONE ENCOUNTER
Lmtcb. Noting pt will needed to rsc the procedure, if she is still bleeding and needing more than a panty liner on Monday and to call us to rsc. My chart message routed with needed info as well.

## 2019-04-05 NOTE — TELEPHONE ENCOUNTER
Patient is scheduled 4/10/19 12:30pm Hysteroscopy - surgical, myosure polypectomy CAP. Pat orders routed. Instructions routed via my chart/copy also placed at .  Pt informed

## 2019-04-10 ENCOUNTER — ANESTHESIA (OUTPATIENT)
Dept: SURGERY | Facility: HOSPITAL | Age: 54
End: 2019-04-10
Payer: COMMERCIAL

## 2019-04-10 ENCOUNTER — HOSPITAL ENCOUNTER (OUTPATIENT)
Facility: HOSPITAL | Age: 54
Setting detail: HOSPITAL OUTPATIENT SURGERY
Discharge: HOME | End: 2019-04-10
Attending: OBSTETRICS & GYNECOLOGY | Admitting: OBSTETRICS & GYNECOLOGY
Payer: COMMERCIAL

## 2019-04-10 ENCOUNTER — ANESTHESIA EVENT (OUTPATIENT)
Dept: SURGERY | Facility: HOSPITAL | Age: 54
End: 2019-04-10
Payer: COMMERCIAL

## 2019-04-10 VITALS
SYSTOLIC BLOOD PRESSURE: 144 MMHG | WEIGHT: 194 LBS | RESPIRATION RATE: 16 BRPM | OXYGEN SATURATION: 98 % | BODY MASS INDEX: 28.73 KG/M2 | HEART RATE: 65 BPM | TEMPERATURE: 98 F | DIASTOLIC BLOOD PRESSURE: 78 MMHG | HEIGHT: 69 IN

## 2019-04-10 DIAGNOSIS — N84.0 ENDOMETRIAL POLYP: ICD-10-CM

## 2019-04-10 DIAGNOSIS — N92.1 MENORRHAGIA WITH IRREGULAR CYCLE: ICD-10-CM

## 2019-04-10 PROCEDURE — 58558 HYSTEROSCOPY BIOPSY: CPT | Performed by: OBSTETRICS & GYNECOLOGY

## 2019-04-10 PROCEDURE — 0UB98ZZ EXCISION OF UTERUS, VIA NATURAL OR ARTIFICIAL OPENING ENDOSCOPIC: ICD-10-PCS | Performed by: OBSTETRICS & GYNECOLOGY

## 2019-04-10 RX ORDER — MORPHINE SULFATE 2 MG/ML
2 INJECTION, SOLUTION INTRAMUSCULAR; INTRAVENOUS EVERY 10 MIN PRN
Status: DISCONTINUED | OUTPATIENT
Start: 2019-04-10 | End: 2019-04-10

## 2019-04-10 RX ORDER — MORPHINE SULFATE 4 MG/ML
4 INJECTION, SOLUTION INTRAMUSCULAR; INTRAVENOUS EVERY 10 MIN PRN
Status: DISCONTINUED | OUTPATIENT
Start: 2019-04-10 | End: 2019-04-10

## 2019-04-10 RX ORDER — MORPHINE SULFATE 10 MG/ML
6 INJECTION, SOLUTION INTRAMUSCULAR; INTRAVENOUS EVERY 10 MIN PRN
Status: DISCONTINUED | OUTPATIENT
Start: 2019-04-10 | End: 2019-04-10

## 2019-04-10 RX ORDER — FAMOTIDINE 20 MG/1
20 TABLET ORAL ONCE
Status: DISCONTINUED | OUTPATIENT
Start: 2019-04-10 | End: 2019-04-10 | Stop reason: CLARIF

## 2019-04-10 RX ORDER — SODIUM CHLORIDE, SODIUM LACTATE, POTASSIUM CHLORIDE, CALCIUM CHLORIDE 600; 310; 30; 20 MG/100ML; MG/100ML; MG/100ML; MG/100ML
INJECTION, SOLUTION INTRAVENOUS CONTINUOUS
Status: DISCONTINUED | OUTPATIENT
Start: 2019-04-10 | End: 2019-04-10

## 2019-04-10 RX ORDER — HALOPERIDOL 5 MG/ML
0.25 INJECTION INTRAMUSCULAR ONCE AS NEEDED
Status: DISCONTINUED | OUTPATIENT
Start: 2019-04-10 | End: 2019-04-10

## 2019-04-10 RX ORDER — ACETAMINOPHEN 500 MG
1000 TABLET ORAL ONCE
Status: COMPLETED | OUTPATIENT
Start: 2019-04-10 | End: 2019-04-10

## 2019-04-10 RX ORDER — DEXAMETHASONE SODIUM PHOSPHATE 4 MG/ML
VIAL (ML) INJECTION AS NEEDED
Status: DISCONTINUED | OUTPATIENT
Start: 2019-04-10 | End: 2019-04-10 | Stop reason: SURG

## 2019-04-10 RX ORDER — LIDOCAINE HYDROCHLORIDE 10 MG/ML
INJECTION, SOLUTION EPIDURAL; INFILTRATION; INTRACAUDAL; PERINEURAL AS NEEDED
Status: DISCONTINUED | OUTPATIENT
Start: 2019-04-10 | End: 2019-04-10 | Stop reason: SURG

## 2019-04-10 RX ORDER — NALOXONE HYDROCHLORIDE 0.4 MG/ML
80 INJECTION, SOLUTION INTRAMUSCULAR; INTRAVENOUS; SUBCUTANEOUS AS NEEDED
Status: DISCONTINUED | OUTPATIENT
Start: 2019-04-10 | End: 2019-04-10

## 2019-04-10 RX ORDER — HYDROCODONE BITARTRATE AND ACETAMINOPHEN 5; 325 MG/1; MG/1
2 TABLET ORAL AS NEEDED
Status: DISCONTINUED | OUTPATIENT
Start: 2019-04-10 | End: 2019-04-10

## 2019-04-10 RX ORDER — MAGNESIUM HYDROXIDE 1200 MG/15ML
LIQUID ORAL CONTINUOUS PRN
Status: COMPLETED | OUTPATIENT
Start: 2019-04-10 | End: 2019-04-10

## 2019-04-10 RX ORDER — METOCLOPRAMIDE 10 MG/1
10 TABLET ORAL ONCE
Status: DISCONTINUED | OUTPATIENT
Start: 2019-04-10 | End: 2019-04-10 | Stop reason: CLARIF

## 2019-04-10 RX ORDER — KETOROLAC TROMETHAMINE 30 MG/ML
30 INJECTION, SOLUTION INTRAMUSCULAR; INTRAVENOUS ONCE
Status: COMPLETED | OUTPATIENT
Start: 2019-04-10 | End: 2019-04-10

## 2019-04-10 RX ORDER — IBUPROFEN 600 MG/1
600 TABLET ORAL EVERY 6 HOURS
Status: CANCELLED | OUTPATIENT
Start: 2019-04-10

## 2019-04-10 RX ORDER — ONDANSETRON 2 MG/ML
4 INJECTION INTRAMUSCULAR; INTRAVENOUS EVERY 8 HOURS PRN
Status: CANCELLED | OUTPATIENT
Start: 2019-04-10

## 2019-04-10 RX ORDER — ONDANSETRON 2 MG/ML
4 INJECTION INTRAMUSCULAR; INTRAVENOUS ONCE AS NEEDED
Status: DISCONTINUED | OUTPATIENT
Start: 2019-04-10 | End: 2019-04-10

## 2019-04-10 RX ORDER — ONDANSETRON 2 MG/ML
INJECTION INTRAMUSCULAR; INTRAVENOUS AS NEEDED
Status: DISCONTINUED | OUTPATIENT
Start: 2019-04-10 | End: 2019-04-10 | Stop reason: SURG

## 2019-04-10 RX ORDER — ONDANSETRON 4 MG/1
4 TABLET, FILM COATED ORAL EVERY 8 HOURS PRN
Status: CANCELLED | OUTPATIENT
Start: 2019-04-10

## 2019-04-10 RX ORDER — SODIUM CHLORIDE, SODIUM LACTATE, POTASSIUM CHLORIDE, CALCIUM CHLORIDE 600; 310; 30; 20 MG/100ML; MG/100ML; MG/100ML; MG/100ML
INJECTION, SOLUTION INTRAVENOUS CONTINUOUS
Status: CANCELLED | OUTPATIENT
Start: 2019-04-10

## 2019-04-10 RX ORDER — HYDROCODONE BITARTRATE AND ACETAMINOPHEN 5; 325 MG/1; MG/1
1 TABLET ORAL AS NEEDED
Status: DISCONTINUED | OUTPATIENT
Start: 2019-04-10 | End: 2019-04-10

## 2019-04-10 RX ORDER — GLYCOPYRROLATE 0.2 MG/ML
INJECTION, SOLUTION INTRAMUSCULAR; INTRAVENOUS AS NEEDED
Status: DISCONTINUED | OUTPATIENT
Start: 2019-04-10 | End: 2019-04-10 | Stop reason: SURG

## 2019-04-10 RX ADMIN — GLYCOPYRROLATE 0.2 MG: 0.2 INJECTION, SOLUTION INTRAMUSCULAR; INTRAVENOUS at 13:45:00

## 2019-04-10 RX ADMIN — LIDOCAINE HYDROCHLORIDE 50 MG: 10 INJECTION, SOLUTION EPIDURAL; INFILTRATION; INTRACAUDAL; PERINEURAL at 13:51:00

## 2019-04-10 RX ADMIN — SODIUM CHLORIDE, SODIUM LACTATE, POTASSIUM CHLORIDE, CALCIUM CHLORIDE: 600; 310; 30; 20 INJECTION, SOLUTION INTRAVENOUS at 14:20:00

## 2019-04-10 RX ADMIN — ONDANSETRON 4 MG: 2 INJECTION INTRAMUSCULAR; INTRAVENOUS at 13:45:00

## 2019-04-10 RX ADMIN — DEXAMETHASONE SODIUM PHOSPHATE 4 MG: 4 MG/ML VIAL (ML) INJECTION at 13:45:00

## 2019-04-10 RX ADMIN — SODIUM CHLORIDE, SODIUM LACTATE, POTASSIUM CHLORIDE, CALCIUM CHLORIDE: 600; 310; 30; 20 INJECTION, SOLUTION INTRAVENOUS at 13:42:00

## 2019-04-10 NOTE — ANESTHESIA PROCEDURE NOTES
Airway  Urgency: elective    Airway not difficult    General Information and Staff    Patient location during procedure: OR  Anesthesiologist: Sherita Sanchez MD  Resident/CRNA: Sal Nissen, CRNA  Performed: CRNA     Indications and Patient Conditi

## 2019-04-10 NOTE — DISCHARGE SUMMARY
[facility] HOSP - [facility]    Discharge Summary    Chidi Holly Patient Status:  Hospital Outpatient Surgery    1965 MRN [id number]   Location [address] UNIT Attending Vera Diaz MD   Hosp Day # 0 ADRI Rangel daily.    TraMADol HCl 50 MG Oral Tab  Take 50 mg by mouth every 8 (eight) hours as needed. PEG 3350-KCl-Na Bicarb-NaCl (TRILYTE) 420 g Oral Recon Soln  As directed.     Ondansetron HCl (ZOFRAN) 4 mg tablet  Take 1 tablet (4 mg total) by mouth every 8 · To experience mild discomforts such as sore lip or tongue, headache, cramps, gas pains or a bloated feeling in your abdomen. · To experience mild back pain or soreness for a day or two if you had spinal or epidural anesthesia.    · If you had laparosc

## 2019-04-10 NOTE — ANESTHESIA PREPROCEDURE EVALUATION
Anesthesia PreOp Note    HPI:     Leonarda Cabrera is a 48year old female who presents for preoperative consultation requested by: Harris Denney MD    Date of Surgery: 4/10/2019    Procedure(s):   MYOMECTOMY HYSTEROSCOPIC  Indication: Menorrhagia with pressure    • History of blood transfusion     2013   • IBS (irritable bowel syndrome)     constipation   • Kidney donor     right   • Pulmonary embolism [facility])     bilateral       Past Surgical History:   Procedure Laterality Date   • COLONOSCOPY  2011   • 3350-KCl-Na Bicarb-NaCl (TRILYTE) 420 g Oral Recon Soln As directed. Disp: 1 Bottle Rfl: 0 Taking   Ondansetron HCl (ZOFRAN) 4 mg tablet Take 1 tablet (4 mg total) by mouth every 8 (eight) hours as needed for Nausea.  Disp: 20 tablet Rfl: 2 3/10/2019 at [facility] [employer] service: Not on file        Active member of club or organization: Not on file        Attends meetings of clubs or organizations: Not on file        Relationship status: Not on file      Intimate partner violence:        Fear of current or ex par Cardiovascular - normal exam  (+) hypertension,     Neuro/Psych - negative ROS     GI/Hepatic/Renal      Comments: Donated one kidney    Endo/Other - negative ROS   Abdominal  - normal exam             Anesthesia Plan:   ASA:  2  Plan:   General  Airway:

## 2019-04-10 NOTE — OPERATIVE REPORT
[facility] OPERATING ROOM  Operative Note     Ade Pruitt Location: OR   [facility] [id number] MRN [id number]   Admission Date 4/10/2019 Operation Date 4/10/2019   Attending Physician Flora Hale MD Operating Physician Steven Gaines.  Page, MD Anderson Buerger

## 2019-04-10 NOTE — H&P
EddaShara 27 Patient Status:  [facility] Outpatient Surgery    1965 MRN [id number]   Location [address] [facility] Attending Veronica [place] MD   Hosp Day # 0 PCP Queenie Davidson, Smokeless tobacco: Never Used    Alcohol use: No      Alcohol/week: 0.0 oz    Drug use: Never      Allergies/Medications:    Allergies:   Amoxicillin             RASH  Bactrim [Sulfametho*    HIVES  Flagyl [Metronidazo*    HIVES  Flu Virus Vaccine extremities normal, atraumatic, no cyanosis or edema  Psychiatric: calm    Cervical Papanicolaou done within 1 year of adm or per ACOG guidelines    Results:     Lab Results   Component Value Date    WBC 8.4 03/20/2019    HGB 12.9 03/20/2019    HCT 41.4 03

## 2019-04-10 NOTE — ANESTHESIA POSTPROCEDURE EVALUATION
Patient: Thomas Camera    Procedure Summary     Date:  04/10/19 Room / Location:  [address] [place] [address] MAIN OR 18 / [address] [place] [address] MAIN OR    Anesthesia Start:  8583 Anesthesia Stop:  0018    Procedure:  MYOMECTOMY HYSTEROSCOPIC (N/A ) Diagnosis:       Menorrhagia with irregular

## 2019-04-15 ENCOUNTER — LAB REQUISITION (OUTPATIENT)
Dept: LAB | Facility: HOSPITAL | Age: 54
End: 2019-04-15
Payer: COMMERCIAL

## 2019-04-15 DIAGNOSIS — Z01.89 ENCOUNTER FOR OTHER SPECIFIED SPECIAL EXAMINATIONS: ICD-10-CM

## 2019-04-15 PROCEDURE — 88305 TISSUE EXAM BY PATHOLOGIST: CPT | Performed by: INTERNAL MEDICINE

## 2019-04-19 ENCOUNTER — TELEPHONE (OUTPATIENT)
Dept: GASTROENTEROLOGY | Facility: CLINIC | Age: 54
End: 2019-04-19

## 2019-04-19 NOTE — TELEPHONE ENCOUNTER
Spoke to pt and states she would like to commence the Linzess 72 mcg daily at this time per your previous conversation. Order pended for your review and sign off. Pt also requesting CLN test results from 4/15/19. Please advise, thank you.

## 2019-04-22 ENCOUNTER — TELEPHONE (OUTPATIENT)
Dept: GASTROENTEROLOGY | Facility: CLINIC | Age: 54
End: 2019-04-22

## 2019-04-22 NOTE — TELEPHONE ENCOUNTER
I mailed out colonoscopy results letter to pt  Updated health maintenance  Entered into 5 yr recall  Recall colon in 5 years per.  Colon done 4/15/19    GI staff: please place recall for colonoscopy in 5 years

## 2019-04-25 ENCOUNTER — OFFICE VISIT (OUTPATIENT)
Dept: OBGYN CLINIC | Facility: CLINIC | Age: 54
End: 2019-04-25
Payer: COMMERCIAL

## 2019-04-25 ENCOUNTER — TELEPHONE (OUTPATIENT)
Dept: OBGYN CLINIC | Facility: CLINIC | Age: 54
End: 2019-04-25

## 2019-04-25 VITALS — SYSTOLIC BLOOD PRESSURE: 131 MMHG | HEART RATE: 74 BPM | DIASTOLIC BLOOD PRESSURE: 91 MMHG

## 2019-04-25 DIAGNOSIS — N84.0 ENDOMETRIAL POLYP: ICD-10-CM

## 2019-04-25 DIAGNOSIS — Z09 POSTOP CHECK: Primary | ICD-10-CM

## 2019-04-25 PROCEDURE — 99213 OFFICE O/P EST LOW 20 MIN: CPT | Performed by: OBSTETRICS & GYNECOLOGY

## 2019-04-25 NOTE — TELEPHONE ENCOUNTER
Pt stated she saw CAP for post op appt today after she had a hys/D&C/myosure polypectomy performed on 4/25/19. Pt stated she didn't get a chance to speak with CAP like she wanted to and she has a few more questions. Pt stated she has questions for CAP in regards to her period. Pt stated that she wasn't really having periods, and then all of a sudden she had 3 periods in a month and that was the reason for the hysteroscopy and polypectomy. Pt wants to know now that the polyp is gone, what she should expect for her periods? Pt would like to discuss with CAP. Message to CAP to please advise.

## 2019-04-25 NOTE — PROGRESS NOTES
Gui Valdez is a 48year old female  Patient's last menstrual period was 2019. Patient presents with:  Post-Op  . Had hys/D&C/myosure polypectomy performed on 19. Doing well. Path is benign endometrial polyp.   Pt has stopped bleedi Not on file      Transportation needs:        Medical: Not on file        Non-medical: Not on file    Tobacco Use      Smoking status: Never Smoker      Smokeless tobacco: Never Used    Substance and Sexual Activity      Alcohol use: No        Alcohol/week Inhaler, Rfl: 2  •  Clobetasol Propionate 0.05 % External Cream, Apply 1 Application topically 2 (two) times daily. , Disp: 45 g, Rfl: 1  •  Ondansetron HCl (ZOFRAN) 4 mg tablet, Take 1 tablet (4 mg total) by mouth every 8 (eight) hours as needed for Nausea cyanosis  Psychiatric:  Oriented to time, place, person and situation.  Appropriate mood and affect    Pelvic Exam:   External Genitalia: normal appearance, hair distribution, and no lesions  Urethral Meatus:  normal in size, location, without lesions and p

## 2019-05-16 DIAGNOSIS — M54.9 BACK PAIN, UNSPECIFIED BACK LOCATION, UNSPECIFIED BACK PAIN LATERALITY, UNSPECIFIED CHRONICITY: ICD-10-CM

## 2019-05-16 RX ORDER — IBUPROFEN 800 MG/1
TABLET ORAL
Qty: 60 TABLET | Refills: 2 | Status: SHIPPED | OUTPATIENT
Start: 2019-05-16 | End: 2019-05-29 | Stop reason: SDUPTHER

## 2019-05-29 DIAGNOSIS — M54.9 BACK PAIN, UNSPECIFIED BACK LOCATION, UNSPECIFIED BACK PAIN LATERALITY, UNSPECIFIED CHRONICITY: ICD-10-CM

## 2019-05-29 RX ORDER — IBUPROFEN 800 MG/1
TABLET ORAL
Qty: 60 TABLET | Refills: 0 | Status: SHIPPED | OUTPATIENT
Start: 2019-05-29 | End: 2021-11-11 | Stop reason: SDUPTHER

## 2019-06-14 ENCOUNTER — OFFICE VISIT (OUTPATIENT)
Dept: PODIATRY CLINIC | Facility: CLINIC | Age: 54
End: 2019-06-14
Payer: COMMERCIAL

## 2019-06-14 VITALS — TEMPERATURE: 99 F | SYSTOLIC BLOOD PRESSURE: 125 MMHG | DIASTOLIC BLOOD PRESSURE: 82 MMHG | HEART RATE: 76 BPM

## 2019-06-14 DIAGNOSIS — L60.0 INGROWN TOENAIL OF RIGHT FOOT: Primary | ICD-10-CM

## 2019-06-14 PROCEDURE — 11750 EXCISION NAIL&NAIL MATRIX: CPT | Performed by: PODIATRIST

## 2019-06-14 NOTE — PROGRESS NOTES
HPI:    Patient ID: Marlys Arriola is a 48year old female. This 51-year-old female presents with continued pain associated with the right great toenail. She has had relief by previous procedure on the left.   She points to the medial border of the ri Allergies:  Amoxicillin             RASH  Bactrim [Sulfametho*    HIVES  Flagyl [Metronidazo*    HIVES  Flu Virus Vaccine           Comment:Respiratory Problems   PHYSICAL EXAM:     After I reviewed the procedure patient signed a written consent.   I lo

## 2019-06-28 ENCOUNTER — TELEPHONE (OUTPATIENT)
Dept: GASTROENTEROLOGY | Facility: CLINIC | Age: 54
End: 2019-06-28

## 2019-07-26 NOTE — TELEPHONE ENCOUNTER
Per pt request, she is requesting 90-day supply of linzess 145mcg to be sent to her pharmacy, she will be provided copay card d/t cost.     Please advise, thank you.

## 2019-09-03 ENCOUNTER — OFFICE VISIT (OUTPATIENT)
Dept: ORTHOPEDICS CLINIC | Facility: CLINIC | Age: 54
End: 2019-09-03
Payer: COMMERCIAL

## 2019-09-03 VITALS
HEIGHT: 69 IN | HEART RATE: 62 BPM | BODY MASS INDEX: 28.44 KG/M2 | DIASTOLIC BLOOD PRESSURE: 78 MMHG | RESPIRATION RATE: 24 BRPM | WEIGHT: 192 LBS | SYSTOLIC BLOOD PRESSURE: 127 MMHG

## 2019-09-03 DIAGNOSIS — M77.11 LATERAL EPICONDYLITIS OF RIGHT ELBOW: Primary | ICD-10-CM

## 2019-09-03 PROCEDURE — 20550 NJX 1 TENDON SHEATH/LIGAMENT: CPT | Performed by: ORTHOPAEDIC SURGERY

## 2019-09-03 PROCEDURE — 99204 OFFICE O/P NEW MOD 45 MIN: CPT | Performed by: ORTHOPAEDIC SURGERY

## 2019-09-03 RX ORDER — PREDNISONE 10 MG/1
TABLET ORAL
Refills: 6 | COMMUNITY
Start: 2019-06-28 | End: 2019-09-11 | Stop reason: ALTCHOICE

## 2019-09-03 RX ORDER — TRIAMCINOLONE ACETONIDE 40 MG/ML
40 INJECTION, SUSPENSION INTRA-ARTICULAR; INTRAMUSCULAR ONCE
Status: COMPLETED | OUTPATIENT
Start: 2019-09-03 | End: 2019-09-03

## 2019-09-03 RX ADMIN — TRIAMCINOLONE ACETONIDE 40 MG: 40 INJECTION, SUSPENSION INTRA-ARTICULAR; INTRAMUSCULAR at 17:14:00

## 2019-09-03 NOTE — PROGRESS NOTES
NURSING INTAKE COMMENTS: Patient presents with:  Consult: right elbow pain  -- no xrays taken. Onset about 1 year and has gotten worse over the past couple of days. 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 Rates pain 10/10.  Taking Motrin 800 mg NEEDLE BIOPSY RIGHT     • NEPHRECTOMY Right 1999   • NEPHRECTOMY     • OTHER  2015    UFE   • PRIOR MYOMECTOMY     • UPPER GI ENDOSCOPY,EXAM         Current Outpatient Medications:  linaCLOtide (LINZESS) 145 MCG Oral Cap Take 1 capsule by mouth daily.  Disp Hypertension Mother    • Cancer Sister         cervical   • Hypertension Sister    • Heart Disorder Sister    • Hypertension Brother    • Cancer Sister         cervical   • Hypertension Sister      No family Hx of DVT/PE    Social History    Occupational H Resisted distal third finger extension also did not cause any symptoms to the posterior interosseous nerve. Motor strength was intact but difficult to assess for full strength due to splinting from pain. Imaging: No results found.      Lab Results   Com

## 2019-09-03 NOTE — PROGRESS NOTES
Per Dr. Mandy Alvarez, draw up 1 cc of 0.5 % Marcaine and 1 cc of Kenalog 40 for injection to right elbow.  RR

## 2019-09-05 ENCOUNTER — HOSPITAL ENCOUNTER (EMERGENCY)
Facility: HOSPITAL | Age: 54
Discharge: HOME | End: 2019-09-05
Attending: EMERGENCY MEDICINE
Payer: OTHER MISCELLANEOUS

## 2019-09-05 ENCOUNTER — APPOINTMENT (OUTPATIENT)
Dept: OTHER | Facility: HOSPITAL | Age: 54
End: 2019-09-05
Attending: EMERGENCY MEDICINE

## 2019-09-05 VITALS
WEIGHT: 192 LBS | TEMPERATURE: 98 F | BODY MASS INDEX: 28.44 KG/M2 | SYSTOLIC BLOOD PRESSURE: 134 MMHG | RESPIRATION RATE: 20 BRPM | DIASTOLIC BLOOD PRESSURE: 92 MMHG | HEIGHT: 69 IN | OXYGEN SATURATION: 100 % | HEART RATE: 80 BPM

## 2019-09-05 DIAGNOSIS — T59.891A INHALATION OF CLEANING AGENT, ACCIDENTAL OR UNINTENTIONAL, INITIAL ENCOUNTER: Primary | ICD-10-CM

## 2019-09-05 DIAGNOSIS — J98.01 BRONCHOSPASM: ICD-10-CM

## 2019-09-05 PROCEDURE — 99284 EMERGENCY DEPT VISIT MOD MDM: CPT

## 2019-09-05 PROCEDURE — 94640 AIRWAY INHALATION TREATMENT: CPT

## 2019-09-05 RX ORDER — IPRATROPIUM BROMIDE AND ALBUTEROL SULFATE 2.5; .5 MG/3ML; MG/3ML
3 SOLUTION RESPIRATORY (INHALATION) ONCE
Status: COMPLETED | OUTPATIENT
Start: 2019-09-05 | End: 2019-09-05

## 2019-09-05 RX ORDER — ONDANSETRON 4 MG/1
TABLET, ORALLY DISINTEGRATING ORAL
Status: COMPLETED
Start: 2019-09-05 | End: 2019-09-05

## 2019-09-05 RX ORDER — ALBUTEROL SULFATE 90 UG/1
2 AEROSOL, METERED RESPIRATORY (INHALATION) EVERY 4 HOURS PRN
Qty: 1 INHALER | Refills: 0 | Status: SHIPPED | OUTPATIENT
Start: 2019-09-05 | End: 2019-10-05

## 2019-09-05 RX ORDER — DEXAMETHASONE SODIUM PHOSPHATE 4 MG/ML
8 INJECTION, SOLUTION INTRA-ARTICULAR; INTRALESIONAL; INTRAMUSCULAR; INTRAVENOUS; SOFT TISSUE ONCE
Status: COMPLETED | OUTPATIENT
Start: 2019-09-05 | End: 2019-09-05

## 2019-09-05 RX ORDER — PREDNISONE 20 MG/1
40 TABLET ORAL DAILY
Qty: 10 TABLET | Refills: 0 | Status: SHIPPED | OUTPATIENT
Start: 2019-09-05 | End: 2019-09-10

## 2019-09-05 RX ORDER — ALBUTEROL SULFATE 2.5 MG/3ML
2.5 SOLUTION RESPIRATORY (INHALATION) ONCE
Status: COMPLETED | OUTPATIENT
Start: 2019-09-05 | End: 2019-09-05

## 2019-09-05 RX ORDER — ONDANSETRON 4 MG/1
4 TABLET, ORALLY DISINTEGRATING ORAL ONCE
Status: COMPLETED | OUTPATIENT
Start: 2019-09-05 | End: 2019-09-05

## 2019-09-05 NOTE — ED INITIAL ASSESSMENT (HPI)
Exposed to a chemical cleaning product at work called pericept, now with cough and throat irritation.

## 2019-09-05 NOTE — ED NOTES
Pt to room vomiting. Dr. Rajesh Robledo at bedside. RT called for second neb for active wheeze. Monitoring ongoing.

## 2019-09-05 NOTE — ED NOTES
Patient with intact airway, c/o cough and wheezing s/p exposure to a chemical cleaning product on carpet. She arrives in no apparent distress, but moist cough noted and c/o throat irritation. Duoneb ordered.  Awaiting eval by MD.

## 2019-09-06 ENCOUNTER — APPOINTMENT (OUTPATIENT)
Dept: OTHER | Facility: HOSPITAL | Age: 54
End: 2019-09-06
Attending: EMERGENCY MEDICINE

## 2019-09-06 ENCOUNTER — HOSPITAL ENCOUNTER (EMERGENCY)
Facility: HOSPITAL | Age: 54
Discharge: HOME | End: 2019-09-06
Attending: PHYSICIAN ASSISTANT
Payer: OTHER MISCELLANEOUS

## 2019-09-06 ENCOUNTER — APPOINTMENT (OUTPATIENT)
Dept: CT IMAGING | Facility: HOSPITAL | Age: 54
End: 2019-09-06
Attending: PHYSICIAN ASSISTANT
Payer: OTHER MISCELLANEOUS

## 2019-09-06 VITALS
WEIGHT: 192 LBS | DIASTOLIC BLOOD PRESSURE: 92 MMHG | HEIGHT: 69 IN | OXYGEN SATURATION: 99 % | HEART RATE: 69 BPM | BODY MASS INDEX: 28.44 KG/M2 | SYSTOLIC BLOOD PRESSURE: 133 MMHG | RESPIRATION RATE: 18 BRPM | TEMPERATURE: 99 F

## 2019-09-06 DIAGNOSIS — Z77.098 EXPOSURE TO CHEMICAL INHALATION: ICD-10-CM

## 2019-09-06 DIAGNOSIS — R03.0 ELEVATED BLOOD PRESSURE READING: ICD-10-CM

## 2019-09-06 DIAGNOSIS — R51.9 ACUTE NONINTRACTABLE HEADACHE, UNSPECIFIED HEADACHE TYPE: Primary | ICD-10-CM

## 2019-09-06 PROCEDURE — 99284 EMERGENCY DEPT VISIT MOD MDM: CPT

## 2019-09-06 PROCEDURE — 70450 CT HEAD/BRAIN W/O DYE: CPT | Performed by: PHYSICIAN ASSISTANT

## 2019-09-06 RX ORDER — ACETAMINOPHEN 500 MG
1000 TABLET ORAL ONCE
Status: COMPLETED | OUTPATIENT
Start: 2019-09-06 | End: 2019-09-06

## 2019-09-06 NOTE — ED INITIAL ASSESSMENT (HPI)
Patient was exposed to cleaning chemical yesterday. Patient followed up today with occupational health and they sent her to the ED for further evaluation. Patient complaining of headache.

## 2019-09-06 NOTE — ED PROVIDER NOTES
Patient Seen in: [facility] Department    History   Patient presents with:  Exposure,Chem Occupational (infectious)    Stated Complaint: Chemical fume exposure    HPI    61-year-old female presents with chief complaint of bilateral front ENDOSCOPY,EXAM         Medications :   predniSONE 10 MG Oral Tab,  TAKE 3 TABLETS BY MOUTH EVERY DAY FOR 5 DAYS THEN 2 DAILY FOR 5 DAYS THEN 1 DAILY FOR 5 DAYS   topiramate 50 MG Oral Tab,  Take 1 tablet (50 mg total) by mouth 2 (two) times daily.    Albute Used    Alcohol use: No      Alcohol/week: 0.0 standard drinks    Drug use: Never      Review of Systems    Positive for stated complaint: Chemical fume exposure  Other systems are as noted in HPI. Constitutional and vital signs reviewed.       All other s deformity. Neurological: Cranial nerve II through XII intact. DTRs intact and symmetrical bilaterally. Bowel function is intact. Sensation intact to light touch. Strength and range of motion symmetrical of all extremities x4.  Patient exhibits normal speec months to obtain basic health screening including reassessment of your blood pressure.     Medications Prescribed:  Discharge Medication List as of 9/6/2019  6:29 PM

## 2019-09-09 ENCOUNTER — APPOINTMENT (OUTPATIENT)
Dept: OTHER | Facility: HOSPITAL | Age: 54
End: 2019-09-09
Attending: EMERGENCY MEDICINE

## 2019-09-11 ENCOUNTER — OFFICE VISIT (OUTPATIENT)
Dept: NEUROLOGY | Facility: CLINIC | Age: 54
End: 2019-09-11
Payer: COMMERCIAL

## 2019-09-11 ENCOUNTER — APPOINTMENT (OUTPATIENT)
Dept: OTHER | Facility: HOSPITAL | Age: 54
End: 2019-09-11
Attending: EMERGENCY MEDICINE

## 2019-09-11 VITALS
HEART RATE: 72 BPM | WEIGHT: 192 LBS | SYSTOLIC BLOOD PRESSURE: 112 MMHG | TEMPERATURE: 98 F | DIASTOLIC BLOOD PRESSURE: 70 MMHG | HEIGHT: 69 IN | BODY MASS INDEX: 28.44 KG/M2

## 2019-09-11 DIAGNOSIS — R51.9 INTERMITTENT HEADACHE: Primary | ICD-10-CM

## 2019-09-11 PROCEDURE — 99243 OFF/OP CNSLTJ NEW/EST LOW 30: CPT | Performed by: OTHER

## 2019-09-13 NOTE — PROGRESS NOTES
Neurology Outpatient Consult Note    Michael Oneill : 1965   Referring Physician: Dr. Mark Quevedo  HPI:     Michael Oneill is a 48year old female who is being seen in neurologic evaluation. Patient being seen in evaluation for headache.     On every 8 (eight) hours as needed. Disp: 30 tablet Rfl: 5   Doxepin HCl (ZONALON) 5 % External Cream Apply 1 Application topically 3 (three) times daily. For itchy spot Disp: 30 g Rfl: 2   furosemide 40 MG Oral Tab Take 40 mg by mouth as needed.    Disp:  Rfl Years of education: Not on file      Highest education level: Not on file    Tobacco Use      Smoking status: Never Smoker      Smokeless tobacco: Never Used    Substance and Sexual Activity      Alcohol use: No        Alcohol/week: 0.0 standard drinks improving. Work-up, and clinical improvement, reassuring.     –We did discuss lifestyle modifications to improve headache including hydration, sleep, minimizing exposure to stress, screens and loud sounds    –Follow-up in clinic if symptoms worsen/fail to

## 2019-09-27 RX ORDER — SPIRONOLACTONE 50 MG/1
TABLET, FILM COATED ORAL
Qty: 90 TABLET | Refills: 3 | Status: SHIPPED | OUTPATIENT
Start: 2019-09-27 | End: 2020-09-21

## 2019-11-11 ENCOUNTER — TELEPHONE (OUTPATIENT)
Dept: OBGYN CLINIC | Facility: CLINIC | Age: 54
End: 2019-11-11

## 2019-11-11 NOTE — TELEPHONE ENCOUNTER
Pt states that she has had BV in the pass and she thinks she has it again. She started with an odor two days ago. Denies any discharge or vaginal itching. Pt works here. Pt saw that CAP had an open appt today at 3:40 PM, 10 minute ob slot.  Pt woul

## 2019-11-11 NOTE — TELEPHONE ENCOUNTER
CAP stated that she can not see pt in today at that appt time. Offered a 20 minute appt and she stated she got a better time with Dr. Amanda Del Real.

## 2019-11-12 ENCOUNTER — OFFICE VISIT (OUTPATIENT)
Dept: OBGYN CLINIC | Facility: CLINIC | Age: 54
End: 2019-11-12
Payer: COMMERCIAL

## 2019-11-12 VITALS
DIASTOLIC BLOOD PRESSURE: 80 MMHG | BODY MASS INDEX: 28 KG/M2 | WEIGHT: 192 LBS | HEART RATE: 83 BPM | SYSTOLIC BLOOD PRESSURE: 115 MMHG

## 2019-11-12 DIAGNOSIS — N89.8 DISCHARGE FROM THE VAGINA: Primary | ICD-10-CM

## 2019-11-12 PROCEDURE — 99213 OFFICE O/P EST LOW 20 MIN: CPT | Performed by: OBSTETRICS & GYNECOLOGY

## 2019-11-12 NOTE — PROGRESS NOTES
Zora GUERRERO 1965       Patient presents with:  Gyn Exam: Pt c/o of some vaginal odor since about . Denies any itchyness. pt has strong family h/o heart disease.   Her 54year old sister has just recently become ill and she has been lungs every 4 (four) hours as needed for Wheezing., Disp: 1 Inhaler, Rfl: 2  Ondansetron HCl (ZOFRAN) 4 mg tablet, Take 1 tablet (4 mg total) by mouth every 8 (eight) hours as needed for Nausea., Disp: 20 tablet, Rfl: 2  ibuprofen 800 MG Oral Tab, Take 1 t

## 2019-11-14 ENCOUNTER — OFFICE VISIT (OUTPATIENT)
Dept: INTERNAL MEDICINE CLINIC | Facility: CLINIC | Age: 54
End: 2019-11-14
Payer: COMMERCIAL

## 2019-11-14 VITALS
HEIGHT: 65 IN | BODY MASS INDEX: 31.99 KG/M2 | DIASTOLIC BLOOD PRESSURE: 76 MMHG | SYSTOLIC BLOOD PRESSURE: 109 MMHG | HEART RATE: 80 BPM | WEIGHT: 192 LBS

## 2019-11-14 DIAGNOSIS — R06.00 DOE (DYSPNEA ON EXERTION): ICD-10-CM

## 2019-11-14 DIAGNOSIS — Z12.31 BREAST CANCER SCREENING BY MAMMOGRAM: ICD-10-CM

## 2019-11-14 DIAGNOSIS — I10 ESSENTIAL HYPERTENSION: Primary | ICD-10-CM

## 2019-11-14 DIAGNOSIS — R60.0 LOWER EXTREMITY EDEMA: ICD-10-CM

## 2019-11-14 PROBLEM — R06.09 DOE (DYSPNEA ON EXERTION): Status: ACTIVE | Noted: 2019-11-14

## 2019-11-14 PROCEDURE — 99214 OFFICE O/P EST MOD 30 MIN: CPT | Performed by: INTERNAL MEDICINE

## 2019-11-14 NOTE — ASSESSMENT & PLAN NOTE
Patient has some dyspnea on exertion.   This is likely due to her asthma, however in light of her family history we will check an echocardiogram.

## 2019-11-14 NOTE — PATIENT INSTRUCTIONS
Wait 4 business days, then call [phone] to schedule the echocardiogram.  Ask if this has been authorized. If not please call [facility] at [phone].

## 2019-11-14 NOTE — ASSESSMENT & PLAN NOTE
Patient has intermittent lower extremity edema and a very strong family history of congestive heart failure.   I will check an echocardiogram.

## 2019-11-14 NOTE — PROGRESS NOTES
HPI:    Patient ID: Dee Dee Castellano is a 48year old female. Pt saw Dr. Selvin Denton. Her sister had CHF with low EF in her late 35s and  at age 48. Her other sister age 54 had A.fib.and CHF. Her niece in her 19's had CHF with low EF.   Pt does not hav • UPPER GI ENDOSCOPY,EXAM            Current Outpatient Medications   Medication Sig Dispense Refill   • topiramate 50 MG Oral Tab Take 1 tablet (50 mg total) by mouth 2 (two) times daily.  180 tablet 1   • spironolactone 50 MG Oral Tab Take 1 tablet by m (1.651 m)   Wt 192 lb (87.1 kg)   Breastfeeding No   BMI 31.95 kg/m²   PHYSICAL EXAM:   Physical Exam   Nursing note and vitals reviewed. Constitutional: She is oriented to person, place, and time. She appears well-developed and well-nourished.    HENT:

## 2019-11-21 ENCOUNTER — TELEPHONE (OUTPATIENT)
Dept: OBGYN CLINIC | Facility: CLINIC | Age: 54
End: 2019-11-21

## 2019-11-21 NOTE — TELEPHONE ENCOUNTER
STATES SHE USED THE SmartCrowdz AND IT DID NOT HELP AT ALL.   IS ASKING IF YOU WILL PLEASE REFILL CLINDAMYCIN (GOT FROM A PREVIOUS DR) Marjorie Aguilera THAT WORKS WITHIN 2 DAYS OF TAKING IT.

## 2019-11-21 NOTE — TELEPHONE ENCOUNTER
Notified pt of message below. Pt states she does not know what else to do. She has tried Rephresh with no improvement. States she knows this is BV because she has an abnormal vaginal odor. Pt will come in again for another culture.  Informed CAP does not ha

## 2019-11-21 NOTE — TELEPHONE ENCOUNTER
Pt requesting to speak with nurse, states she is still having vaginal odor, pt states she tried OTC Cap suggested, and nothing has helped, or worked

## 2019-11-21 NOTE — TELEPHONE ENCOUNTER
I recommended the rephresh because the culture were negative for BV.   I can reculture her if she thinks that there is more discharge, otherwise I would only rx an antibiotic for + cultures

## 2019-11-22 ENCOUNTER — OFFICE VISIT (OUTPATIENT)
Dept: OBGYN CLINIC | Facility: CLINIC | Age: 54
End: 2019-11-22
Payer: COMMERCIAL

## 2019-11-22 VITALS — BODY MASS INDEX: 32 KG/M2 | SYSTOLIC BLOOD PRESSURE: 110 MMHG | DIASTOLIC BLOOD PRESSURE: 70 MMHG | WEIGHT: 192 LBS

## 2019-11-22 DIAGNOSIS — N89.8 VAGINAL ODOR: Primary | ICD-10-CM

## 2019-11-22 DIAGNOSIS — B96.89 BV (BACTERIAL VAGINOSIS): ICD-10-CM

## 2019-11-22 DIAGNOSIS — N76.0 BV (BACTERIAL VAGINOSIS): ICD-10-CM

## 2019-11-22 PROCEDURE — 99213 OFFICE O/P EST LOW 20 MIN: CPT | Performed by: OBSTETRICS & GYNECOLOGY

## 2019-11-22 RX ORDER — LINACLOTIDE 145 UG/1
1 CAPSULE, GELATIN COATED ORAL
Refills: 2 | COMMUNITY
Start: 2019-10-24 | End: 2020-01-20

## 2019-11-22 RX ORDER — CLINDAMYCIN PHOSPHATE 20 MG/G
1 CREAM VAGINAL NIGHTLY
Qty: 40 G | Refills: 0 | Status: SHIPPED | OUTPATIENT
Start: 2019-11-22 | End: 2020-02-04

## 2019-11-22 NOTE — PROGRESS NOTES
HPI:    Patient ID: Monica Sims is a 48year old female. Patient reports vagina odor for a few days. Has a H/O recurrent BV. Patient could not get in to see regular Gyne. Patient states Clindamycin Vaginal Cream works very well for her.   Discu Virus Vaccine           Comment:Respiratory Problems   PHYSICAL EXAM:   Physical Exam   Constitutional: She is oriented to person, place, and time. She appears well-developed and well-nourished. Abdominal: Soft.    Genitourinary:    Uterus normal.      Ge

## 2019-11-22 NOTE — TELEPHONE ENCOUNTER
Informed pt of CAP recs below. Assisted pt with scheduling appt with MAF for 11/25/19 at 1020am due to CAP having no openings. Location information provided to pt. Pt verbalized understanding.

## 2019-11-25 ENCOUNTER — APPOINTMENT (OUTPATIENT)
Dept: LAB | Facility: HOSPITAL | Age: 54
End: 2019-11-25
Attending: INTERNAL MEDICINE
Payer: COMMERCIAL

## 2019-11-25 DIAGNOSIS — E66.9 OBESITY (BMI 30-39.9): ICD-10-CM

## 2019-11-25 DIAGNOSIS — E53.8 VITAMIN B12 DEFICIENCY: ICD-10-CM

## 2019-11-25 DIAGNOSIS — Z51.81 ENCOUNTER FOR THERAPEUTIC DRUG MONITORING: ICD-10-CM

## 2019-11-25 DIAGNOSIS — I10 ESSENTIAL HYPERTENSION: ICD-10-CM

## 2019-11-25 PROCEDURE — 93005 ELECTROCARDIOGRAM TRACING: CPT

## 2019-11-25 PROCEDURE — 93010 ELECTROCARDIOGRAM REPORT: CPT | Performed by: INTERNAL MEDICINE

## 2019-11-26 ENCOUNTER — HOSPITAL ENCOUNTER (OUTPATIENT)
Dept: CV DIAGNOSTICS | Facility: HOSPITAL | Age: 54
Discharge: HOME | End: 2019-11-26
Attending: INTERNAL MEDICINE
Payer: COMMERCIAL

## 2019-11-26 DIAGNOSIS — R06.00 DOE (DYSPNEA ON EXERTION): ICD-10-CM

## 2019-11-26 DIAGNOSIS — R60.0 LOWER EXTREMITY EDEMA: ICD-10-CM

## 2019-11-26 PROCEDURE — 93306 TTE W/DOPPLER COMPLETE: CPT | Performed by: INTERNAL MEDICINE

## 2019-12-11 RX ORDER — TOPIRAMATE 50 MG/1
TABLET, FILM COATED ORAL
Qty: 180 TABLET | Refills: 1 | OUTPATIENT
Start: 2019-12-11

## 2020-01-06 ENCOUNTER — HOSPITAL ENCOUNTER (OUTPATIENT)
Age: 55
Discharge: HOME | End: 2020-01-06
Attending: EMERGENCY MEDICINE
Payer: COMMERCIAL

## 2020-01-06 VITALS
HEIGHT: 69 IN | OXYGEN SATURATION: 99 % | RESPIRATION RATE: 24 BRPM | HEART RATE: 110 BPM | BODY MASS INDEX: 28.58 KG/M2 | WEIGHT: 193 LBS | DIASTOLIC BLOOD PRESSURE: 86 MMHG | TEMPERATURE: 100 F | SYSTOLIC BLOOD PRESSURE: 137 MMHG

## 2020-01-06 DIAGNOSIS — J06.9 UPPER RESPIRATORY TRACT INFECTION, UNSPECIFIED TYPE: Primary | ICD-10-CM

## 2020-01-06 LAB
POCT INFLUENZA A: NEGATIVE
POCT INFLUENZA B: NEGATIVE

## 2020-01-06 PROCEDURE — 99214 OFFICE O/P EST MOD 30 MIN: CPT

## 2020-01-06 PROCEDURE — 94640 AIRWAY INHALATION TREATMENT: CPT

## 2020-01-06 PROCEDURE — 87502 INFLUENZA DNA AMP PROBE: CPT | Performed by: EMERGENCY MEDICINE

## 2020-01-06 RX ORDER — IPRATROPIUM BROMIDE AND ALBUTEROL SULFATE 2.5; .5 MG/3ML; MG/3ML
3 SOLUTION RESPIRATORY (INHALATION) ONCE
Status: COMPLETED | OUTPATIENT
Start: 2020-01-06 | End: 2020-01-06

## 2020-01-06 RX ORDER — PREDNISONE 20 MG/1
40 TABLET ORAL DAILY
Qty: 10 TABLET | Refills: 0 | Status: SHIPPED | OUTPATIENT
Start: 2020-01-06 | End: 2020-01-11

## 2020-01-06 NOTE — ED INITIAL ASSESSMENT (HPI)
Patient states having productive cough, low grade fever, SOB, body aches, chest pain. Patient states taking Tylenol at 0500 this morning. Patient states she has not received her flu shot this year, states she is allergic to it.

## 2020-01-06 NOTE — ED PROVIDER NOTES
Patient Seen in: [address]      History   Patient presents with:  Cough/URI  Fever    Stated Complaint: cough    HPI    65-year-old female presents for evaluation of cough.   Patient reports since yesterday she has had r All other systems reviewed and negative except as noted above.     Physical Exam     ED Triage Vitals   BP 01/06/20 0842 137/86   Pulse 01/06/20 0842 110   Resp 01/06/20 0842 24   Temp 01/06/20 0842 100.3 °F (37.9 °C)   Temp src 01/06/20 0842 Oral   SpO plan.              Disposition and Plan     Clinical Impression:  Upper respiratory tract infection, unspecified type  (primary encounter diagnosis)    Disposition:  Discharge  1/6/2020  9:09 am    Follow-up:  Raymond Pratt MD  [address] [place] [address]

## 2020-01-09 ENCOUNTER — HOSPITAL ENCOUNTER (OUTPATIENT)
Dept: GENERAL RADIOLOGY | Facility: HOSPITAL | Age: 55
Discharge: HOME | End: 2020-01-09
Attending: NURSE PRACTITIONER
Payer: COMMERCIAL

## 2020-01-09 ENCOUNTER — TELEPHONE (OUTPATIENT)
Dept: INTERNAL MEDICINE CLINIC | Facility: CLINIC | Age: 55
End: 2020-01-09

## 2020-01-09 ENCOUNTER — OFFICE VISIT (OUTPATIENT)
Dept: INTERNAL MEDICINE CLINIC | Facility: CLINIC | Age: 55
End: 2020-01-09
Payer: COMMERCIAL

## 2020-01-09 VITALS
HEIGHT: 69 IN | WEIGHT: 193 LBS | BODY MASS INDEX: 28.58 KG/M2 | OXYGEN SATURATION: 98 % | SYSTOLIC BLOOD PRESSURE: 148 MMHG | TEMPERATURE: 98 F | DIASTOLIC BLOOD PRESSURE: 87 MMHG | HEART RATE: 79 BPM

## 2020-01-09 DIAGNOSIS — J45.41 MODERATE PERSISTENT ASTHMA WITH ACUTE EXACERBATION: ICD-10-CM

## 2020-01-09 DIAGNOSIS — R06.2 WHEEZING: ICD-10-CM

## 2020-01-09 DIAGNOSIS — R05.9 COUGH: ICD-10-CM

## 2020-01-09 DIAGNOSIS — R06.2 WHEEZING: Primary | ICD-10-CM

## 2020-01-09 PROBLEM — J45.901 MODERATE ASTHMA WITH ACUTE EXACERBATION (HCC): Status: ACTIVE | Noted: 2020-01-09

## 2020-01-09 PROBLEM — J45.901 MODERATE ASTHMA WITH ACUTE EXACERBATION: Status: ACTIVE | Noted: 2020-01-09

## 2020-01-09 PROCEDURE — 71046 X-RAY EXAM CHEST 2 VIEWS: CPT | Performed by: NURSE PRACTITIONER

## 2020-01-09 PROCEDURE — 99213 OFFICE O/P EST LOW 20 MIN: CPT | Performed by: NURSE PRACTITIONER

## 2020-01-09 PROCEDURE — 99070 SPECIAL SUPPLIES PHYS/QHP: CPT | Performed by: NURSE PRACTITIONER

## 2020-01-09 RX ORDER — PREDNISONE 10 MG/1
TABLET ORAL
Qty: 30 TABLET | Refills: 0 | Status: SHIPPED | OUTPATIENT
Start: 2020-01-09 | End: 2020-02-04 | Stop reason: ALTCHOICE

## 2020-01-09 RX ORDER — AZITHROMYCIN 250 MG/1
TABLET, FILM COATED ORAL
Qty: 6 TABLET | Refills: 0 | Status: SHIPPED | OUTPATIENT
Start: 2020-01-09 | End: 2020-02-04 | Stop reason: ALTCHOICE

## 2020-01-09 RX ORDER — IPRATROPIUM BROMIDE AND ALBUTEROL SULFATE 2.5; .5 MG/3ML; MG/3ML
3 SOLUTION RESPIRATORY (INHALATION) ONCE
Status: DISCONTINUED | OUTPATIENT
Start: 2020-01-09 | End: 2020-02-04

## 2020-01-09 RX ORDER — IPRATROPIUM BROMIDE AND ALBUTEROL SULFATE 2.5; .5 MG/3ML; MG/3ML
3 SOLUTION RESPIRATORY (INHALATION) ONCE
Status: DISCONTINUED | OUTPATIENT
Start: 2020-01-09 | End: 2020-01-09

## 2020-01-09 RX ORDER — FLUTICASONE PROPIONATE AND SALMETEROL 232; 14 UG/1; UG/1
1 POWDER, METERED RESPIRATORY (INHALATION) 2 TIMES DAILY
Qty: 1 EACH | Refills: 5 | Status: SHIPPED | OUTPATIENT
Start: 2020-01-09 | End: 2020-02-04

## 2020-01-09 RX ORDER — BENZONATATE 100 MG/1
100 CAPSULE ORAL 3 TIMES DAILY PRN
Qty: 20 CAPSULE | Refills: 0 | Status: SHIPPED | OUTPATIENT
Start: 2020-01-09 | End: 2020-01-16

## 2020-01-09 NOTE — TELEPHONE ENCOUNTER
Pt stated that she was seen @ [facility] on Monday 1/6/2020. Pt is still having difficulty with her breathing. Stated she is still Wheezing and coughing. Pit was given Prednisone. Please advise.

## 2020-01-09 NOTE — TELEPHONE ENCOUNTER
Spoke with patient--reports she was seen in  1/06/2020--negative for flu. Was given Prednisone and is using inhaler with little relief. Patient currently at work at [employer] at this time.      Appt made for today at 2:30 p.m. with RANJANA

## 2020-01-09 NOTE — PROGRESS NOTES
HPI:    Patient ID: Lucrecia Gonzales is a 48year old female. 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 She went to urgent care on 1-6-2020. with similar symptoms.  They gave d mouth 3 (three) times daily as needed for cough. 20 capsule 0   • predniSONE 20 MG Oral Tab Take 2 tablets (40 mg total) by mouth daily for 5 days. 10 tablet 0   • LINZESS 145 MCG Oral Cap Take 1 capsule by mouth once daily.   2   • Clindamycin Phosphate 2 reveals no gallop and no friction rub. No murmur heard. Pulmonary/Chest: She is in respiratory distress. She has wheezes. She has no rales. Patient with constant intermittent cough   Abdominal: Soft. Bowel sounds are normal. She exhibits no mass.  The 10 MG Oral Tab    Fluticasone-Salmeterol 232-14 MCG/ACT Inhalation Aerosol Powder, Breath Activated      Other Visit Diagnoses     Wheezing    -  Primary    Relevant Medications    ipratropium-albuterol (DUONEB) nebulizer solution 3 mL    Other Relevant Or

## 2020-01-10 ENCOUNTER — TELEPHONE (OUTPATIENT)
Dept: INTERNAL MEDICINE CLINIC | Facility: CLINIC | Age: 55
End: 2020-01-10

## 2020-01-10 NOTE — TELEPHONE ENCOUNTER
Juan Francisco Givens    Patient called with chest x-ray. No pneumonia on x-ray. Patient instructed if she has increased SOB she should go to the ED.     Christina Vergara, ANP

## 2020-01-10 NOTE — TELEPHONE ENCOUNTER
Scott Kimball    Patient called for follow up on patient condition. She was able to get some sleep last night. She continues to cough. Results of chest x-ray showed no pnemonia. Patient to stop ZPAK. She is to continue Prednisone taper.   Continu

## 2020-01-10 NOTE — ASSESSMENT & PLAN NOTE
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 She appears ill On 1-6-2020 she went to the  center where she was given duonebs, prednisone 40mg daily for five days.  She is SOB and we d

## 2020-01-20 ENCOUNTER — TELEPHONE (OUTPATIENT)
Dept: GASTROENTEROLOGY | Facility: CLINIC | Age: 55
End: 2020-01-20

## 2020-01-20 NOTE — TELEPHONE ENCOUNTER
ROUTED TO OFFICE ON CALL    As of 1/1/2020, [facility] has removed Linzess from their formulary and the preferred medication is now Trulance. Please note the copay is the same for 30 or 90 day supply. Please review and sign pended order, thank you.

## 2020-01-20 NOTE — TELEPHONE ENCOUNTER
Signed orders for trulance.  Please let her know may require PA    Thanks    Janet Yeager MD  [facility], [facility] - Gastroenterology  1/20/2020  3:03 PM

## 2020-01-20 NOTE — TELEPHONE ENCOUNTER
[facility] states patient has been taking Linzess - new insurance does not cover. Requesting new rx for Trulance. Please call. Thank you.

## 2020-01-20 NOTE — TELEPHONE ENCOUNTER
Spoke to [place] at [address] [place] [address] ([phone]) and states the Trulance went through insurance for $25 for qty of 90 tabs. They will fill and notify pt once ready for . Pt contacted and informed. No further questions at this time.

## 2020-02-04 ENCOUNTER — OFFICE VISIT (OUTPATIENT)
Dept: INTERNAL MEDICINE CLINIC | Facility: CLINIC | Age: 55
End: 2020-02-04
Payer: COMMERCIAL

## 2020-02-04 VITALS
RESPIRATION RATE: 18 BRPM | DIASTOLIC BLOOD PRESSURE: 76 MMHG | BODY MASS INDEX: 34.2 KG/M2 | WEIGHT: 193 LBS | HEART RATE: 85 BPM | HEIGHT: 63 IN | SYSTOLIC BLOOD PRESSURE: 114 MMHG | TEMPERATURE: 99 F

## 2020-02-04 DIAGNOSIS — I10 ESSENTIAL HYPERTENSION: ICD-10-CM

## 2020-02-04 DIAGNOSIS — J45.20 MILD INTERMITTENT ASTHMA WITHOUT COMPLICATION: ICD-10-CM

## 2020-02-04 DIAGNOSIS — Z23 NEED FOR VACCINATION: ICD-10-CM

## 2020-02-04 DIAGNOSIS — K58.1 IRRITABLE BOWEL SYNDROME WITH CONSTIPATION: ICD-10-CM

## 2020-02-04 DIAGNOSIS — Z00.00 ROUTINE HEALTH MAINTENANCE: Primary | ICD-10-CM

## 2020-02-04 PROBLEM — N92.1 MENORRHAGIA WITH IRREGULAR CYCLE: Status: RESOLVED | Noted: 2019-03-26 | Resolved: 2020-02-04

## 2020-02-04 PROBLEM — Z51.81 ENCOUNTER FOR THERAPEUTIC DRUG MONITORING: Status: RESOLVED | Noted: 2018-03-09 | Resolved: 2020-02-04

## 2020-02-04 PROBLEM — J45.909 MODERATE ASTHMA WITHOUT COMPLICATION (HCC): Status: ACTIVE | Noted: 2020-01-09

## 2020-02-04 PROBLEM — D64.9 ANEMIA: Status: RESOLVED | Noted: 2018-01-25 | Resolved: 2020-02-04

## 2020-02-04 PROBLEM — J45.909 MODERATE ASTHMA WITHOUT COMPLICATION: Status: ACTIVE | Noted: 2020-01-09

## 2020-02-04 PROBLEM — E66.3 OVERWEIGHT (BMI 25.0-29.9): Status: RESOLVED | Noted: 2019-02-12 | Resolved: 2020-02-04

## 2020-02-04 PROBLEM — R06.09 DOE (DYSPNEA ON EXERTION): Status: RESOLVED | Noted: 2019-11-14 | Resolved: 2020-02-04

## 2020-02-04 PROBLEM — R10.30 LOWER ABDOMINAL PAIN: Status: RESOLVED | Noted: 2019-03-25 | Resolved: 2020-02-04

## 2020-02-04 PROBLEM — R06.00 DOE (DYSPNEA ON EXERTION): Status: RESOLVED | Noted: 2019-11-14 | Resolved: 2020-02-04

## 2020-02-04 PROCEDURE — 99396 PREV VISIT EST AGE 40-64: CPT | Performed by: INTERNAL MEDICINE

## 2020-02-04 PROCEDURE — 90732 PPSV23 VACC 2 YRS+ SUBQ/IM: CPT | Performed by: INTERNAL MEDICINE

## 2020-02-04 PROCEDURE — 90471 IMMUNIZATION ADMIN: CPT | Performed by: INTERNAL MEDICINE

## 2020-02-04 NOTE — PROGRESS NOTES
HPI:    Patient ID: Hossein Urias is a 48year old female. Pt is here for a physical.    She is recovering from a virus. Review of Systems   Constitutional: Negative for chills, diaphoresis and fever.    HENT: Negative for congestion, ear pain, hours as needed for Wheezing. (Patient not taking: Reported on 2/4/2020 ) 1 Inhaler 2   • furosemide 40 MG Oral Tab Take 40 mg by mouth as needed.            Allergies:  Amoxicillin             RASH  Bactrim [Sulfametho*    HIVES  Flagyl [Metronidazo*    HI tenderness. Left breast exhibits no inverted nipple, no mass, no nipple discharge, no skin change and no tenderness. Abdominal: Soft. Bowel sounds are normal. She exhibits no mass. There is no tenderness. Musculoskeletal: Normal range of motion.    Lymp

## 2020-02-05 ENCOUNTER — LAB ENCOUNTER (OUTPATIENT)
Dept: LAB | Facility: HOSPITAL | Age: 55
End: 2020-02-05
Attending: INTERNAL MEDICINE
Payer: COMMERCIAL

## 2020-02-05 DIAGNOSIS — Z00.00 ROUTINE HEALTH MAINTENANCE: ICD-10-CM

## 2020-02-05 LAB
ALBUMIN SERPL-MCNC: 3.4 G/DL (ref 3.4–5)
ALBUMIN/GLOB SERPL: 0.9 {RATIO} (ref 1–2)
ALP LIVER SERPL-CCNC: 89 U/L (ref 41–108)
ALT SERPL-CCNC: 16 U/L (ref 13–56)
ANION GAP SERPL CALC-SCNC: 4 MMOL/L (ref 0–18)
AST SERPL-CCNC: 15 U/L (ref 15–37)
BASOPHILS # BLD AUTO: 0.06 X10(3) UL (ref 0–0.2)
BASOPHILS NFR BLD AUTO: 0.7 %
BILIRUB SERPL-MCNC: 0.4 MG/DL (ref 0.1–2)
BUN BLD-MCNC: 21 MG/DL (ref 7–18)
BUN/CREAT SERPL: 16 (ref 10–20)
CALCIUM BLD-MCNC: 9 MG/DL (ref 8.5–10.1)
CHLORIDE SERPL-SCNC: 113 MMOL/L (ref 98–112)
CHOLEST SMN-MCNC: 160 MG/DL (ref ?–200)
CO2 SERPL-SCNC: 28 MMOL/L (ref 21–32)
CREAT BLD-MCNC: 1.31 MG/DL (ref 0.55–1.02)
DEPRECATED RDW RBC AUTO: 43.3 FL (ref 35.1–46.3)
EOSINOPHIL # BLD AUTO: 0.25 X10(3) UL (ref 0–0.7)
EOSINOPHIL NFR BLD AUTO: 2.7 %
ERYTHROCYTE [DISTWIDTH] IN BLOOD BY AUTOMATED COUNT: 13.4 % (ref 11–15)
GLOBULIN PLAS-MCNC: 3.9 G/DL (ref 2.8–4.4)
GLUCOSE BLD-MCNC: 89 MG/DL (ref 70–99)
HCT VFR BLD AUTO: 41.6 % (ref 35–48)
HDLC SERPL-MCNC: 55 MG/DL (ref 40–59)
HGB BLD-MCNC: 13.1 G/DL (ref 12–16)
IMM GRANULOCYTES # BLD AUTO: 0.07 X10(3) UL (ref 0–1)
IMM GRANULOCYTES NFR BLD: 0.8 %
LDLC SERPL CALC-MCNC: 91 MG/DL (ref ?–100)
LYMPHOCYTES # BLD AUTO: 2.55 X10(3) UL (ref 1–4)
LYMPHOCYTES NFR BLD AUTO: 27.9 %
M PROTEIN MFR SERPL ELPH: 7.3 G/DL (ref 6.4–8.2)
MCH RBC QN AUTO: 27.6 PG (ref 26–34)
MCHC RBC AUTO-ENTMCNC: 31.5 G/DL (ref 31–37)
MCV RBC AUTO: 87.8 FL (ref 80–100)
MONOCYTES # BLD AUTO: 0.62 X10(3) UL (ref 0.1–1)
MONOCYTES NFR BLD AUTO: 6.8 %
NEUTROPHILS # BLD AUTO: 5.59 X10 (3) UL (ref 1.5–7.7)
NEUTROPHILS # BLD AUTO: 5.59 X10(3) UL (ref 1.5–7.7)
NEUTROPHILS NFR BLD AUTO: 61.1 %
NONHDLC SERPL-MCNC: 105 MG/DL (ref ?–130)
OSMOLALITY SERPL CALC.SUM OF ELEC: 302 MOSM/KG (ref 275–295)
PATIENT FASTING Y/N/NP: YES
PATIENT FASTING Y/N/NP: YES
PLATELET # BLD AUTO: 252 10(3)UL (ref 150–450)
POTASSIUM SERPL-SCNC: 4 MMOL/L (ref 3.5–5.1)
RBC # BLD AUTO: 4.74 X10(6)UL (ref 3.8–5.3)
SODIUM SERPL-SCNC: 145 MMOL/L (ref 136–145)
TRIGL SERPL-MCNC: 69 MG/DL (ref 30–149)
TSI SER-ACNC: 3.67 MIU/ML (ref 0.36–3.74)
VIT B12 SERPL-MCNC: 512 PG/ML (ref 193–986)
VLDLC SERPL CALC-MCNC: 14 MG/DL (ref 0–30)
WBC # BLD AUTO: 9.1 X10(3) UL (ref 4–11)

## 2020-02-05 PROCEDURE — 36415 COLL VENOUS BLD VENIPUNCTURE: CPT

## 2020-02-05 PROCEDURE — 80053 COMPREHEN METABOLIC PANEL: CPT

## 2020-02-05 PROCEDURE — 85025 COMPLETE CBC W/AUTO DIFF WBC: CPT

## 2020-02-05 PROCEDURE — 82607 VITAMIN B-12: CPT

## 2020-02-05 PROCEDURE — 84443 ASSAY THYROID STIM HORMONE: CPT

## 2020-02-05 PROCEDURE — 80061 LIPID PANEL: CPT

## 2020-02-13 ENCOUNTER — HOSPITAL ENCOUNTER (OUTPATIENT)
Dept: MAMMOGRAPHY | Facility: HOSPITAL | Age: 55
Discharge: HOME | End: 2020-02-13
Attending: INTERNAL MEDICINE
Payer: COMMERCIAL

## 2020-02-13 DIAGNOSIS — Z12.31 BREAST CANCER SCREENING BY MAMMOGRAM: ICD-10-CM

## 2020-02-13 PROCEDURE — 77067 SCR MAMMO BI INCL CAD: CPT | Performed by: INTERNAL MEDICINE

## 2020-02-13 PROCEDURE — 77063 BREAST TOMOSYNTHESIS BI: CPT | Performed by: INTERNAL MEDICINE

## 2020-03-26 RX ORDER — PREDNISONE 10 MG/1
TABLET ORAL
Qty: 30 TABLET | Refills: 0 | OUTPATIENT
Start: 2020-03-26

## 2020-03-31 ENCOUNTER — HOSPITAL ENCOUNTER (OUTPATIENT)
Dept: CT IMAGING | Facility: HOSPITAL | Age: 55
Discharge: HOME | End: 2020-03-31
Attending: INTERNAL MEDICINE
Payer: COMMERCIAL

## 2020-03-31 ENCOUNTER — OFFICE VISIT (OUTPATIENT)
Dept: INTERNAL MEDICINE CLINIC | Facility: CLINIC | Age: 55
End: 2020-03-31
Payer: COMMERCIAL

## 2020-03-31 VITALS
HEIGHT: 63 IN | RESPIRATION RATE: 18 BRPM | HEART RATE: 76 BPM | DIASTOLIC BLOOD PRESSURE: 79 MMHG | SYSTOLIC BLOOD PRESSURE: 122 MMHG | BODY MASS INDEX: 34 KG/M2 | TEMPERATURE: 99 F

## 2020-03-31 DIAGNOSIS — S06.0X0D CONCUSSION WITHOUT LOSS OF CONSCIOUSNESS, SUBSEQUENT ENCOUNTER: Primary | ICD-10-CM

## 2020-03-31 DIAGNOSIS — S06.0X0D CONCUSSION WITHOUT LOSS OF CONSCIOUSNESS, SUBSEQUENT ENCOUNTER: ICD-10-CM

## 2020-03-31 PROBLEM — S06.0X0A CONCUSSION WITHOUT LOSS OF CONSCIOUSNESS: Status: ACTIVE | Noted: 2020-03-31

## 2020-03-31 PROCEDURE — 70450 CT HEAD/BRAIN W/O DYE: CPT | Performed by: INTERNAL MEDICINE

## 2020-03-31 PROCEDURE — 99214 OFFICE O/P EST MOD 30 MIN: CPT | Performed by: INTERNAL MEDICINE

## 2020-03-31 RX ORDER — FLUTICASONE PROPIONATE 50 MCG
SPRAY, SUSPENSION (ML) NASAL
Qty: 1 BOTTLE | Refills: 3 | Status: SHIPPED | OUTPATIENT
Start: 2020-03-31 | End: 2020-07-01

## 2020-03-31 NOTE — ASSESSMENT & PLAN NOTE
Gradually worsening headache extending from the right temporoparietal area into the frontal head and across the bridge of the nose. Rates the pain as 8 out of 10. Some dizziness–lightheadedness. Photophobia with difficulty in focusing her eyes.   Fatigue

## 2020-03-31 NOTE — PATIENT INSTRUCTIONS
Problem List Items Addressed This Visit        Unprioritized    Concussion without loss of consciousness - Primary     Gradually worsening headache extending from the right temporoparietal area into the frontal head and across the bridge of the nose.   Rate

## 2020-03-31 NOTE — PROGRESS NOTES
HPI:    Patient ID: Mckenna Rater is a 48year old female. Headache    This is a new problem. The current episode started in the past 7 days. The problem occurs constantly.  The problem has been gradually worsening (Had a head injury–hit her head re Psychiatric/Behavioral: Positive for decreased concentration and sleep disturbance. Negative for behavioral problems and confusion. The patient has insomnia.              Current Outpatient Medications   Medication Sig Dispense Refill   • Fluticasone Prop no distension and no mass. There is no tenderness. There is no rebound. Musculoskeletal: Normal range of motion. General: No edema. Lymphadenopathy:     She has no cervical adenopathy.    Neurological: She is alert and oriented to person, place, treatment recommendations after the testing is obtained. Addendum    CT scan discussed–no intracranial hemorrhage. Advised to take Tylenol 650 mg 1 tablet 4 times daily.   Call if symptoms of headache do not improve in the next day or 2, may need a smal

## 2020-04-02 ENCOUNTER — TELEPHONE (OUTPATIENT)
Dept: INTERNAL MEDICINE CLINIC | Facility: CLINIC | Age: 55
End: 2020-04-02

## 2020-04-03 NOTE — TELEPHONE ENCOUNTER
Patient was evaluated in the office. Her headache is getting better but not gone. She is advised that headache pain from concussion may last up to 4 to 6 weeks. She will need to rest, avoid watching TV. Avoid heavy reading activities. She will give us a call if any worsening.

## 2020-04-06 ENCOUNTER — HOSPITAL ENCOUNTER (EMERGENCY)
Facility: HOSPITAL | Age: 55
Discharge: HOME | End: 2020-04-06
Attending: EMERGENCY MEDICINE
Payer: COMMERCIAL

## 2020-04-06 VITALS
HEIGHT: 69 IN | SYSTOLIC BLOOD PRESSURE: 138 MMHG | DIASTOLIC BLOOD PRESSURE: 89 MMHG | HEART RATE: 87 BPM | BODY MASS INDEX: 28.88 KG/M2 | RESPIRATION RATE: 16 BRPM | TEMPERATURE: 99 F | WEIGHT: 195 LBS | OXYGEN SATURATION: 98 %

## 2020-04-06 DIAGNOSIS — Z20.822 SUSPECTED COVID-19 VIRUS INFECTION: Primary | ICD-10-CM

## 2020-04-06 PROCEDURE — 99283 EMERGENCY DEPT VISIT LOW MDM: CPT

## 2020-04-06 NOTE — ED INITIAL ASSESSMENT (HPI)
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  Patient states she is not using her inhaler at

## 2020-04-06 NOTE — ED PROVIDER NOTES
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 Has not been using her inhaler.  Subjec Current:/89   Pulse 87   Temp 98.7 °F (37.1 °C) (Oral)   Resp 16   Ht 175.3 cm (5' 9\")   Wt 88.5 kg   SpO2 98%   BMI 28.80 kg/m²         Physical Exam  Vitals signs and nursing note reviewed.    Constitutional:       Appearance: Normal appearan

## 2020-05-11 ENCOUNTER — LAB ENCOUNTER (OUTPATIENT)
Dept: LAB | Facility: HOSPITAL | Age: 55
End: 2020-05-11
Attending: PREVENTIVE MEDICINE
Payer: COMMERCIAL

## 2020-05-11 ENCOUNTER — TELEPHONE (OUTPATIENT)
Dept: INTERNAL MEDICINE CLINIC | Facility: HOSPITAL | Age: 55
End: 2020-05-11

## 2020-05-11 DIAGNOSIS — Z20.822 EXPOSURE TO COVID-19 VIRUS: Primary | ICD-10-CM

## 2020-05-11 DIAGNOSIS — Z20.822 EXPOSURE TO COVID-19 VIRUS: ICD-10-CM

## 2020-06-15 ENCOUNTER — OFFICE VISIT (OUTPATIENT)
Dept: OTOLARYNGOLOGY | Facility: CLINIC | Age: 55
End: 2020-06-15
Payer: COMMERCIAL

## 2020-06-15 VITALS
DIASTOLIC BLOOD PRESSURE: 89 MMHG | SYSTOLIC BLOOD PRESSURE: 124 MMHG | WEIGHT: 195 LBS | BODY MASS INDEX: 28.88 KG/M2 | HEIGHT: 69 IN | TEMPERATURE: 99 F

## 2020-06-15 DIAGNOSIS — J34.89 NASAL PAIN: Primary | ICD-10-CM

## 2020-06-16 NOTE — PROGRESS NOTES
Harpal Lee is a 48year old female. Patient presents with:  Nose Problem: pt presents with a nasal pain after a covid test back in april     HPI:   Nearly 2 months ago she had a COVID test.  It was negative.   She has been continuing to have severe rashes  RESPIRATORY: denies shortness of breath with exertion  NEURO: denies headaches    EXAM:   /89 (BP Location: Right arm, Patient Position: Sitting, Cuff Size: large)   Temp 98.6 °F (37 °C) (Tympanic)   Ht 5' 9\" (1.753 m)   Wt 195 lb (88.5 kg)

## 2020-06-29 ENCOUNTER — NURSE ONLY (OUTPATIENT)
Dept: LAB | Age: 55
End: 2020-06-29
Attending: PREVENTIVE MEDICINE

## 2020-06-29 DIAGNOSIS — Z78.9 PARTICIPANT IN HEALTH AND WELLNESS PLAN: Primary | ICD-10-CM

## 2020-06-29 PROCEDURE — 86769 SARS-COV-2 COVID-19 ANTIBODY: CPT

## 2020-06-30 DIAGNOSIS — S06.0X0D CONCUSSION WITHOUT LOSS OF CONSCIOUSNESS, SUBSEQUENT ENCOUNTER: ICD-10-CM

## 2020-06-30 LAB — SARS-COV-2 IGG SERPLBLD QL IA.RAPID: NEGATIVE

## 2020-07-01 RX ORDER — FLUTICASONE PROPIONATE 50 MCG
SPRAY, SUSPENSION (ML) NASAL
Qty: 3 BOTTLE | Refills: 1 | Status: SHIPPED | OUTPATIENT
Start: 2020-07-01 | End: 2021-04-27

## 2020-07-11 ENCOUNTER — TELEPHONE (OUTPATIENT)
Dept: INTERNAL MEDICINE CLINIC | Facility: HOSPITAL | Age: 55
End: 2020-07-11

## 2020-07-11 ENCOUNTER — LAB ENCOUNTER (OUTPATIENT)
Dept: LAB | Facility: HOSPITAL | Age: 55
End: 2020-07-11
Attending: PREVENTIVE MEDICINE
Payer: COMMERCIAL

## 2020-07-11 DIAGNOSIS — Z20.822 SUSPECTED COVID-19 VIRUS INFECTION: ICD-10-CM

## 2020-07-11 DIAGNOSIS — Z20.822 SUSPECTED COVID-19 VIRUS INFECTION: Primary | ICD-10-CM

## 2020-07-11 LAB — SARS-COV-2 RNA RESP QL NAA+PROBE: NOT DETECTED

## 2020-07-23 ENCOUNTER — OFFICE VISIT (OUTPATIENT)
Dept: INTERNAL MEDICINE CLINIC | Facility: CLINIC | Age: 55
End: 2020-07-23
Payer: COMMERCIAL

## 2020-07-23 VITALS — SYSTOLIC BLOOD PRESSURE: 126 MMHG | HEART RATE: 81 BPM | DIASTOLIC BLOOD PRESSURE: 79 MMHG

## 2020-07-23 DIAGNOSIS — S99.922A INJURY OF LEFT FOOT, INITIAL ENCOUNTER: Primary | ICD-10-CM

## 2020-07-23 PROCEDURE — 90715 TDAP VACCINE 7 YRS/> IM: CPT | Performed by: NURSE PRACTITIONER

## 2020-07-23 PROCEDURE — 90471 IMMUNIZATION ADMIN: CPT | Performed by: NURSE PRACTITIONER

## 2020-07-23 PROCEDURE — 99213 OFFICE O/P EST LOW 20 MIN: CPT | Performed by: NURSE PRACTITIONER

## 2020-07-23 NOTE — ASSESSMENT & PLAN NOTE
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 This morning she was in her basement and she scraped her left foot on a nail. She cleaned it with alcohol and peroxide. Wound is 3 mm long and 0 cm wide.   There

## 2020-07-23 NOTE — PROGRESS NOTES
HPI:    Patient ID: Fang Briones is a 48year old female. HPI Patient left foot scrap. This morning the injury occurred.   Vitamin B12 deficiency,  She has a history of hypertension, pulmonary embolism, mild intermittent asthma, exercise-induced a Respiratory: Negative for cough and shortness of breath. Cardiovascular: Negative for chest pain, palpitations and leg swelling. Gastrointestinal: Negative for nausea, vomiting, abdominal pain, diarrhea and constipation.    Endocrine: Negative for co She has no rales. Musculoskeletal: She exhibits no tenderness. Neurological: She is alert and oriented to person, place, and time. Skin: Skin is warm and dry. No rash noted. Patient with a left foot scrap from a nail on her left foot.    Length of scr

## 2020-08-05 ENCOUNTER — OFFICE VISIT (OUTPATIENT)
Dept: OPHTHALMOLOGY | Facility: CLINIC | Age: 55
End: 2020-08-05
Payer: COMMERCIAL

## 2020-08-05 DIAGNOSIS — L25.9 CONTACT DERMATITIS, UNSPECIFIED CONTACT DERMATITIS TYPE, UNSPECIFIED TRIGGER: Primary | ICD-10-CM

## 2020-08-05 PROCEDURE — 99202 OFFICE O/P NEW SF 15 MIN: CPT | Performed by: OPHTHALMOLOGY

## 2020-08-05 NOTE — PATIENT INSTRUCTIONS
Contact dermatitis  Use 1% Hydrocortisone cream or ointment  (over the counter) 1-2 times per day on affected area for 5-7 days, being careful not to get in eye. Use cool compresses for swelling and discomfort.      Reassured patient that there is no infec

## 2020-08-05 NOTE — ASSESSMENT & PLAN NOTE
Use 1% Hydrocortisone cream or ointment  (over the counter) 1-2 times per day on affected area for 5-7 days, being careful not to get in eye. Use cool compresses for swelling and discomfort.      Reassured patient that there is no infection, inflammation,

## 2020-08-05 NOTE — PROGRESS NOTES
Lucrecia Gonzales is a 48year old female. HPI:     HPI     New patient here for an urgent visit. Patient states she woke up this morning and both eyes  were swollen, red and crusting. She denies any discharge.   She also has a dull ache OU when she c Outpatient Medications   Medication Sig Dispense Refill   • mupirocin 2 % External Ointment Apply 1 Application topically 3 (three) times daily.  15 g 0   • Fluticasone Propionate 50 MCG/ACT Nasal Suspension INSTILL 1 SPRAY INTO EACH NOSTRIL TWICE A DAY 3 B prescriptions requested or ordered in this encounter        Follow up instructions:  Return if symptoms worsen or fail to improve.     8/5/2020  Scribed by: Sierra Eden MD

## 2020-09-19 ENCOUNTER — OFFICE VISIT (OUTPATIENT)
Dept: OBGYN CLINIC | Facility: CLINIC | Age: 55
End: 2020-09-19
Payer: COMMERCIAL

## 2020-09-19 VITALS — HEART RATE: 89 BPM | DIASTOLIC BLOOD PRESSURE: 81 MMHG | SYSTOLIC BLOOD PRESSURE: 145 MMHG

## 2020-09-19 DIAGNOSIS — R87.612 PAPANICOLAOU SMEAR OF CERVIX WITH LOW GRADE SQUAMOUS INTRAEPITHELIAL LESION (LGSIL): ICD-10-CM

## 2020-09-19 DIAGNOSIS — Z01.419 ENCOUNTER FOR GYNECOLOGICAL EXAMINATION WITHOUT ABNORMAL FINDING: Primary | ICD-10-CM

## 2020-09-19 PROCEDURE — 3077F SYST BP >= 140 MM HG: CPT | Performed by: OBSTETRICS & GYNECOLOGY

## 2020-09-19 PROCEDURE — 99396 PREV VISIT EST AGE 40-64: CPT | Performed by: OBSTETRICS & GYNECOLOGY

## 2020-09-19 PROCEDURE — 3079F DIAST BP 80-89 MM HG: CPT | Performed by: OBSTETRICS & GYNECOLOGY

## 2020-09-19 RX ORDER — PLECANATIDE 3 MG/1
1 TABLET ORAL DAILY
COMMUNITY
Start: 2020-07-24 | End: 2021-01-18

## 2020-09-19 NOTE — PROGRESS NOTES
Carlos Sol is a 50year old female  No LMP recorded. Patient is premenopausal. who presents for Patient presents with:  Gyn Exam: Annual   She has no gyne complaints.     OBSTETRICS HISTORY:  OB History     T0    L0    SAB0  TAB0  Ec Smoking status: Never Smoker      Smokeless tobacco: Never Used    Substance and Sexual Activity      Alcohol use: No        Alcohol/week: 0.0 standard drinks      Drug use: Never      Sexual activity: Not on file    Lifestyle      Physical activity DAY, Disp: 3 Bottle, Rfl: 1  •  mupirocin 2 % External Ointment, Apply to nares twice daily for 14 days, Disp: 15 g, Rfl: 0  •  topiramate 50 MG Oral Tab, Take 1 tablet (50 mg total) by mouth 2 (two) times daily. , Disp: 180 tablet, Rfl: 1  •  Albuterol [facility] auscultation bilaterally  Cardiovascular: regular rate and rhythm, no significant murmur  Abdomen:  soft, nontender, nondistended, no masses, healed abdominoplasty scars  Skin/Hair: no unusual rashes or bruises  Extremities: no edema, no cyanosis  Psychiat

## 2020-09-21 LAB — HPV I/H RISK 1 DNA SPEC QL NAA+PROBE: NEGATIVE

## 2020-09-21 RX ORDER — SPIRONOLACTONE 50 MG/1
TABLET, FILM COATED ORAL
Qty: 90 TABLET | Refills: 3 | Status: SHIPPED | OUTPATIENT
Start: 2020-09-21 | End: 2022-07-02 | Stop reason: DRUGHIGH

## 2020-09-25 ENCOUNTER — OFFICE VISIT (OUTPATIENT)
Dept: PODIATRY CLINIC | Facility: CLINIC | Age: 55
End: 2020-09-25
Payer: COMMERCIAL

## 2020-09-25 DIAGNOSIS — L60.0 INGROWN TOENAIL OF LEFT FOOT WITH INFECTION: Primary | ICD-10-CM

## 2020-09-25 NOTE — PROGRESS NOTES
HPI:    Patient ID: Gui Valdez is a 50year old female. This 60-year-old female presents and is concerned about the medial border of the left great toe. It is my sense that its not properly cared for.   Today I reduced his left great toenail it se

## 2020-12-16 ENCOUNTER — NURSE ONLY (OUTPATIENT)
Dept: LAB | Facility: HOSPITAL | Age: 55
End: 2020-12-16
Attending: PREVENTIVE MEDICINE

## 2020-12-16 ENCOUNTER — TELEPHONE (OUTPATIENT)
Dept: INTERNAL MEDICINE CLINIC | Facility: HOSPITAL | Age: 55
End: 2020-12-16

## 2020-12-16 DIAGNOSIS — Z20.822 SUSPECTED 2019 NOVEL CORONAVIRUS INFECTION: ICD-10-CM

## 2020-12-16 DIAGNOSIS — Z20.822 SUSPECTED 2019 NOVEL CORONAVIRUS INFECTION: Primary | ICD-10-CM

## 2020-12-16 LAB — SARS-COV-2 AG RESP QL IA.RAPID: NOT DETECTED

## 2020-12-16 PROCEDURE — 87426 SARSCOV CORONAVIRUS AG IA: CPT

## 2020-12-16 NOTE — TELEPHONE ENCOUNTER
Results and RTW guidelines:    COVID RESULT GIVEN:      Test type:    [x] Rapid         []       [x] NEGATIVE     Ordered  retest?  []Yes   [x] No (skip to RTW)        [] Positive   - Monitor sx and temperature    - Employee should quarantine

## 2020-12-16 NOTE — TELEPHONE ENCOUNTER
Department: Urology-[facility]                           [] [facility]  []CLAUDETTE   [x] [facility]    Dept Manager/Supervisor/team or clinical lead: Mariam Alvarez    Position:  [] MD     [] RN     [] Respiratory Therapist     [] PCT     [] Other Clinical Supervi yes, who:   Do you share a workspace? Yes []   No [x]       If yes, with whom? Do you have any family members sick at home?      [] Yes    [x] No   If yes, explain:     NOTES: (narrative documentation)          PLAN:   [x]No Known Exposure :  [x] Symptoma

## 2020-12-19 ENCOUNTER — LAB ENCOUNTER (OUTPATIENT)
Dept: LAB | Age: 55
End: 2020-12-19
Attending: PREVENTIVE MEDICINE

## 2020-12-19 ENCOUNTER — HOSPITAL ENCOUNTER (OUTPATIENT)
Age: 55
Discharge: HOME | End: 2020-12-19
Attending: EMERGENCY MEDICINE
Payer: COMMERCIAL

## 2020-12-19 ENCOUNTER — TELEPHONE (OUTPATIENT)
Dept: INTERNAL MEDICINE CLINIC | Facility: HOSPITAL | Age: 55
End: 2020-12-19

## 2020-12-19 VITALS
HEART RATE: 99 BPM | OXYGEN SATURATION: 100 % | HEIGHT: 69 IN | TEMPERATURE: 98 F | BODY MASS INDEX: 29 KG/M2 | SYSTOLIC BLOOD PRESSURE: 143 MMHG | RESPIRATION RATE: 16 BRPM | DIASTOLIC BLOOD PRESSURE: 87 MMHG

## 2020-12-19 DIAGNOSIS — Z20.822 COVID-19 RULED OUT: ICD-10-CM

## 2020-12-19 DIAGNOSIS — Z20.822 COVID-19 RULED OUT: Primary | ICD-10-CM

## 2020-12-19 DIAGNOSIS — J40 BRONCHITIS: Primary | ICD-10-CM

## 2020-12-19 PROCEDURE — 99203 OFFICE O/P NEW LOW 30 MIN: CPT | Performed by: EMERGENCY MEDICINE

## 2020-12-19 RX ORDER — ALBUTEROL SULFATE 90 UG/1
2 AEROSOL, METERED RESPIRATORY (INHALATION) EVERY 4 HOURS PRN
Qty: 1 INHALER | Refills: 0 | Status: SHIPPED | OUTPATIENT
Start: 2020-12-19 | End: 2021-01-18

## 2020-12-19 RX ORDER — BENZONATATE 100 MG/1
100 CAPSULE ORAL 3 TIMES DAILY PRN
Qty: 30 CAPSULE | Refills: 0 | Status: SHIPPED | OUTPATIENT
Start: 2020-12-19 | End: 2021-01-18

## 2020-12-19 RX ORDER — METHYLPREDNISOLONE 4 MG/1
TABLET ORAL
Qty: 1 PACKAGE | Refills: 0 | Status: SHIPPED | OUTPATIENT
Start: 2020-12-19 | End: 2021-01-26

## 2020-12-19 RX ORDER — ECHINACEA PURPUREA EXTRACT 125 MG
2 TABLET ORAL AS NEEDED
Qty: 60 ML | Refills: 0 | Status: SHIPPED | OUTPATIENT
Start: 2020-12-19 | End: 2020-12-24

## 2020-12-19 RX ORDER — AZITHROMYCIN 250 MG/1
TABLET, FILM COATED ORAL
Qty: 6 TABLET | Refills: 0 | Status: SHIPPED | OUTPATIENT
Start: 2020-12-19 | End: 2020-12-24

## 2020-12-19 NOTE — TELEPHONE ENCOUNTER
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 Yes []   No [x]       If yes, with whom? Do you have any family members sick at home?      [] Yes    [x] No   If yes, explain:      NOTES: (narrative documentation)  Onset of symptoms 12/15/2020 tested on [facility]

## 2020-12-19 NOTE — ED INITIAL ASSESSMENT (HPI)
Pt complaining of cough since Tuesday. Pt got a rapid covid test on Wednesday which was negative. Pt states dry cough. Pt states cough is worse at night. Pt has been using albtuerol inhaler. Pt does not have nebulizer.   Pt did another covid test today

## 2020-12-19 NOTE — ED PROVIDER NOTES
Patient Seen in: [facility]    History   Patient presents with:  Cough/URI    Stated Complaint: cough    HPI    Patient here with cough, congestion for 3 days. No travel, no known sick contacts.   Patient denies sig shortness of breath, cough by mouth daily for 1 day, THEN 1 tablet (250 mg total) daily for 4 days. TRULANCE 3 MG Oral Tab,  Take 1 tablet by mouth daily.    Albuterol Sulfate HFA (PROAIR HFA) 108 (90 Base) MCG/ACT Inhalation Aero Soln,  Inhale 2 puffs into the lungs every 4 (four) Resp 16   Temp 97.7 °F (36.5 °C)   Temp src Temporal   SpO2 100 %   O2 Device None (Room air)       Current:/87   Pulse 99   Temp 97.7 °F (36.5 °C) (Temporal)   Resp 16   Ht 175.3 cm (5' 9\")   SpO2 100%   BMI 28.80 kg/m²   PULSE OX   GENERAL: well (500 mg total) by mouth daily for 1 day, THEN 1 tablet (250 mg total) daily for 4 days. Qty: 6 tablet Refills: 0    !! - Potential duplicate medications found. Please discuss with provider.

## 2020-12-20 ENCOUNTER — IMMUNIZATION (OUTPATIENT)
Dept: LAB | Facility: HOSPITAL | Age: 55
End: 2020-12-20
Attending: PREVENTIVE MEDICINE
Payer: COMMERCIAL

## 2020-12-20 DIAGNOSIS — Z23 NEED FOR VACCINATION: ICD-10-CM

## 2020-12-20 PROCEDURE — 0001A PFIZER-BIONTECH COVID-19 VACCINE: CPT

## 2020-12-21 NOTE — PROGRESS NOTES
JACKY and Reynaldo note to call the employee covid hotline if symptoms worsen. Employee already reviewed result in [place].  Manager emailed information

## 2020-12-21 NOTE — TELEPHONE ENCOUNTER
Results and RTW guidelines:    COVID RESULT GIVEN:      Test type:    [] Rapid         [x]       [x] NEGATIVE      [] Positive        Notes: Employee stated that after being tested today  She went to an [facility] and was diagnose  with bronch

## 2020-12-21 NOTE — TELEPHONE ENCOUNTER
SHERINEM for employee. Employee reviewed results via [employer]. Also left a message in [employer].

## 2020-12-22 ENCOUNTER — TELEPHONE (OUTPATIENT)
Dept: INTERNAL MEDICINE CLINIC | Facility: CLINIC | Age: 55
End: 2020-12-22

## 2020-12-22 DIAGNOSIS — IMO0001 MODERATE INTERMITTENT ASTHMA WITH ACUTE EXACERBATION: Primary | ICD-10-CM

## 2020-12-22 NOTE — TELEPHONE ENCOUNTER
Patient went to urgent care on 12/19 and they gave her medications but patient states its bronchitis and states she needs a nebulizer.  Please advise
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
## 2020-12-22 NOTE — TELEPHONE ENCOUNTER
Sometimes her pharmacy might have it. I actually don't know. She could call [facility].

## 2020-12-23 RX ORDER — ALBUTEROL SULFATE 2.5 MG/3ML
2.5 SOLUTION RESPIRATORY (INHALATION) EVERY 4 HOURS PRN
Qty: 50 AMPULE | Refills: 3 | Status: SHIPPED | OUTPATIENT
Start: 2020-12-23

## 2020-12-23 RX ORDER — ALBUTEROL SULFATE 90 UG/1
2 AEROSOL, METERED RESPIRATORY (INHALATION) EVERY 4 HOURS PRN
Qty: 1 INHALER | Refills: 0 | Status: CANCELLED | OUTPATIENT
Start: 2020-12-23 | End: 2021-01-22

## 2020-12-23 NOTE — TELEPHONE ENCOUNTER
•  Albuterol Sulfate HFA (PROAIR HFA) 108 (90 Base) MCG/ACT Inhalation Aero Soln, Inhale 2 puffs into the lungs every 4 (four) hours as needed for Wheezing., Disp: 1 Inhaler, Rfl: 2    Message from Pharmacy: Pt requested new rx.   Please send the prescripti

## 2020-12-26 ENCOUNTER — APPOINTMENT (OUTPATIENT)
Dept: CT IMAGING | Age: 55
End: 2020-12-26
Attending: EMERGENCY MEDICINE
Payer: COMMERCIAL

## 2020-12-26 ENCOUNTER — HOSPITAL ENCOUNTER (OUTPATIENT)
Age: 55
Discharge: OTHER INSTITUTION | End: 2020-12-26
Payer: COMMERCIAL

## 2020-12-26 ENCOUNTER — APPOINTMENT (OUTPATIENT)
Dept: GENERAL RADIOLOGY | Age: 55
End: 2020-12-26
Attending: EMERGENCY MEDICINE
Payer: COMMERCIAL

## 2020-12-26 ENCOUNTER — HOSPITAL ENCOUNTER (OUTPATIENT)
Age: 55
Discharge: HOME | End: 2020-12-26
Attending: EMERGENCY MEDICINE
Payer: COMMERCIAL

## 2020-12-26 VITALS
WEIGHT: 202 LBS | TEMPERATURE: 97 F | BODY MASS INDEX: 29.92 KG/M2 | DIASTOLIC BLOOD PRESSURE: 88 MMHG | OXYGEN SATURATION: 100 % | SYSTOLIC BLOOD PRESSURE: 145 MMHG | HEART RATE: 96 BPM | HEIGHT: 69 IN | RESPIRATION RATE: 18 BRPM

## 2020-12-26 VITALS
OXYGEN SATURATION: 100 % | DIASTOLIC BLOOD PRESSURE: 83 MMHG | HEART RATE: 79 BPM | SYSTOLIC BLOOD PRESSURE: 131 MMHG | RESPIRATION RATE: 18 BRPM | TEMPERATURE: 99 F

## 2020-12-26 DIAGNOSIS — M54.6 ACUTE RIGHT-SIDED THORACIC BACK PAIN: ICD-10-CM

## 2020-12-26 DIAGNOSIS — R05.3 PERSISTENT COUGH: Primary | ICD-10-CM

## 2020-12-26 DIAGNOSIS — R05.9 COUGH: Primary | ICD-10-CM

## 2020-12-26 PROCEDURE — 99215 OFFICE O/P EST HI 40 MIN: CPT

## 2020-12-26 PROCEDURE — 80047 BASIC METABLC PNL IONIZED CA: CPT

## 2020-12-26 PROCEDURE — 96360 HYDRATION IV INFUSION INIT: CPT

## 2020-12-26 PROCEDURE — 93010 ELECTROCARDIOGRAM REPORT: CPT | Performed by: EMERGENCY MEDICINE

## 2020-12-26 PROCEDURE — 71260 CT THORAX DX C+: CPT | Performed by: EMERGENCY MEDICINE

## 2020-12-26 PROCEDURE — 93010 ELECTROCARDIOGRAM REPORT: CPT

## 2020-12-26 PROCEDURE — 84484 ASSAY OF TROPONIN QUANT: CPT

## 2020-12-26 PROCEDURE — 99203 OFFICE O/P NEW LOW 30 MIN: CPT | Performed by: EMERGENCY MEDICINE

## 2020-12-26 PROCEDURE — 93005 ELECTROCARDIOGRAM TRACING: CPT

## 2020-12-26 PROCEDURE — 85025 COMPLETE CBC W/AUTO DIFF WBC: CPT | Performed by: EMERGENCY MEDICINE

## 2020-12-26 PROCEDURE — 85378 FIBRIN DEGRADE SEMIQUANT: CPT | Performed by: EMERGENCY MEDICINE

## 2020-12-26 RX ORDER — SODIUM CHLORIDE 9 MG/ML
1000 INJECTION, SOLUTION INTRAVENOUS ONCE
Status: COMPLETED | OUTPATIENT
Start: 2020-12-26 | End: 2020-12-26

## 2020-12-26 NOTE — ED PROVIDER NOTES
Patient Seen in: [facility]      History   Patient presents with:  Back Pain    Stated Complaint: CT Scan    HPI    The patient is a 44-year-old female with past history of asthma, hypertension, donor nephrectomy, pulmonary embolism in the pa • OTHER  2015    UFE   • PRIOR MYOMECTOMY     • UPPER GI ENDOSCOPY,EXAM     • UTERINE FIBROID EMBOLIZATION PERQ W/RAD GID                  Family history reviewed with patient/caregiver and is not pertinent to presenting problem.     Social History    Tob D-DIMER (POC) - Normal   ISTAT TROPONIN - Normal   POCT CBC     EKG    Rate, intervals and axes as noted on EKG Report. Rate: 85  Rhythm: Sinus Rhythm  Reading: The patient's EKG demonstrates a sinus rhythm with a rate of 85.   The patient's axis and int

## 2020-12-26 NOTE — ED PROVIDER NOTES
Patient Seen in: [facility]      History   No chief complaint on file. Stated Complaint: cough    Durga Mendoza is a 50year old female here for cough. She had negative covid testing <14 days ago, but dx with bronchitis.  Other medica Effort: Pulmonary effort is normal.      Breath sounds: Normal breath sounds. Neurological:      General: No focal deficit present. Mental Status: She is alert and oriented to person, place, and time.    Psychiatric:         Mood and Affect: Mood nor

## 2020-12-26 NOTE — ED INITIAL ASSESSMENT (HPI)
Sent from [address] [place] [address] for further eval and management for mid thoracic back pain

## 2020-12-26 NOTE — ED NOTES
Patient to [address] [place] [address] for further evaluation, patient in stable condition, ambulatory.

## 2020-12-26 NOTE — ED INITIAL ASSESSMENT (HPI)
Patient states she was seen at the [address] [facility] on 12/19 and diagnosed with bronchitis. Patient states her cough has worsened, states she took all of her prescribed medications. Patient states her cough has become more productive.   Patient states her cough

## 2021-01-01 ENCOUNTER — EXTERNAL RECORD (OUTPATIENT)
Dept: HEALTH INFORMATION MANAGEMENT | Facility: OTHER | Age: 56
End: 2021-01-01

## 2021-01-03 ENCOUNTER — HOSPITAL ENCOUNTER (OUTPATIENT)
Age: 56
Discharge: HOME | End: 2021-01-03
Payer: COMMERCIAL

## 2021-01-03 VITALS
WEIGHT: 201 LBS | HEART RATE: 97 BPM | BODY MASS INDEX: 29.77 KG/M2 | SYSTOLIC BLOOD PRESSURE: 134 MMHG | HEIGHT: 69 IN | OXYGEN SATURATION: 99 % | RESPIRATION RATE: 20 BRPM | TEMPERATURE: 98 F | DIASTOLIC BLOOD PRESSURE: 76 MMHG

## 2021-01-03 DIAGNOSIS — J06.9 UPPER RESPIRATORY TRACT INFECTION, UNSPECIFIED TYPE: Primary | ICD-10-CM

## 2021-01-03 PROCEDURE — 99213 OFFICE O/P EST LOW 20 MIN: CPT | Performed by: NURSE PRACTITIONER

## 2021-01-03 NOTE — ED INITIAL ASSESSMENT (HPI)
Patient is here with a productive cough that she states has been going on for the last three weeks. She states today she started with a runny nose and she has a headache from coughing.

## 2021-01-03 NOTE — ED PROVIDER NOTES
Patient Seen in: [facility]      History   Patient presents with:  Cough    Stated Complaint: Cough    HPI/Subjective:   HPI    This is a 53-year-old female presenting with cough and runny nose.   Patient states, that she has been dealing wit History    Tobacco Use      Smoking status: Never Smoker      Smokeless tobacco: Never Used    Alcohol use: No      Alcohol/week: 0.0 standard drinks    Drug use: Never             Review of Systems    Positive for stated complaint: Cough  Other systems ar allergies    61-year-old female well-appearing and nontoxic not hypoxic no respiratory distress with cough.   Patient's history reviewed patient's most recent visit to the [facility] [facility] 12/26/2020 noted normal labs for dimer troponin and CT

## 2021-01-04 ENCOUNTER — OFFICE VISIT (OUTPATIENT)
Dept: PULMONOLOGY | Facility: CLINIC | Age: 56
End: 2021-01-04
Payer: COMMERCIAL

## 2021-01-04 VITALS
OXYGEN SATURATION: 94 % | HEART RATE: 111 BPM | BODY MASS INDEX: 30 KG/M2 | TEMPERATURE: 98 F | DIASTOLIC BLOOD PRESSURE: 88 MMHG | RESPIRATION RATE: 18 BRPM | SYSTOLIC BLOOD PRESSURE: 160 MMHG | HEIGHT: 69 IN

## 2021-01-04 DIAGNOSIS — J45.41 MODERATE PERSISTENT ASTHMA WITH ACUTE EXACERBATION: Primary | ICD-10-CM

## 2021-01-04 PROCEDURE — 3077F SYST BP >= 140 MM HG: CPT | Performed by: INTERNAL MEDICINE

## 2021-01-04 PROCEDURE — 99244 OFF/OP CNSLTJ NEW/EST MOD 40: CPT | Performed by: INTERNAL MEDICINE

## 2021-01-04 PROCEDURE — 3008F BODY MASS INDEX DOCD: CPT | Performed by: INTERNAL MEDICINE

## 2021-01-04 PROCEDURE — 3079F DIAST BP 80-89 MM HG: CPT | Performed by: INTERNAL MEDICINE

## 2021-01-04 RX ORDER — PREDNISONE 10 MG/1
TABLET ORAL
Qty: 30 TABLET | Refills: 0 | Status: SHIPPED | OUTPATIENT
Start: 2021-01-04 | End: 2021-01-26

## 2021-01-04 RX ORDER — BUDESONIDE AND FORMOTEROL FUMARATE DIHYDRATE 160; 4.5 UG/1; UG/1
2 AEROSOL RESPIRATORY (INHALATION) 2 TIMES DAILY
Qty: 1 INHALER | Refills: 5 | Status: SHIPPED | OUTPATIENT
Start: 2021-01-04 | End: 2021-04-27

## 2021-01-04 RX ORDER — AZITHROMYCIN 250 MG/1
TABLET, FILM COATED ORAL
Qty: 6 TABLET | Refills: 0 | Status: SHIPPED | OUTPATIENT
Start: 2021-01-04 | End: 2021-01-26
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
## 2021-01-10 ENCOUNTER — IMMUNIZATION (OUTPATIENT)
Dept: LAB | Facility: HOSPITAL | Age: 56
End: 2021-01-10
Attending: PREVENTIVE MEDICINE

## 2021-01-10 DIAGNOSIS — Z23 NEED FOR VACCINATION: ICD-10-CM

## 2021-01-10 PROCEDURE — 0002A SARSCOV2 VAC 30MCG/0.3ML IM: CPT

## 2021-01-12 ENCOUNTER — TELEPHONE (OUTPATIENT)
Dept: PULMONOLOGY | Facility: CLINIC | Age: 56
End: 2021-01-12

## 2021-01-12 NOTE — TELEPHONE ENCOUNTER
Rodrick Edouard,     Patient is requesting intermittent FMLA for Asthma flare ups     1-4 episodes per month lasting up to 48 hours. Do you approve?      Thank you

## 2021-01-18 RX ORDER — PLECANATIDE 3 MG/1
TABLET ORAL
Qty: 90 TABLET | Refills: 3 | Status: SHIPPED | OUTPATIENT
Start: 2021-01-18 | End: 2021-04-07

## 2021-01-18 NOTE — TELEPHONE ENCOUNTER
Requested Prescriptions     Pending Prescriptions Disp Refills   • TRULANCE 3 MG Oral Tab [Pharmacy Med Name: Louisa Levy 3 MG TABLET] 90 tablet 3     Sig: TAKE 1 TABLET BY MOUTH EVERY DAY     LOV  3/25/19  Last colonoscopy 4/15/19  LR  7/24/2020  I sent mira

## 2021-01-26 ENCOUNTER — OFFICE VISIT (OUTPATIENT)
Dept: SURGERY | Facility: CLINIC | Age: 56
End: 2021-01-26

## 2021-01-26 VITALS — HEIGHT: 69 IN | BODY MASS INDEX: 35.87 KG/M2 | WEIGHT: 242.19 LBS

## 2021-01-26 DIAGNOSIS — I10 ESSENTIAL HYPERTENSION: Primary | ICD-10-CM

## 2021-01-26 DIAGNOSIS — E66.9 OBESITY (BMI 30-39.9): ICD-10-CM

## 2021-01-26 DIAGNOSIS — E53.8 VITAMIN B12 DEFICIENCY: ICD-10-CM

## 2021-01-26 DIAGNOSIS — E55.9 VITAMIN D DEFICIENCY: ICD-10-CM

## 2021-01-26 DIAGNOSIS — Z51.81 ENCOUNTER FOR THERAPEUTIC DRUG MONITORING: ICD-10-CM

## 2021-01-26 PROCEDURE — 99214 OFFICE O/P EST MOD 30 MIN: CPT | Performed by: INTERNAL MEDICINE

## 2021-01-26 PROCEDURE — 3008F BODY MASS INDEX DOCD: CPT | Performed by: INTERNAL MEDICINE

## 2021-01-26 RX ORDER — TOPIRAMATE 25 MG/1
25 TABLET ORAL EVERY EVENING
Qty: 30 TABLET | Refills: 2 | Status: SHIPPED | OUTPATIENT
Start: 2021-01-26 | End: 2021-03-18

## 2021-01-26 RX ORDER — PHENTERMINE HYDROCHLORIDE 15 MG/1
15 CAPSULE ORAL EVERY MORNING
Qty: 30 CAPSULE | Refills: 0 | Status: SHIPPED | OUTPATIENT
Start: 2021-01-26 | End: 2021-03-18

## 2021-01-26 NOTE — PROGRESS NOTES
[address]  Dept: [phone]     Patient:  Randall Perez  :      1965  MRN:      [id number]    Chief Complaint:  Patient presents w (three) times daily.  15 g 0   • Fluticasone Propionate 50 MCG/ACT Nasal Suspension INSTILL 1 SPRAY INTO EACH NOSTRIL TWICE A DAY 3 Bottle 1   • mupirocin 2 % External Ointment Apply to nares twice daily for 14 days 15 g 0   • Albuterol Sulfate HFA (PROAIR Physically abused: Not on file        Forced sexual activity: Not on file    Other Topics      Concerns:        Grew up on a farm: Not Asked        History of tanning: Not Asked        Outdoor occupation: Not Asked        Pt has a pacemaker: No        Pt h no  · Toughest challenge:  sweets    Nutritional Goals  Limit carbohydrates to 100 gms per day, Eat 100-200 calories within 1 hour of waking  and Eat 3-4 cups of fresh fruits or vegetables daily    Behavior Modifications Reviewed and Discussed  Eat breakfa Vitals reviewed. ASSESSMENT     Encounter Diagnosis(ses):   Essential hypertension  (primary encounter diagnosis)  Vitamin b12 deficiency  Encounter for therapeutic drug monitoring  Obesity (bmi 30-39. 9)  Vitamin d deficiency    PLAN   Patient is not

## 2021-02-02 ENCOUNTER — TELEPHONE (OUTPATIENT)
Dept: OBGYN CLINIC | Facility: CLINIC | Age: 56
End: 2021-02-02

## 2021-02-02 ENCOUNTER — LAB ENCOUNTER (OUTPATIENT)
Dept: LAB | Facility: HOSPITAL | Age: 56
End: 2021-02-02
Attending: INTERNAL MEDICINE
Payer: COMMERCIAL

## 2021-02-02 DIAGNOSIS — Z00.00 ROUTINE HEALTH MAINTENANCE: ICD-10-CM

## 2021-02-02 DIAGNOSIS — Z51.81 ENCOUNTER FOR THERAPEUTIC DRUG MONITORING: ICD-10-CM

## 2021-02-02 DIAGNOSIS — I10 ESSENTIAL HYPERTENSION: ICD-10-CM

## 2021-02-02 DIAGNOSIS — E55.9 VITAMIN D DEFICIENCY: ICD-10-CM

## 2021-02-02 DIAGNOSIS — E53.8 VITAMIN B12 DEFICIENCY: ICD-10-CM

## 2021-02-02 DIAGNOSIS — E66.9 OBESITY (BMI 30-39.9): ICD-10-CM

## 2021-02-02 LAB
ALBUMIN SERPL-MCNC: 3.5 G/DL (ref 3.4–5)
ALBUMIN/GLOB SERPL: 0.8 {RATIO} (ref 1–2)
ALP LIVER SERPL-CCNC: 88 U/L
ALT SERPL-CCNC: 22 U/L
ANION GAP SERPL CALC-SCNC: 5 MMOL/L (ref 0–18)
AST SERPL-CCNC: 17 U/L (ref 15–37)
BASOPHILS # BLD AUTO: 0.04 X10(3) UL (ref 0–0.2)
BASOPHILS NFR BLD AUTO: 0.6 %
BILIRUB SERPL-MCNC: 0.6 MG/DL (ref 0.1–2)
BUN BLD-MCNC: 13 MG/DL (ref 7–18)
BUN/CREAT SERPL: 9.6 (ref 10–20)
CALCIUM BLD-MCNC: 9.3 MG/DL (ref 8.5–10.1)
CHLORIDE SERPL-SCNC: 112 MMOL/L (ref 98–112)
CHOLEST SMN-MCNC: 137 MG/DL (ref ?–200)
CO2 SERPL-SCNC: 26 MMOL/L (ref 21–32)
CREAT BLD-MCNC: 1.36 MG/DL
DEPRECATED RDW RBC AUTO: 47.1 FL (ref 35.1–46.3)
EOSINOPHIL # BLD AUTO: 0.18 X10(3) UL (ref 0–0.7)
EOSINOPHIL NFR BLD AUTO: 2.9 %
ERYTHROCYTE [DISTWIDTH] IN BLOOD BY AUTOMATED COUNT: 14.4 % (ref 11–15)
GLOBULIN PLAS-MCNC: 4.5 G/DL (ref 2.8–4.4)
GLUCOSE BLD-MCNC: 83 MG/DL (ref 70–99)
HCT VFR BLD AUTO: 43.6 %
HDLC SERPL-MCNC: 51 MG/DL (ref 40–59)
HGB BLD-MCNC: 13.7 G/DL
IMM GRANULOCYTES # BLD AUTO: 0.02 X10(3) UL (ref 0–1)
IMM GRANULOCYTES NFR BLD: 0.3 %
LDLC SERPL CALC-MCNC: 71 MG/DL (ref ?–100)
LYMPHOCYTES # BLD AUTO: 2.2 X10(3) UL (ref 1–4)
LYMPHOCYTES NFR BLD AUTO: 35.5 %
M PROTEIN MFR SERPL ELPH: 8 G/DL (ref 6.4–8.2)
MCH RBC QN AUTO: 28.1 PG (ref 26–34)
MCHC RBC AUTO-ENTMCNC: 31.4 G/DL (ref 31–37)
MCV RBC AUTO: 89.5 FL
MONOCYTES # BLD AUTO: 0.47 X10(3) UL (ref 0.1–1)
MONOCYTES NFR BLD AUTO: 7.6 %
NEUTROPHILS # BLD AUTO: 3.29 X10 (3) UL (ref 1.5–7.7)
NEUTROPHILS # BLD AUTO: 3.29 X10(3) UL (ref 1.5–7.7)
NEUTROPHILS NFR BLD AUTO: 53.1 %
NONHDLC SERPL-MCNC: 86 MG/DL (ref ?–130)
OSMOLALITY SERPL CALC.SUM OF ELEC: 295 MOSM/KG (ref 275–295)
PATIENT FASTING Y/N/NP: YES
PATIENT FASTING Y/N/NP: YES
PLATELET # BLD AUTO: 278 10(3)UL (ref 150–450)
POTASSIUM SERPL-SCNC: 3.7 MMOL/L (ref 3.5–5.1)
RBC # BLD AUTO: 4.87 X10(6)UL
SODIUM SERPL-SCNC: 143 MMOL/L (ref 136–145)
TRIGL SERPL-MCNC: 74 MG/DL (ref 30–149)
TSI SER-ACNC: 2.61 MIU/ML (ref 0.36–3.74)
VIT B12 SERPL-MCNC: 547 PG/ML (ref 193–986)
VLDLC SERPL CALC-MCNC: 15 MG/DL (ref 0–30)
WBC # BLD AUTO: 6.2 X10(3) UL (ref 4–11)

## 2021-02-02 PROCEDURE — 80061 LIPID PANEL: CPT

## 2021-02-02 PROCEDURE — 82607 VITAMIN B-12: CPT

## 2021-02-02 PROCEDURE — 82397 CHEMILUMINESCENT ASSAY: CPT

## 2021-02-02 PROCEDURE — 84443 ASSAY THYROID STIM HORMONE: CPT

## 2021-02-02 PROCEDURE — 36415 COLL VENOUS BLD VENIPUNCTURE: CPT

## 2021-02-02 PROCEDURE — 82306 VITAMIN D 25 HYDROXY: CPT

## 2021-02-02 PROCEDURE — 85025 COMPLETE CBC W/AUTO DIFF WBC: CPT

## 2021-02-02 PROCEDURE — 80053 COMPREHEN METABOLIC PANEL: CPT

## 2021-02-02 NOTE — TELEPHONE ENCOUNTER
Patient need to see  , period was missing for over 18 month and now pt is having some  cramps. First able appt with DR. Cruz was schedule on 04/05/21 , patient wants if something sooner is possible . please advice.

## 2021-02-02 NOTE — TELEPHONE ENCOUNTER
Due to office rescheduling for Dr Kole Alvarado, I just do not have any sooner appts earlier than that. I can call her if something opens up.

## 2021-02-02 NOTE — TELEPHONE ENCOUNTER
Informed pt of CAP recs below. Pt placed on waitlist. Pt requesting appt for 4/5/2021 to be cx'd. Pt verbalized understanding.

## 2021-02-02 NOTE — TELEPHONE ENCOUNTER
Pt calling to report that she has not had a period in 18 months and started having vaginal bleeding and bad cramping since yesterday. Pt stated that the bleeding is like a normal period flow. Pt stated that she wants to curl up in a ball due to the cramps.

## 2021-02-03 LAB — 25(OH)D3 SERPL-MCNC: 31.7 NG/ML (ref 30–100)

## 2021-02-04 LAB — LEPTIN: 59.5 NG/ML

## 2021-02-09 ENCOUNTER — OFFICE VISIT (OUTPATIENT)
Dept: INTERNAL MEDICINE CLINIC | Facility: CLINIC | Age: 56
End: 2021-02-09
Payer: COMMERCIAL

## 2021-02-09 VITALS — HEART RATE: 94 BPM | DIASTOLIC BLOOD PRESSURE: 85 MMHG | SYSTOLIC BLOOD PRESSURE: 125 MMHG

## 2021-02-09 DIAGNOSIS — I10 ESSENTIAL HYPERTENSION: ICD-10-CM

## 2021-02-09 DIAGNOSIS — N95.0 POST-MENOPAUSAL BLEEDING: ICD-10-CM

## 2021-02-09 DIAGNOSIS — Z00.00 ROUTINE HEALTH MAINTENANCE: Primary | ICD-10-CM

## 2021-02-09 DIAGNOSIS — J45.40 MODERATE PERSISTENT ASTHMA WITHOUT COMPLICATION: ICD-10-CM

## 2021-02-09 DIAGNOSIS — Z23 NEED FOR VACCINATION: ICD-10-CM

## 2021-02-09 DIAGNOSIS — K58.1 IRRITABLE BOWEL SYNDROME WITH CONSTIPATION: ICD-10-CM

## 2021-02-09 PROBLEM — S99.922A INJURY OF LEFT FOOT: Status: RESOLVED | Noted: 2020-07-23 | Resolved: 2021-02-09

## 2021-02-09 PROBLEM — S06.0X0A CONCUSSION WITHOUT LOSS OF CONSCIOUSNESS: Status: RESOLVED | Noted: 2020-03-31 | Resolved: 2021-02-09

## 2021-02-09 PROBLEM — L27.0 RASH AS ADVERSE EFFECT OF PENICILLIN: Status: RESOLVED | Noted: 2017-09-21 | Resolved: 2021-02-09

## 2021-02-09 PROBLEM — T36.0X5A RASH AS ADVERSE EFFECT OF PENICILLIN: Status: RESOLVED | Noted: 2017-09-21 | Resolved: 2021-02-09

## 2021-02-09 PROBLEM — J45.990 EXERCISE-INDUCED ASTHMA: Status: RESOLVED | Noted: 2017-02-23 | Resolved: 2021-02-09

## 2021-02-09 PROBLEM — J45.20 MILD INTERMITTENT ASTHMA WITHOUT COMPLICATION (HCC): Status: RESOLVED | Noted: 2020-01-09 | Resolved: 2021-02-09

## 2021-02-09 PROBLEM — J45.20 MILD INTERMITTENT ASTHMA WITHOUT COMPLICATION: Status: RESOLVED | Noted: 2020-01-09 | Resolved: 2021-02-09

## 2021-02-09 PROBLEM — J45.990 EXERCISE-INDUCED ASTHMA (HCC): Status: RESOLVED | Noted: 2017-02-23 | Resolved: 2021-02-09

## 2021-02-09 PROCEDURE — 3074F SYST BP LT 130 MM HG: CPT | Performed by: INTERNAL MEDICINE

## 2021-02-09 PROCEDURE — 99396 PREV VISIT EST AGE 40-64: CPT | Performed by: INTERNAL MEDICINE

## 2021-02-09 PROCEDURE — 3079F DIAST BP 80-89 MM HG: CPT | Performed by: INTERNAL MEDICINE

## 2021-02-09 PROCEDURE — 99214 OFFICE O/P EST MOD 30 MIN: CPT | Performed by: INTERNAL MEDICINE

## 2021-02-09 RX ORDER — SPIRONOLACTONE 50 MG/1
50 TABLET, FILM COATED ORAL DAILY
Qty: 90 TABLET | Refills: 1 | Status: SHIPPED | OUTPATIENT
Start: 2021-02-09 | End: 2021-10-03

## 2021-02-09 NOTE — ASSESSMENT & PLAN NOTE
Unremarkable exam.  She is up-to-date on her colonoscopy  Immunizations were reviewed. She will schedule the Shingrix vaccine series. Counseled pt regarding diet and exercise. Reviewed labs with pt.

## 2021-02-09 NOTE — ASSESSMENT & PLAN NOTE
Reviewed Dr. Dawit Camarena note. Pt feels better on current regimen. She will f/u with Dr. Arias Sessions.

## 2021-02-09 NOTE — PROGRESS NOTES
HPI:    Patient ID: Orlin Hernandez is a 50year old female. HPI: Pt is here for a physical.  Pt went to urgent care 3 times for an asthma attacks. Then she went to pulmonary. He started her on Prednisone, symbicort and a zpak.   She still has raspi EVERY DAY 90 tablet 3   • Budesonide-Formoterol Fumarate (SYMBICORT) 160-4.5 MCG/ACT Inhalation Aerosol Inhale 2 puffs into the lungs 2 (two) times daily. 1 Inhaler 5   • mupirocin 2 % External Ointment Apply 1 Application topically 3 (three) times daily. swallowing. Eyes: Negative for pain and redness. Respiratory: Negative for cough, shortness of breath and wheezing. Cardiovascular: Negative for chest pain and palpitations. Gastrointestinal: Positive for constipation.  Negative for nausea, vomiti exhibits no mass. There is no abdominal tenderness. Musculoskeletal: Normal range of motion. Left shoulder: She exhibits pain. She exhibits normal range of motion. Lymphadenopathy:     She has no cervical adenopathy.    Neurological: She is alert and

## 2021-02-11 ENCOUNTER — TELEPHONE (OUTPATIENT)
Dept: OBGYN CLINIC | Facility: CLINIC | Age: 56
End: 2021-02-11

## 2021-02-11 ENCOUNTER — OFFICE VISIT (OUTPATIENT)
Dept: OBGYN CLINIC | Facility: CLINIC | Age: 56
End: 2021-02-11
Payer: COMMERCIAL

## 2021-02-11 VITALS
SYSTOLIC BLOOD PRESSURE: 118 MMHG | HEART RATE: 87 BPM | BODY MASS INDEX: 36 KG/M2 | WEIGHT: 242 LBS | DIASTOLIC BLOOD PRESSURE: 87 MMHG

## 2021-02-11 DIAGNOSIS — N95.0 POSTMENOPAUSAL BLEEDING: Primary | ICD-10-CM

## 2021-02-11 PROCEDURE — 3074F SYST BP LT 130 MM HG: CPT | Performed by: OBSTETRICS & GYNECOLOGY

## 2021-02-11 PROCEDURE — 58100 BIOPSY OF UTERUS LINING: CPT | Performed by: OBSTETRICS & GYNECOLOGY

## 2021-02-11 PROCEDURE — 3079F DIAST BP 80-89 MM HG: CPT | Performed by: OBSTETRICS & GYNECOLOGY

## 2021-02-11 PROCEDURE — 99212 OFFICE O/P EST SF 10 MIN: CPT | Performed by: OBSTETRICS & GYNECOLOGY

## 2021-02-11 NOTE — PROGRESS NOTES
Ade Pruitt    1965       Patient presents with:  Gyn Problem: Pt says that she hadent had a period in about 1-2 years, pt says that she started bleeding an has been for the past 10 days now.   pt had not had menses since 2019 but began b 1    •  Phentermine HCl 15 MG Oral Cap, Take 1 capsule (15 mg total) by mouth every morning., Disp: 30 capsule, Rfl: 0    •  topiramate 25 MG Oral Tab, Take 1 tablet (25 mg total) by mouth every evening., Disp: 30 tablet, Rfl: 2    •  TRULANCE 3 MG Oral Ta masses or tenderness  Vagina:  Normal appearance without lesions, brown discharge c/w old blood  Cervix:  Normal without tenderness on motion  Uterus: enlarged c/w known fibroids, contour, position, mobility, without tenderness  Adnexa: normal without mass

## 2021-02-11 NOTE — TELEPHONE ENCOUNTER
Patient is scheduled for Ultrasound on 02/18/2021 and would like to schedule a follow up. There are no openings until 03/06/2021 and she would like to discuss results sooner.

## 2021-02-15 ENCOUNTER — HOSPITAL ENCOUNTER (OUTPATIENT)
Dept: ULTRASOUND IMAGING | Facility: HOSPITAL | Age: 56
Discharge: HOME | End: 2021-02-15
Attending: OBSTETRICS & GYNECOLOGY
Payer: COMMERCIAL

## 2021-02-15 ENCOUNTER — HOSPITAL ENCOUNTER (OUTPATIENT)
Dept: MAMMOGRAPHY | Facility: HOSPITAL | Age: 56
Discharge: HOME | End: 2021-02-15
Attending: INTERNAL MEDICINE
Payer: COMMERCIAL

## 2021-02-15 ENCOUNTER — TELEPHONE (OUTPATIENT)
Dept: OBGYN CLINIC | Facility: CLINIC | Age: 56
End: 2021-02-15

## 2021-02-15 ENCOUNTER — HOSPITAL ENCOUNTER (OUTPATIENT)
Dept: GENERAL RADIOLOGY | Facility: HOSPITAL | Age: 56
Discharge: HOME | End: 2021-02-15
Attending: ORTHOPAEDIC SURGERY
Payer: COMMERCIAL

## 2021-02-15 ENCOUNTER — OFFICE VISIT (OUTPATIENT)
Dept: ORTHOPEDICS CLINIC | Facility: CLINIC | Age: 56
End: 2021-02-15
Payer: COMMERCIAL

## 2021-02-15 VITALS
DIASTOLIC BLOOD PRESSURE: 72 MMHG | WEIGHT: 242 LBS | HEIGHT: 69 IN | SYSTOLIC BLOOD PRESSURE: 108 MMHG | HEART RATE: 88 BPM | BODY MASS INDEX: 35.84 KG/M2

## 2021-02-15 DIAGNOSIS — N95.0 POSTMENOPAUSAL BLEEDING: ICD-10-CM

## 2021-02-15 DIAGNOSIS — M77.8 DELTOID TENDINITIS OF LEFT SHOULDER: ICD-10-CM

## 2021-02-15 DIAGNOSIS — M77.11 LATERAL EPICONDYLITIS OF RIGHT ELBOW: ICD-10-CM

## 2021-02-15 DIAGNOSIS — M25.512 ACUTE PAIN OF LEFT SHOULDER: ICD-10-CM

## 2021-02-15 DIAGNOSIS — M75.82 TENDINITIS OF LEFT ROTATOR CUFF: Primary | ICD-10-CM

## 2021-02-15 DIAGNOSIS — Z12.31 BREAST CANCER SCREENING BY MAMMOGRAM: ICD-10-CM

## 2021-02-15 PROCEDURE — 20551 NJX 1 TENDON ORIGIN/INSJ: CPT | Performed by: ORTHOPAEDIC SURGERY

## 2021-02-15 PROCEDURE — 3078F DIAST BP <80 MM HG: CPT | Performed by: ORTHOPAEDIC SURGERY

## 2021-02-15 PROCEDURE — 76830 TRANSVAGINAL US NON-OB: CPT | Performed by: OBSTETRICS & GYNECOLOGY

## 2021-02-15 PROCEDURE — 77067 SCR MAMMO BI INCL CAD: CPT | Performed by: INTERNAL MEDICINE

## 2021-02-15 PROCEDURE — 3008F BODY MASS INDEX DOCD: CPT | Performed by: ORTHOPAEDIC SURGERY

## 2021-02-15 PROCEDURE — 73030 X-RAY EXAM OF SHOULDER: CPT | Performed by: ORTHOPAEDIC SURGERY

## 2021-02-15 PROCEDURE — 3074F SYST BP LT 130 MM HG: CPT | Performed by: ORTHOPAEDIC SURGERY

## 2021-02-15 PROCEDURE — 20610 DRAIN/INJ JOINT/BURSA W/O US: CPT | Performed by: ORTHOPAEDIC SURGERY

## 2021-02-15 PROCEDURE — 77063 BREAST TOMOSYNTHESIS BI: CPT | Performed by: INTERNAL MEDICINE

## 2021-02-15 PROCEDURE — 76856 US EXAM PELVIC COMPLETE: CPT | Performed by: OBSTETRICS & GYNECOLOGY

## 2021-02-15 PROCEDURE — 99244 OFF/OP CNSLTJ NEW/EST MOD 40: CPT | Performed by: ORTHOPAEDIC SURGERY

## 2021-02-15 RX ORDER — TRIAMCINOLONE ACETONIDE 40 MG/ML
40 INJECTION, SUSPENSION INTRA-ARTICULAR; INTRAMUSCULAR ONCE
Status: COMPLETED | OUTPATIENT
Start: 2021-02-15 | End: 2021-02-15

## 2021-02-15 RX ORDER — TRIAMCINOLONE ACETONIDE 40 MG/ML
20 INJECTION, SUSPENSION INTRA-ARTICULAR; INTRAMUSCULAR ONCE
Status: COMPLETED | OUTPATIENT
Start: 2021-02-15 | End: 2021-02-15

## 2021-02-15 RX ADMIN — TRIAMCINOLONE ACETONIDE 40 MG: 40 INJECTION, SUSPENSION INTRA-ARTICULAR; INTRAMUSCULAR at 12:00:00

## 2021-02-15 RX ADMIN — TRIAMCINOLONE ACETONIDE 20 MG: 40 INJECTION, SUSPENSION INTRA-ARTICULAR; INTRAMUSCULAR at 12:00:00

## 2021-02-15 NOTE — TELEPHONE ENCOUNTER
Please give her the first available 10 min appt after the 18th to discuss US and plan further management. 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

## 2021-02-15 NOTE — TELEPHONE ENCOUNTER
Pt had her ultrasound moved to tomorrow on 2/16. Pt made an appt for 3/3 to discuss ultrasound results. Pt states that she has had bleeding for 15 days. On 2/12 it started to slow down and went to spotting and continues with spotting, dark brown. The next available spot, 20 minute, New OB Slot. Could we use that slot for 10 minutes? Otherwise there is not appt until 3/3. There are ob slots, 20 minute. Could you add pt to your schedule somewhere? Pt states she would like to see you after the ultrasound is done. Sent to CAP for recs.

## 2021-02-15 NOTE — TELEPHONE ENCOUNTER
Pt states she had her ultrasound done today now. Pt would like an earlier appt than 3/3. She wants to know if CAP could add her sooner and get do a telephone or video call to get her results and discuss them. Sent to CAP for this message and message below.

## 2021-02-15 NOTE — H&P
NURSING INTAKE COMMENTS: Patient presents with:  Shoulder Pain: left shoulder pain with ROM pain can reach a 0/42 at times with certain movements       HPI: This 50year old right-handed female presents today with complaints of left shoulder pain for about Right 1999   • NEPHRECTOMY     • OTHER  2015    UFE   • PRIOR MYOMECTOMY     • UPPER GI ENDOSCOPY,EXAM     • UTERINE FIBROID EMBOLIZATION PERQ W/RAD GID       Current Outpatient Medications   Medication Sig Dispense Refill   • spironolactone 50 MG Oral Tab degeneration Neg      No family Hx of DVT/PE    Social History    Occupational History      Not on file    Tobacco Use      Smoking status: Never Smoker      Smokeless tobacco: Never Used    Substance and Sexual Activity      Alcohol use: No        Alcohol pinpoint tenderness on the lateral epicondyle increased pain with . Neurological: She denied any neck pain or radicular type pain. She had 5/5 strength with thumb extension, abduction fingers,  strength to the left hand.     Imagin view x-ra recommend repeat cortisone direction of the lateral epicondylitis. Risks, benefits, and alternatives were discussed. We discussed bleaching of the skin. Patient understands gave verbal and written consent.   Half cc of Kenalog and 1 cc of bupivacaine was

## 2021-02-15 NOTE — TELEPHONE ENCOUNTER
Patient would like to have an earlier appointment than 3/3. She was able to get her ultrasound done today. Says she spoke to a nurse and that was the soonest they offered but would like to know if there was anyway she could even get a telephone visit or video visit to get her results.

## 2021-03-03 ENCOUNTER — OFFICE VISIT (OUTPATIENT)
Dept: OBGYN CLINIC | Facility: CLINIC | Age: 56
End: 2021-03-03
Payer: COMMERCIAL

## 2021-03-03 VITALS — SYSTOLIC BLOOD PRESSURE: 127 MMHG | HEART RATE: 97 BPM | DIASTOLIC BLOOD PRESSURE: 89 MMHG

## 2021-03-03 DIAGNOSIS — N95.0 POST-MENOPAUSAL BLEEDING: Primary | ICD-10-CM

## 2021-03-03 DIAGNOSIS — D25.1 INTRAMURAL LEIOMYOMA OF UTERUS: ICD-10-CM

## 2021-03-03 PROCEDURE — 99212 OFFICE O/P EST SF 10 MIN: CPT | Performed by: OBSTETRICS & GYNECOLOGY

## 2021-03-03 PROCEDURE — 3079F DIAST BP 80-89 MM HG: CPT | Performed by: OBSTETRICS & GYNECOLOGY

## 2021-03-03 PROCEDURE — 3074F SYST BP LT 130 MM HG: CPT | Performed by: OBSTETRICS & GYNECOLOGY

## 2021-03-03 NOTE — PROGRESS NOTES
Yandy Gomez    8/18/1965       Patient presents with: Follow - Up: U/S RESULTS, STARTED BLEEDING TODAY   Patient is here to follow-up her ultrasound results as well as her endometrial biopsy.   She had one episode of perimenopausal bleeding for wh this.    CONCLUSION: pelvic US 2/25/2021  1. Anteverted uterus demonstrates multiple intramural and subserosal fibroids, the largest of which again appear calcified.   Most of these fibroids are grossly unchanged, although on calcified fibroid in the poster Abnormal Pap: Yes  Pap Date: 09/19/20  Pap Result Notes: PAP NEG. HPV NEG  Follow Up Recommendation: Mammo 2/15/21 Benign      PHYSICAL EXAM:       Constitutional: well developed, well nourished    Psychiatric:  Oriented to time, place, person and situation

## 2021-03-06 ENCOUNTER — TELEPHONE (OUTPATIENT)
Dept: OBGYN CLINIC | Facility: CLINIC | Age: 56
End: 2021-03-06

## 2021-03-06 NOTE — TELEPHONE ENCOUNTER
Please call and let Candida Ambrose know that I am still trying to speak to another MD regarding her case and that I will call her as soon as I have considered the advice and recs.

## 2021-03-11 ENCOUNTER — OFFICE VISIT (OUTPATIENT)
Dept: OBGYN CLINIC | Facility: CLINIC | Age: 56
End: 2021-03-11
Payer: COMMERCIAL

## 2021-03-11 VITALS — BODY MASS INDEX: 36 KG/M2 | SYSTOLIC BLOOD PRESSURE: 122 MMHG | WEIGHT: 242 LBS | DIASTOLIC BLOOD PRESSURE: 84 MMHG

## 2021-03-11 DIAGNOSIS — Z78.0 MENOPAUSE: Primary | ICD-10-CM

## 2021-03-11 DIAGNOSIS — N95.0 POST-MENOPAUSAL BLEEDING: ICD-10-CM

## 2021-03-11 DIAGNOSIS — D25.9 UTERINE LEIOMYOMA, UNSPECIFIED LOCATION: ICD-10-CM

## 2021-03-11 PROCEDURE — 3079F DIAST BP 80-89 MM HG: CPT | Performed by: OBSTETRICS & GYNECOLOGY

## 2021-03-11 PROCEDURE — 3074F SYST BP LT 130 MM HG: CPT | Performed by: OBSTETRICS & GYNECOLOGY

## 2021-03-11 PROCEDURE — 99214 OFFICE O/P EST MOD 30 MIN: CPT | Performed by: OBSTETRICS & GYNECOLOGY

## 2021-03-11 NOTE — PROGRESS NOTES
HPI/Subjective:   Patient ID: Mckenna Rater is a 50year old female. Patient here for second opinion about her postmenopausal bleeding.   Patient desires definitive treatment at this point due to the irregular bleeding that has been ongoing and also Propionate 50 MCG/ACT Nasal Suspension INSTILL 1 SPRAY INTO EACH NOSTRIL TWICE A DAY 3 Bottle 1   • mupirocin 2 % External Ointment Apply to nares twice daily for 14 days 15 g 0   • Albuterol Sulfate HFA (PROAIR HFA) 108 (90 Base) MCG/ACT Inhalation Aero S

## 2021-03-12 ENCOUNTER — LAB ENCOUNTER (OUTPATIENT)
Dept: LAB | Facility: HOSPITAL | Age: 56
End: 2021-03-12
Attending: OBSTETRICS & GYNECOLOGY
Payer: COMMERCIAL

## 2021-03-12 DIAGNOSIS — Z78.0 MENOPAUSE: ICD-10-CM

## 2021-03-12 LAB
FSH SERPL-ACNC: 79 MIU/ML
LH SERPL-ACNC: 52.1 MIU/ML

## 2021-03-12 PROCEDURE — 83001 ASSAY OF GONADOTROPIN (FSH): CPT

## 2021-03-12 PROCEDURE — 83002 ASSAY OF GONADOTROPIN (LH): CPT

## 2021-03-12 PROCEDURE — 36415 COLL VENOUS BLD VENIPUNCTURE: CPT

## 2021-03-15 ENCOUNTER — NURSE TRIAGE (OUTPATIENT)
Dept: INTERNAL MEDICINE CLINIC | Facility: CLINIC | Age: 56
End: 2021-03-15

## 2021-03-15 NOTE — TELEPHONE ENCOUNTER
----- Message from Bib Ibarra sent at 3/15/2021  2:47 PM CDT -----  Regarding: Other  Contact: [phone]  I have been  waking up with such bad Tonny horse, cramps  in my calls for the ladt 2 weeks. I have been eating bananas with no relief.

## 2021-03-16 ENCOUNTER — LAB ENCOUNTER (OUTPATIENT)
Dept: LAB | Facility: HOSPITAL | Age: 56
End: 2021-03-16
Attending: INTERNAL MEDICINE
Payer: COMMERCIAL

## 2021-03-16 DIAGNOSIS — R25.2 LEG CRAMPS: ICD-10-CM

## 2021-03-16 LAB
ANION GAP SERPL CALC-SCNC: 6 MMOL/L (ref 0–18)
BUN BLD-MCNC: 18 MG/DL (ref 7–18)
BUN/CREAT SERPL: 14.2 (ref 10–20)
CALCIUM BLD-MCNC: 9.1 MG/DL (ref 8.5–10.1)
CHLORIDE SERPL-SCNC: 108 MMOL/L (ref 98–112)
CO2 SERPL-SCNC: 29 MMOL/L (ref 21–32)
CREAT BLD-MCNC: 1.27 MG/DL
GLUCOSE BLD-MCNC: 90 MG/DL (ref 70–99)
HAV IGM SER QL: 2.3 MG/DL (ref 1.6–2.6)
OSMOLALITY SERPL CALC.SUM OF ELEC: 297 MOSM/KG (ref 275–295)
PATIENT FASTING Y/N/NP: YES
POTASSIUM SERPL-SCNC: 4 MMOL/L (ref 3.5–5.1)
SODIUM SERPL-SCNC: 143 MMOL/L (ref 136–145)

## 2021-03-16 PROCEDURE — 83735 ASSAY OF MAGNESIUM: CPT

## 2021-03-16 PROCEDURE — 36415 COLL VENOUS BLD VENIPUNCTURE: CPT

## 2021-03-16 PROCEDURE — 80048 BASIC METABOLIC PNL TOTAL CA: CPT

## 2021-03-16 NOTE — TELEPHONE ENCOUNTER
Dr. Penelope Meraz: please review mychart. C/o \"jacky horses\" daily  in left leg, and the other night had right leg. Only at night. Denied palpitations. No sob. No chest pain. Stopped phentermine for a week. Didn't change symptoms.    Patient is o

## 2021-03-16 NOTE — TELEPHONE ENCOUNTER
isango! message sent to pt:      Dear Pratik Melissa,  I am sorry to hear about your cramps. Yes, please schedule an office visit. I will order blood tests also.    LVetrone

## 2021-03-16 NOTE — TELEPHONE ENCOUNTER
IVANI- pt viewed Premise message.        Read Composed From To  Subject   Y 3/15/2021  7:51 PM MD Lucrecia Colvin  RE: Other

## 2021-03-17 ENCOUNTER — OFFICE VISIT (OUTPATIENT)
Dept: OBGYN CLINIC | Facility: CLINIC | Age: 56
End: 2021-03-17
Payer: COMMERCIAL

## 2021-03-17 VITALS — DIASTOLIC BLOOD PRESSURE: 81 MMHG | SYSTOLIC BLOOD PRESSURE: 118 MMHG

## 2021-03-17 DIAGNOSIS — Z87.42 HISTORY OF ABNORMAL CERVICAL PAP SMEAR: ICD-10-CM

## 2021-03-17 DIAGNOSIS — N95.0 POST-MENOPAUSAL BLEEDING: Primary | ICD-10-CM

## 2021-03-17 DIAGNOSIS — D25.9 UTERINE LEIOMYOMA, UNSPECIFIED LOCATION: ICD-10-CM

## 2021-03-17 PROCEDURE — 3079F DIAST BP 80-89 MM HG: CPT | Performed by: OBSTETRICS & GYNECOLOGY

## 2021-03-17 PROCEDURE — 99214 OFFICE O/P EST MOD 30 MIN: CPT | Performed by: OBSTETRICS & GYNECOLOGY

## 2021-03-17 PROCEDURE — 3074F SYST BP LT 130 MM HG: CPT | Performed by: OBSTETRICS & GYNECOLOGY

## 2021-03-17 RX ORDER — MEDROXYPROGESTERONE ACETATE 10 MG/1
10 TABLET ORAL DAILY
Qty: 90 TABLET | Refills: 0 | Status: SHIPPED | OUTPATIENT
Start: 2021-03-17 | End: 2021-06-15

## 2021-03-17 NOTE — PROGRESS NOTES
[facility], [facility]  Obstetrics and Gynecology  Focused Gynecology Problem Exam  MD Devon Callowayderrick Mendoza is a 50year old female presenting for Consult (for Robotic Hysterectomy, pt was referred by Dr. Shakira Irene. New Pt)  .     HPI:   Sayra Sawyer TIMES A DAY AS NEEDED     • Phentermine HCl 15 MG Oral Cap Take 1 capsule (15 mg total) by mouth every morning. 30 capsule 0   • topiramate 25 MG Oral Tab Take 1 tablet (25 mg total) by mouth every evening.  30 tablet 2   • TRULANCE 3 MG Oral Tab TAKE 1 TAB 09/2017    on abx therapy   • High blood pressure    • History of blood transfusion     2013 at the time of myomectomy.    • IBS (irritable bowel syndrome)     constipation   • Kidney donor     right   • Pulmonary embolism ([facility] [address] 2013    Bilateral PE, sympto fibroid in the posterior uterine body is slightly larger measuring 9 x 7 x 9 mm on the prior ultrasound.  A 1.8 x 1.6 x 1.6 cm intramural fibroid in the posterior uterine body is grossly unchanged given   differences in interobserver variability. Lou Pratt is Uterus: Bulky, irregular shaped and minimally tender uterus measuring approximately 10 weeks size                     Cervix: Normal with no significant cervical motion tenderness.                      Adnexa: non tender, no masses, normal size     AS patient that I would not recommend hysterectomy for prevention of cervical cancer in her case and rarely what I offer it in general for cervical dysplasia as we have less invasive treatment options of which she does not need at this time.   I counseled mira

## 2021-03-18 ENCOUNTER — OFFICE VISIT (OUTPATIENT)
Dept: INTERNAL MEDICINE CLINIC | Facility: CLINIC | Age: 56
End: 2021-03-18
Payer: COMMERCIAL

## 2021-03-18 VITALS — DIASTOLIC BLOOD PRESSURE: 87 MMHG | SYSTOLIC BLOOD PRESSURE: 129 MMHG | HEART RATE: 102 BPM

## 2021-03-18 DIAGNOSIS — I10 ESSENTIAL HYPERTENSION: ICD-10-CM

## 2021-03-18 DIAGNOSIS — M79.605 PAIN IN BOTH LOWER EXTREMITIES: Primary | ICD-10-CM

## 2021-03-18 DIAGNOSIS — M79.604 PAIN IN BOTH LOWER EXTREMITIES: Primary | ICD-10-CM

## 2021-03-18 PROCEDURE — 99214 OFFICE O/P EST MOD 30 MIN: CPT | Performed by: INTERNAL MEDICINE

## 2021-03-18 PROCEDURE — 3074F SYST BP LT 130 MM HG: CPT | Performed by: INTERNAL MEDICINE

## 2021-03-18 PROCEDURE — 3079F DIAST BP 80-89 MM HG: CPT | Performed by: INTERNAL MEDICINE

## 2021-03-18 NOTE — PROGRESS NOTES
HPI:    Patient ID: Chidi Holly is a 50year old female. HPI:  Pt c/o leg cramps for the past 2 weeks. They wake her from sleep and can be severe. They last a few minutes. It is mostly in her left calf.     Past Surgical History:   Procedure Lat 160-4.5 MCG/ACT Inhalation Aerosol Inhale 2 puffs into the lungs 2 (two) times daily. 1 Inhaler 5   • albuterol sulfate (2.5 MG/3ML) 0.083% Inhalation Nebu Soln Take 3 mL (2.5 mg total) by nebulization every 4 (four) hours as needed for Wheezing.  50 ampule sounds: Normal heart sounds. No murmur heard. Pulmonary:      Effort: Pulmonary effort is normal. No respiratory distress. Breath sounds: Normal breath sounds. No wheezing or rales. Musculoskeletal:      Right lower leg: Edema (trace) present.

## 2021-03-19 NOTE — ASSESSMENT & PLAN NOTE
Patient has been getting pain in bilateral lower extremities in the calf area. There is no swelling. This is likely muscular. We will check a lower extremity Doppler to rule out blood clot. Advised vitamin B6 100 mg twice daily.   Follow-up in 3 to 4 we

## 2021-03-23 ENCOUNTER — PATIENT MESSAGE (OUTPATIENT)
Dept: PULMONOLOGY | Facility: CLINIC | Age: 56
End: 2021-03-23

## 2021-03-23 ENCOUNTER — ORDER TRANSCRIPTION (OUTPATIENT)
Dept: ADMINISTRATIVE | Facility: HOSPITAL | Age: 56
End: 2021-03-23

## 2021-03-23 DIAGNOSIS — Z11.59 ENCOUNTER FOR SCREENING FOR OTHER VIRAL DISEASES: ICD-10-CM

## 2021-03-23 DIAGNOSIS — Z01.818 PRE-PROCEDURAL EXAMINATION: Primary | ICD-10-CM

## 2021-03-23 NOTE — TELEPHONE ENCOUNTER
From: Monica Sims  To: Margie Hernandez.  Randall Jimenez MD  Sent: 3/23/2021 3:09 PM CDT  Subject: Other    Hello I have my follow up appointment with you first week in April and you suggested that I get a PFT but I don't see an order please advise

## 2021-03-26 ENCOUNTER — OFFICE VISIT (OUTPATIENT)
Dept: GASTROENTEROLOGY | Facility: CLINIC | Age: 56
End: 2021-03-26
Payer: COMMERCIAL

## 2021-03-26 VITALS — DIASTOLIC BLOOD PRESSURE: 83 MMHG | SYSTOLIC BLOOD PRESSURE: 131 MMHG | HEART RATE: 98 BPM

## 2021-03-26 DIAGNOSIS — K58.1 IRRITABLE BOWEL SYNDROME WITH CONSTIPATION: Primary | ICD-10-CM

## 2021-03-26 PROCEDURE — 3079F DIAST BP 80-89 MM HG: CPT | Performed by: INTERNAL MEDICINE

## 2021-03-26 PROCEDURE — 3075F SYST BP GE 130 - 139MM HG: CPT | Performed by: INTERNAL MEDICINE

## 2021-03-26 PROCEDURE — 99213 OFFICE O/P EST LOW 20 MIN: CPT | Performed by: INTERNAL MEDICINE

## 2021-03-26 RX ORDER — LINACLOTIDE 145 UG/1
1 CAPSULE, GELATIN COATED ORAL DAILY
Qty: 30 CAPSULE | Refills: 0 | Status: SHIPPED | OUTPATIENT
Start: 2021-03-26 | End: 2021-05-18

## 2021-03-26 RX ORDER — POLYETHYLENE GLYCOL 3350, SODIUM CHLORIDE, SODIUM BICARBONATE, POTASSIUM CHLORIDE 420; 11.2; 5.72; 1.48 G/4L; G/4L; G/4L; G/4L
POWDER, FOR SOLUTION ORAL
Qty: 1 BOTTLE | Refills: 0 | Status: SHIPPED | OUTPATIENT
Start: 2021-03-26 | End: 2021-04-27

## 2021-03-26 NOTE — PATIENT INSTRUCTIONS
1. Restart Linzess 145mcg/day  2. Will send letter to insurance company re: switching back to Linzess   3. Washout with Trilyte    --------------    WASHOUT INSTRUCTIONS:  1. Pick a day you will be at home, close to a toilet.   2. Have a some juice for erum

## 2021-03-26 NOTE — PROGRESS NOTES
[facility] Follow-up Visit    Lesly family at home   - Occupation: RN admit at [employer]      Endoscopy Hx:  - EGD with Dr. Anne Lara September 2017 per above  -C-scope 2019 - tubular adenoma, repeat in 5 years  - C-scope per Chart Review (3/2011)- hemorrhoids              History, Medic History: Social History    Tobacco Use      Smoking status: Never Smoker      Smokeless tobacco: Never Used    Vaping Use      Vaping Use: Never used    Alcohol use: No      Alcohol/week: 0.0 standard drinks    Drug use: Never       Medications (Active rozina for fevers, chills  EYES:  negative for change in vision  RESPIRATORY:  negative for  shortness of breath  CARDIOVASCULAR:  negative for  chest pain  GASTROINTESTINAL:  see HPI  GENITOURINARY:  negative for dysuria  INTEGUMENT/BREAST:  SKIN:  negative for bloating issues. She wishes she could go back on the Linzess tablets. TSH wnl in the past.     #Screening/Hx of tubular adenoma (2019) - repeat CLN in 2024      Recommend:  1. Restart Linzess 145mcg/day  2.  Will send letter to insurance company re: switch

## 2021-03-30 ENCOUNTER — HOSPITAL ENCOUNTER (OUTPATIENT)
Dept: ULTRASOUND IMAGING | Facility: HOSPITAL | Age: 56
Discharge: HOME | End: 2021-03-30
Attending: INTERNAL MEDICINE
Payer: COMMERCIAL

## 2021-03-30 ENCOUNTER — LAB ENCOUNTER (OUTPATIENT)
Dept: LAB | Facility: HOSPITAL | Age: 56
End: 2021-03-30
Attending: INTERNAL MEDICINE
Payer: COMMERCIAL

## 2021-03-30 DIAGNOSIS — M79.605 PAIN IN BOTH LOWER EXTREMITIES: ICD-10-CM

## 2021-03-30 DIAGNOSIS — M79.604 PAIN IN BOTH LOWER EXTREMITIES: ICD-10-CM

## 2021-03-30 DIAGNOSIS — Z11.59 ENCOUNTER FOR SCREENING FOR OTHER VIRAL DISEASES: ICD-10-CM

## 2021-03-30 DIAGNOSIS — Z01.818 PRE-PROCEDURAL EXAMINATION: ICD-10-CM

## 2021-03-30 PROCEDURE — 93970 EXTREMITY STUDY: CPT | Performed by: INTERNAL MEDICINE

## 2021-04-01 ENCOUNTER — TELEPHONE (OUTPATIENT)
Dept: GASTROENTEROLOGY | Facility: CLINIC | Age: 56
End: 2021-04-01

## 2021-04-01 NOTE — TELEPHONE ENCOUNTER
Dr. Pat Wilson requesting 90 day supply of Linzess instead. Please review and sign below pended prescription if appropriate. Thank you    I submitted PA request for linzess today through covermymeds. com along with the letter generated by Dr. Haddad Speak

## 2021-04-01 NOTE — TELEPHONE ENCOUNTER
Pharmacy called to request 3 mos supply:       Current Outpatient Medications:   •  linaCLOtide (LINZESS) 145 MCG Oral Cap, Take 1 capsule by mouth daily. , Disp: 30 capsule, Rfl: 0    Pharmacy states that this might need prior auth because 30 days supply d

## 2021-04-02 ENCOUNTER — HOSPITAL ENCOUNTER (OUTPATIENT)
Dept: RESPIRATORY THERAPY | Facility: HOSPITAL | Age: 56
Discharge: HOME | End: 2021-04-02
Attending: INTERNAL MEDICINE
Payer: COMMERCIAL

## 2021-04-02 DIAGNOSIS — J45.41 MODERATE PERSISTENT ASTHMA WITH ACUTE EXACERBATION: ICD-10-CM

## 2021-04-02 PROCEDURE — 94729 DIFFUSING CAPACITY: CPT | Performed by: INTERNAL MEDICINE

## 2021-04-02 PROCEDURE — 94060 EVALUATION OF WHEEZING: CPT | Performed by: INTERNAL MEDICINE

## 2021-04-02 PROCEDURE — 94726 PLETHYSMOGRAPHY LUNG VOLUMES: CPT | Performed by: INTERNAL MEDICINE

## 2021-04-02 NOTE — TELEPHONE ENCOUNTER
Evelyn/ called for additional clinical info for pt. Please call. She will also send a fax to this office.

## 2021-04-02 NOTE — TELEPHONE ENCOUNTER
I called back at [phone] and spoke to representative Georgie who transferred me back to the main menu. I then spoke to [facility].   I reviewed clinicals over the phone and medications that have been tried and failed even though this is thoroughly do

## 2021-04-05 ENCOUNTER — OFFICE VISIT (OUTPATIENT)
Dept: PULMONOLOGY | Facility: CLINIC | Age: 56
End: 2021-04-05
Payer: COMMERCIAL

## 2021-04-05 VITALS
DIASTOLIC BLOOD PRESSURE: 82 MMHG | OXYGEN SATURATION: 100 % | RESPIRATION RATE: 18 BRPM | HEART RATE: 98 BPM | SYSTOLIC BLOOD PRESSURE: 121 MMHG

## 2021-04-05 DIAGNOSIS — J45.30 MILD PERSISTENT ASTHMA WITHOUT COMPLICATION: Primary | ICD-10-CM

## 2021-04-05 PROCEDURE — 3074F SYST BP LT 130 MM HG: CPT | Performed by: INTERNAL MEDICINE

## 2021-04-05 PROCEDURE — 99213 OFFICE O/P EST LOW 20 MIN: CPT | Performed by: INTERNAL MEDICINE

## 2021-04-05 PROCEDURE — 3079F DIAST BP 80-89 MM HG: CPT | Performed by: INTERNAL MEDICINE

## 2021-04-05 NOTE — ADDENDUM NOTE
Encounter addended by: Lor Britt MD on: 4/5/2021 3:52 PM   Actions taken: Clinical Note Signed, Charge Capture section accepted

## 2021-04-05 NOTE — PROCEDURES
[facility]    Patient's Name Michael Oneill MRN [id number]    1965 Pulmonologist Ari Olmos MD   Location [facility] PCP Bill Thurston MD     IMPRESSION:    The PFTs are Abnormal.    There is no a

## 2021-04-05 NOTE — PROGRESS NOTES
HPI/Subjective:   Patient ID: Per Fuentes is a 50year old female.     HPI    Asthma symptoms controlled and not using albuterol at all  No night symptoms  Doing very well  No cough or wheezes      History/Other:   Review of Systems   Constitutional: 4/5/2021 ) 15 g 0     Allergies:  Amoxicillin             RASH  Bactrim [Sulfametho*    HIVES  Flagyl [Metronidazo*    HIVES  Flu Virus Vaccine           Comment:Respiratory Problems    Objective:   Physical Exam  Constitutional:       Appearance: Normal a

## 2021-04-07 RX ORDER — LINACLOTIDE 145 UG/1
1 CAPSULE, GELATIN COATED ORAL DAILY
Qty: 90 CAPSULE | Refills: 0 | Status: SHIPPED | OUTPATIENT
Start: 2021-04-07 | End: 2021-04-27

## 2021-04-07 NOTE — TELEPHONE ENCOUNTER
Dr. Kingston Ibrahim,    The prior authorization for the Kenny Thomas was denied. The reason is the patient must have tried and had poor response to two formulary alternative drugs. They are aware patient has tried and failed Trulance.  This means patient must try and

## 2021-04-09 ENCOUNTER — OFFICE VISIT (OUTPATIENT)
Dept: SURGERY | Facility: CLINIC | Age: 56
End: 2021-04-09
Payer: COMMERCIAL

## 2021-04-09 VITALS
OXYGEN SATURATION: 100 % | WEIGHT: 226 LBS | DIASTOLIC BLOOD PRESSURE: 91 MMHG | BODY MASS INDEX: 33.47 KG/M2 | HEART RATE: 89 BPM | HEIGHT: 69 IN | SYSTOLIC BLOOD PRESSURE: 125 MMHG

## 2021-04-09 DIAGNOSIS — I10 ESSENTIAL HYPERTENSION: Primary | ICD-10-CM

## 2021-04-09 DIAGNOSIS — Z51.81 ENCOUNTER FOR THERAPEUTIC DRUG MONITORING: ICD-10-CM

## 2021-04-09 DIAGNOSIS — E66.9 OBESITY (BMI 30-39.9): ICD-10-CM

## 2021-04-09 PROCEDURE — 3008F BODY MASS INDEX DOCD: CPT | Performed by: INTERNAL MEDICINE

## 2021-04-09 PROCEDURE — 3080F DIAST BP >= 90 MM HG: CPT | Performed by: INTERNAL MEDICINE

## 2021-04-09 PROCEDURE — 99214 OFFICE O/P EST MOD 30 MIN: CPT | Performed by: INTERNAL MEDICINE

## 2021-04-09 PROCEDURE — 3074F SYST BP LT 130 MM HG: CPT | Performed by: INTERNAL MEDICINE

## 2021-04-09 RX ORDER — TOPIRAMATE 25 MG/1
25 TABLET ORAL EVERY EVENING
Qty: 90 TABLET | Refills: 1 | Status: SHIPPED | OUTPATIENT
Start: 2021-04-09 | End: 2021-12-16

## 2021-04-09 RX ORDER — PHENTERMINE HYDROCHLORIDE 30 MG/1
30 CAPSULE ORAL EVERY MORNING
Qty: 30 CAPSULE | Refills: 2 | Status: SHIPPED | OUTPATIENT
Start: 2021-04-09 | End: 2021-10-20

## 2021-04-09 NOTE — PROGRESS NOTES
[address] [place] [address],[address]  Dept: [phone]     Patient:  Ade Pruitt  :      1965  MRN:      [id number]    Chief Complaint:  Patient presents w gastro office. May substitute with Trilyte/generic equivalent if needed. (Patient not taking: Reported on 4/5/2021 ) 1 Bottle 0   • medroxyPROGESTERone Acetate 10 MG Oral Tab Take 1 tablet (10 mg total) by mouth daily.  90 tablet 0   • spironolactone 50 MG on a farm: Not Asked        History of tanning: Not Asked        Outdoor occupation: Not Asked        Pt has a pacemaker: No        Pt has a defibrillator: No        Breast feeding: Not Asked        Reaction to local anesthetic: No    Social History Narrat cervical   • Hypertension Sister    • Heart Disorder Sister    • Hypertension Brother    • Cancer Sister         cervical   • Hypertension Sister    • Macular degeneration Neg        Food Journal  · Reviewed and Discussed:       · Patient has a Food Negative. Negative for back pain and gait problem. Skin: Negative. Neurological: Negative. Negative for dizziness and headaches. Psychiatric/Behavioral: Negative. Physical Exam:   Physical Exam  Vitals reviewed.    Constitutional:       Gene

## 2021-04-13 ENCOUNTER — PATIENT MESSAGE (OUTPATIENT)
Dept: GASTROENTEROLOGY | Facility: CLINIC | Age: 56
End: 2021-04-13

## 2021-04-13 NOTE — TELEPHONE ENCOUNTER
Dr. Matilda Lee,    Please advise on message below. I sent Jocelyn Valle your previous [employer] message as well since she hadn't read it. Thank you.

## 2021-04-13 NOTE — TELEPHONE ENCOUNTER
From: Denis Mendoza  To: Jennifer Basurto MD  Sent: 4/13/2021 3:50 PM CDT  Subject: Prescription Question    Hello Dr. Ivonne Alexandra the insurance company is denying my request for Viola Genta because they want me to try more than one medication.  The pharmacist do

## 2021-04-14 RX ORDER — LACTULOSE 20 G/30ML
20 SOLUTION ORAL 2 TIMES DAILY PRN
Qty: 1 BOTTLE | Refills: 0 | Status: SHIPPED | OUTPATIENT
Start: 2021-04-14 | End: 2021-04-27

## 2021-04-27 ENCOUNTER — OFFICE VISIT (OUTPATIENT)
Dept: INTERNAL MEDICINE CLINIC | Facility: CLINIC | Age: 56
End: 2021-04-27
Payer: COMMERCIAL

## 2021-04-27 VITALS
SYSTOLIC BLOOD PRESSURE: 128 MMHG | RESPIRATION RATE: 18 BRPM | BODY MASS INDEX: 33 KG/M2 | HEIGHT: 69 IN | DIASTOLIC BLOOD PRESSURE: 86 MMHG | HEART RATE: 91 BPM

## 2021-04-27 DIAGNOSIS — Z23 NEED FOR VACCINATION: ICD-10-CM

## 2021-04-27 DIAGNOSIS — I10 ESSENTIAL HYPERTENSION: ICD-10-CM

## 2021-04-27 DIAGNOSIS — M79.605 PAIN IN BOTH LOWER EXTREMITIES: ICD-10-CM

## 2021-04-27 DIAGNOSIS — M79.604 PAIN IN BOTH LOWER EXTREMITIES: ICD-10-CM

## 2021-04-27 DIAGNOSIS — J45.20 MILD INTERMITTENT ASTHMA WITHOUT COMPLICATION: Primary | ICD-10-CM

## 2021-04-27 DIAGNOSIS — N95.0 POST-MENOPAUSAL BLEEDING: ICD-10-CM

## 2021-04-27 PROBLEM — R60.0 LOWER EXTREMITY EDEMA: Status: RESOLVED | Noted: 2019-11-14 | Resolved: 2021-04-27

## 2021-04-27 PROCEDURE — 3008F BODY MASS INDEX DOCD: CPT | Performed by: INTERNAL MEDICINE

## 2021-04-27 PROCEDURE — 3074F SYST BP LT 130 MM HG: CPT | Performed by: INTERNAL MEDICINE

## 2021-04-27 PROCEDURE — 99214 OFFICE O/P EST MOD 30 MIN: CPT | Performed by: INTERNAL MEDICINE

## 2021-04-27 PROCEDURE — 3079F DIAST BP 80-89 MM HG: CPT | Performed by: INTERNAL MEDICINE

## 2021-04-27 NOTE — PROGRESS NOTES
HPI:    Patient ID: Scott Kimball is a 50year old female. HPI:  Her leg cramps are almost gone. She has lost weight and feels better. She is exercising. She was weaned off of the symbicort. She saw pulmonary and did PFTs.   She is still having MG/3ML) 0.083% Inhalation Nebu Soln Take 3 mL (2.5 mg total) by nebulization every 4 (four) hours as needed for Wheezing.  50 ampule 3   • Albuterol Sulfate HFA (PROAIR HFA) 108 (90 Base) MCG/ACT Inhalation Aero Soln Inhale 2 puffs into the lungs every 4 (f Unprioritized    Essential hypertension     Controlled. Continue present management. Mild intermittent asthma without complication - Primary     Controlled. She is weaning off of the inhalers. F/u with pulmonary.          Post-menopausal blee

## 2021-04-28 NOTE — TELEPHONE ENCOUNTER
Dr. Carley Reilly,    Can you please call [employer] [phone] to complete a peer to peer for the patient's Renetta Uptons. Please see patient's Pyroliat message.     The AOR form needed to be signed by the patient and sent with letter however patient never s

## 2021-05-14 NOTE — TELEPHONE ENCOUNTER
Received a call back from [place] with [employer] who states he received a message from clinical team stating the patients plan requires peer to peer be done directly through [employer].     He transferred me to [facility] with [employer] Hayes (#[phone]) who helped

## 2021-05-14 NOTE — TELEPHONE ENCOUNTER
Eliud ARIZA From [employer] contacted me per patient's request regarding peer to peer not being complete. I informed him availability dates/times to complete peer to peer was given by the doctor but no one followed up.     Dr. Alivia Lopez,    Someone fr

## 2021-05-14 NOTE — TELEPHONE ENCOUNTER
Dr. Kingston Ibrahim,    Patient is f/u to see if were able to do the peer to peer for linzess. If not I can call [employer] to schedule a time if you have some dates/times available.

## 2021-05-17 NOTE — TELEPHONE ENCOUNTER
To: [facility]AFF      From: Per Fuentes      Created: 5/17/2021 4:27 PM        Thank you so much you are first on the [place]  list. Can you send over rx for 90 day supply and up the dosage.  Sorry I know I am asking alot but I have been so

## 2021-05-18 RX ORDER — LINACLOTIDE 145 UG/1
1 CAPSULE, GELATIN COATED ORAL DAILY
Qty: 90 CAPSULE | Refills: 1 | Status: SHIPPED | OUTPATIENT
Start: 2021-05-18 | End: 2021-11-10

## 2021-05-24 RX ORDER — BUDESONIDE AND FORMOTEROL FUMARATE DIHYDRATE 160; 4.5 UG/1; UG/1
AEROSOL RESPIRATORY (INHALATION)
Qty: 30.6 INHALER | Refills: 1 | Status: SHIPPED | OUTPATIENT
Start: 2021-05-24

## 2021-05-26 ENCOUNTER — OFFICE VISIT (OUTPATIENT)
Dept: DERMATOLOGY CLINIC | Facility: CLINIC | Age: 56
End: 2021-05-26
Payer: COMMERCIAL

## 2021-05-26 DIAGNOSIS — L56.8 PHOTOSENSITIVITY DERMATITIS DUE TO SUN: Primary | ICD-10-CM

## 2021-05-26 DIAGNOSIS — L25.9 CONTACT DERMATITIS AND ECZEMA: ICD-10-CM

## 2021-05-26 PROCEDURE — 99213 OFFICE O/P EST LOW 20 MIN: CPT | Performed by: DERMATOLOGY

## 2021-05-26 RX ORDER — DESONIDE 0.5 MG/G
CREAM TOPICAL
Qty: 30 G | Refills: 3 | Status: SHIPPED | OUTPATIENT
Start: 2021-05-26

## 2021-05-26 RX ORDER — PLECANATIDE 3 MG/1
TABLET ORAL
COMMUNITY
Start: 2021-04-18 | End: 2021-11-09

## 2021-05-26 RX ORDER — PREDNISONE 10 MG/1
TABLET ORAL
Qty: 30 TABLET | Refills: 2 | Status: SHIPPED | OUTPATIENT
Start: 2021-05-26 | End: 2021-06-07

## 2021-06-01 NOTE — PROGRESS NOTES
Harpal Lee is a 50year old female. Patient presents with:  Derm Problem: LOV 6/28/18. pt presenting today with rash to face,neck and chest since Saturday.  pt states she went on a walk, put on sunscreen prior to walking. c/o itching and burnin mammogram of right breast     Had a biopsy    • Anesthesia complication     difficulty in waking up   • Asthma     exercised induced   • Claustrophobia    • Essential hypertension    • Helicobacter positive gastritis 09/2017    on abx therapy   • High bloo Aero Soln Inhale 2 puffs into the lungs every 4 (four) hours as needed for Wheezing. 1 Inhaler 2   • medroxyPROGESTERone Acetate 10 MG Oral Tab Take 1 tablet (10 mg total) by mouth daily.  (Patient not taking: Reported on 5/26/2021 ) 90 tablet 0   • Nebuliz Use      Vaping Use: Never used    Substance and Sexual Activity      Alcohol use: No        Alcohol/week: 0.0 standard drinks      Drug use: Never      Sexual activity: Not on file    Other Topics      Concerns:        Grew up on a farm: Not Asked states she went on a walk, put on sunscreen prior to walking. c/o itching and burning to face. pt previous took 30mg of Prednisone for 3 days. pt stopped medication becuase of side effects. Patient with history of photodermatitis. No exacerbation.   Hy / plan:    Recurrent episodes of photodermatitis despite careful sun protection. Discussed broad-spectrum mineral-based sunscreens ideally SPF 50 or greater. May need to clear these with other chemical sunscreens.   Possibility of photo contact dermatitis times daily x 3 days, then 1 tab daily x 3 days   • Desonide 0.05 % External Cream 30 g 3     Sig: Use bid to rash       Photosensitivity dermatitis due to sun  (primary encounter diagnosis)  Contact dermatitis and eczema    Orders Placed This Encounter

## 2021-06-05 RX ORDER — MEDROXYPROGESTERONE ACETATE 10 MG/1
TABLET ORAL
Qty: 60 TABLET | Refills: 1 | OUTPATIENT
Start: 2021-06-05

## 2021-06-21 ENCOUNTER — TELEPHONE (OUTPATIENT)
Dept: PULMONOLOGY | Facility: CLINIC | Age: 56
End: 2021-06-21

## 2021-06-21 NOTE — TELEPHONE ENCOUNTER
Dr. Franca Marquez,     Please sign off on form: Re certification FMLA  Same information as previous FMLA, 1-8 flare ups per month  -Highlight the patient and hit \"Chart\" button. -In Chart Review, w/in the Encounter tab - click 1 time on the Telephone call encounter for 6/21/21 Scroll down the telephone encounter.  -Click \"scan on\" blue Hyperlink under \"Media\" heading for FMLA Dr Franca Marquez 6/21/21  w/in the telephone enc.  -Click on Acknowledge button at the bottom right corner and left-click onto image, signature stamp appears and drag signature to Provider signature line. Stamp will turn blue. Close window.      Thank you,    Dora Romero Report given to HELEN Hannah

## 2021-06-23 ENCOUNTER — TELEPHONE (OUTPATIENT)
Dept: INTERNAL MEDICINE CLINIC | Facility: CLINIC | Age: 56
End: 2021-06-23

## 2021-06-23 NOTE — TELEPHONE ENCOUNTER
Dr. Niko Cardeans,    Pt is requesting intermittent FMLA due to asthma flare ups: 1-4 flare ups per month for the duration of 6 months. Do you support?     Thank you,  Cora Tay

## 2021-06-23 NOTE — TELEPHONE ENCOUNTER
Dr. Damon Handsome,     Please sign off on form: FMLA  -Highlight the patient and hit \"Chart\" button.   -In Chart Review, w/in the Encounter tab - click 1 time on the Telephone call encounter for 6/23/21 Scroll down the telephone encounter.  -Click \"scan on\" b

## 2021-06-30 NOTE — TELEPHONE ENCOUNTER
Dr. Roosevelt Chowduhry,     Please sign off on form: FMLA clarification , no appt needed  -Highlight the patient and hit \"Chart\" button.   -In Chart Review, w/in the Encounter tab - click 1 time on the Telephone call encounter for 6/23/21 Scroll down the telephone e

## 2021-07-05 ENCOUNTER — TELEPHONE (OUTPATIENT)
Dept: INTERNAL MEDICINE CLINIC | Facility: HOSPITAL | Age: 56
End: 2021-07-05

## 2021-07-05 DIAGNOSIS — Z20.822 SUSPECTED COVID-19 VIRUS INFECTION: Primary | ICD-10-CM

## 2021-07-05 NOTE — TELEPHONE ENCOUNTER
Department: Urology CF/ Gen Surgery                                 [] [address] [facility]  []CLAUDETTE   [x] 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  7/2/21  When are you next scheduled to work? 7/6/21    Did you have close contact with someone on your unit while not wearing a mask? (e.g., during meal breaks):  Yes []   No [x]    If yes, who:   Do you share a wo

## 2021-07-06 ENCOUNTER — LAB ENCOUNTER (OUTPATIENT)
Dept: LAB | Age: 56
End: 2021-07-06
Attending: PREVENTIVE MEDICINE

## 2021-07-06 DIAGNOSIS — Z20.822 SUSPECTED COVID-19 VIRUS INFECTION: ICD-10-CM

## 2021-07-06 LAB — SARS-COV-2 RNA RESP QL NAA+PROBE: NOT DETECTED

## 2021-07-06 NOTE — TELEPHONE ENCOUNTER
Results and RTW guidelines:    COVID RESULT GIVEN:      Test type:    [x] Rapid         [] Alinity      [x] NEGATIVE     Ordered Alinity retest?  []Yes   [x] No (skip to RTW)          Notes:   Reports sx are improving    RTW PLAN:    [] RTW 10 days wit

## 2021-07-07 NOTE — TELEPHONE ENCOUNTER
FMLA clarification completed and faxed to [address] [place], [place] [address] [phone].  Pt is aware

## 2021-07-16 ENCOUNTER — PATIENT MESSAGE (OUTPATIENT)
Dept: PULMONOLOGY | Facility: CLINIC | Age: 56
End: 2021-07-16

## 2021-07-16 ENCOUNTER — TELEPHONE (OUTPATIENT)
Dept: PULMONOLOGY | Facility: CLINIC | Age: 56
End: 2021-07-16

## 2021-07-16 RX ORDER — ALBUTEROL SULFATE 90 UG/1
2 AEROSOL, METERED RESPIRATORY (INHALATION) EVERY 4 HOURS PRN
Qty: 3 EACH | Refills: 1 | Status: SHIPPED | OUTPATIENT
Start: 2021-07-16

## 2021-07-16 RX ORDER — PREDNISONE 10 MG/1
TABLET ORAL
Qty: 18 TABLET | Refills: 0 | Status: SHIPPED | OUTPATIENT
Start: 2021-07-16

## 2021-07-16 NOTE — TELEPHONE ENCOUNTER
From: Dian Kyle  To: Corbin Ivey. Velia Licea MD  Sent: 7/16/2021 8:43 AM CDT  Subject: Non-Urgent Medical Question    Good morning Dr. Velia Licea I have been waking up about 3, 4am coughing over the last week and wheezing.  I will get up and take albuteral in

## 2021-07-16 NOTE — TELEPHONE ENCOUNTER
Good morning Dr. Arias Sessions I have been waking up about 3, 4am coughing over the last week and wheezing. I will get up and take albuteral inhaler ro help with the wheezing.   Today I am not feeling well still coughing and the wheezing  was crackles which I sydney

## 2021-07-16 NOTE — TELEPHONE ENCOUNTER
Noted. Orders placed for Prednisone and refill of ProAir. Spoke to patient and informed her. Patient states she doesn't need Symbicort at this time.

## 2021-09-17 ENCOUNTER — APPOINTMENT (OUTPATIENT)
Dept: OTHER | Facility: HOSPITAL | Age: 56
End: 2021-09-17
Attending: EMERGENCY MEDICINE

## 2021-09-23 ENCOUNTER — NURSE ONLY (OUTPATIENT)
Dept: INTERNAL MEDICINE CLINIC | Facility: CLINIC | Age: 56
End: 2021-09-23
Payer: COMMERCIAL

## 2021-09-23 ENCOUNTER — TELEPHONE (OUTPATIENT)
Dept: INTERNAL MEDICINE CLINIC | Facility: CLINIC | Age: 56
End: 2021-09-23

## 2021-09-23 DIAGNOSIS — Z23 IMMUNIZATION DUE: Primary | ICD-10-CM

## 2021-09-23 PROCEDURE — 90471 IMMUNIZATION ADMIN: CPT | Performed by: INTERNAL MEDICINE

## 2021-09-23 PROCEDURE — 90750 HZV VACC RECOMBINANT IM: CPT | Performed by: INTERNAL MEDICINE

## 2021-09-23 NOTE — PROGRESS NOTES
Patient is here today for a scheduled nurse visit. Patient name, , and allergies verified. Order verified in system per PCP Dr. Sarah Ferris. Patient is to receive first shingles injection. Patient received injection in right deltoid via IM injection.  Jessica

## 2021-09-23 NOTE — TELEPHONE ENCOUNTER
Patient is here looking to get her fist shingles injection  Pended order please sign off if appropriate

## 2021-09-24 ENCOUNTER — APPOINTMENT (OUTPATIENT)
Dept: OTHER | Facility: HOSPITAL | Age: 56
End: 2021-09-24
Attending: EMERGENCY MEDICINE

## 2021-09-29 ENCOUNTER — APPOINTMENT (OUTPATIENT)
Dept: OTHER | Facility: HOSPITAL | Age: 56
End: 2021-09-29
Attending: EMERGENCY MEDICINE

## 2021-09-30 ENCOUNTER — TELEPHONE (OUTPATIENT)
Dept: NEUROLOGY | Facility: CLINIC | Age: 56
End: 2021-09-30

## 2021-09-30 ENCOUNTER — IMMUNIZATION (OUTPATIENT)
Dept: LAB | Facility: HOSPITAL | Age: 56
End: 2021-09-30
Attending: EMERGENCY MEDICINE
Payer: COMMERCIAL

## 2021-09-30 ENCOUNTER — OFFICE VISIT (OUTPATIENT)
Dept: PHYSICAL MEDICINE AND REHAB | Facility: CLINIC | Age: 56
End: 2021-09-30
Payer: COMMERCIAL

## 2021-09-30 VITALS
SYSTOLIC BLOOD PRESSURE: 126 MMHG | DIASTOLIC BLOOD PRESSURE: 82 MMHG | WEIGHT: 195 LBS | HEART RATE: 87 BPM | OXYGEN SATURATION: 99 % | HEIGHT: 69 IN | BODY MASS INDEX: 28.88 KG/M2

## 2021-09-30 DIAGNOSIS — I10 ESSENTIAL HYPERTENSION: ICD-10-CM

## 2021-09-30 DIAGNOSIS — Z23 NEED FOR VACCINATION: Primary | ICD-10-CM

## 2021-09-30 DIAGNOSIS — Z52.4 KIDNEY DONOR: ICD-10-CM

## 2021-09-30 DIAGNOSIS — E66.9 OBESITY (BMI 30-39.9): ICD-10-CM

## 2021-09-30 DIAGNOSIS — S43.422A SPRAIN OF LEFT ROTATOR CUFF CAPSULE, INITIAL ENCOUNTER: Primary | ICD-10-CM

## 2021-09-30 DIAGNOSIS — G47.00 INSOMNIA, UNSPECIFIED TYPE: ICD-10-CM

## 2021-09-30 PROCEDURE — 0003A SARSCOV2 VAC 30MCG/0.3ML IM: CPT

## 2021-09-30 PROCEDURE — 3074F SYST BP LT 130 MM HG: CPT | Performed by: PHYSICAL MEDICINE & REHABILITATION

## 2021-09-30 PROCEDURE — 3008F BODY MASS INDEX DOCD: CPT | Performed by: PHYSICAL MEDICINE & REHABILITATION

## 2021-09-30 PROCEDURE — 99204 OFFICE O/P NEW MOD 45 MIN: CPT | Performed by: PHYSICAL MEDICINE & REHABILITATION

## 2021-09-30 PROCEDURE — 20610 DRAIN/INJ JOINT/BURSA W/O US: CPT | Performed by: PHYSICAL MEDICINE & REHABILITATION

## 2021-09-30 PROCEDURE — 3079F DIAST BP 80-89 MM HG: CPT | Performed by: PHYSICAL MEDICINE & REHABILITATION

## 2021-09-30 RX ORDER — TOPIRAMATE 25 MG/1
TABLET ORAL
Qty: 90 TABLET | Refills: 1 | OUTPATIENT
Start: 2021-09-30

## 2021-09-30 NOTE — TELEPHONE ENCOUNTER
Employers Claim # [id number] [address] is requesting in-office injection, seeing patient on 9/30/21.      Called Employees Claim,spoke to [employer] who verbally approved shoulder injection     Verbally notified Heladio Mo     Fax ord

## 2021-09-30 NOTE — PROGRESS NOTES
[address]  Progress Note    CHIEF COMPLAINT:  Patient presents with:  Shoulder Pain: New right handed pt comes in with work related injury to left shoulder,. Pt tried to use her as support and pulled down on colon 04/2019    repeat CLN in 5yrs       SURGICAL HISTORY:  Past Surgical History:   Procedure Laterality Date   • COLONOSCOPY  2011   • COLONOSCOPY     • EGD  2011   • LASIK Bilateral 2010   • NEEDLE BIOPSY RIGHT     • NEPHRECTOMY Right 1999   • NEPHRECT HIVES  Flu Virus Vaccine           Comment:Respiratory Problems    REVIEW OF SYSTEMS:   Patient-reported ROS  Constitutional  Sleep Disturbance: admits  Chills: denies  Fever: denies  Weight Gain: denies  Weight Loss: denies   Cardiovascular  Chest Pain: d neg    Data    Radiology Imaging:  I personally reviewed a left shoulder x-ray from February 2021 which is unremarkable. ASSESSMENT AND PLAN:  1. Sprain of left rotator cuff capsule, initial encounter  The patient has a left rotator cuff strain.   This w

## 2021-10-03 RX ORDER — SPIRONOLACTONE 50 MG/1
50 TABLET, FILM COATED ORAL DAILY
Qty: 90 TABLET | Refills: 0 | Status: SHIPPED | OUTPATIENT
Start: 2021-10-03 | End: 2022-01-01

## 2021-10-20 ENCOUNTER — TELEPHONE (OUTPATIENT)
Dept: INTERNAL MEDICINE CLINIC | Facility: CLINIC | Age: 56
End: 2021-10-20

## 2021-10-20 PROBLEM — F41.9 SEVERE ANXIETY: Status: ACTIVE | Noted: 2021-10-20

## 2021-10-20 NOTE — ASSESSMENT & PLAN NOTE
Patient has severe anxiety and feels overwhelmed at work. She is not suicidal or homicidal.  She does not want medication for this. I will refer her to behavioral health for further evaluation and treatment. I have advised her to take 3 days off work.

## 2021-10-20 NOTE — PROGRESS NOTES
This visit is conducted using Telemedicine with live, interactive video and audio. Patient has been referred to the [facility] website at [email or web address] to review the yearly Consent to Treat document.     Patient understands and accepts financial res Negative work up for coagulation disorders. Treated for 1 year.    • Tubular adenoma of colon 04/2019    repeat CLN in 5yrs          Current Outpatient Medications   Medication Sig Dispense Refill   • Zolpidem Tartrate ER (AMBIEN CR) 6.25 MG Oral Tab CR Ta headaches. Psychiatric/Behavioral: Positive for dysphoric mood and sleep disturbance. Negative for self-injury and suicidal ideas. The patient is nervous/anxious. Raquel Nj LMP 03/01/2021 (Exact Date)   PHYSICAL EXAM:   Physical Exam  Constitutional:

## 2021-10-21 ENCOUNTER — OFFICE VISIT (OUTPATIENT)
Dept: INTERNAL MEDICINE | Age: 56
End: 2021-10-21

## 2021-10-21 VITALS
TEMPERATURE: 98 F | HEART RATE: 93 BPM | SYSTOLIC BLOOD PRESSURE: 120 MMHG | BODY MASS INDEX: 27.12 KG/M2 | OXYGEN SATURATION: 98 % | HEIGHT: 70 IN | DIASTOLIC BLOOD PRESSURE: 82 MMHG | RESPIRATION RATE: 14 BRPM

## 2021-10-21 DIAGNOSIS — F43.21 ADJUSTMENT REACTION WITH BRIEF DEPRESSIVE REACTION: ICD-10-CM

## 2021-10-21 DIAGNOSIS — F41.1 ANXIETY AS ACUTE REACTION TO EXCEPTIONAL STRESS: Primary | ICD-10-CM

## 2021-10-21 DIAGNOSIS — F43.0 ANXIETY AS ACUTE REACTION TO EXCEPTIONAL STRESS: Primary | ICD-10-CM

## 2021-10-21 DIAGNOSIS — I10 ESSENTIAL HYPERTENSION: ICD-10-CM

## 2021-10-21 PROCEDURE — 3079F DIAST BP 80-89 MM HG: CPT | Performed by: INTERNAL MEDICINE

## 2021-10-21 PROCEDURE — 3074F SYST BP LT 130 MM HG: CPT | Performed by: INTERNAL MEDICINE

## 2021-10-21 PROCEDURE — 99214 OFFICE O/P EST MOD 30 MIN: CPT | Performed by: INTERNAL MEDICINE

## 2021-10-21 RX ORDER — BUDESONIDE AND FORMOTEROL FUMARATE DIHYDRATE 160; 4.5 UG/1; UG/1
AEROSOL RESPIRATORY (INHALATION)
COMMUNITY
Start: 2021-05-24

## 2021-10-21 RX ORDER — ZOLPIDEM TARTRATE 6.25 MG/1
6.25 TABLET, FILM COATED, EXTENDED RELEASE ORAL
COMMUNITY
Start: 2021-10-20

## 2021-10-21 RX ORDER — ALBUTEROL SULFATE 90 UG/1
2 AEROSOL, METERED RESPIRATORY (INHALATION)
COMMUNITY
Start: 2021-07-16

## 2021-10-21 RX ORDER — PREDNISONE 10 MG/1
TABLET ORAL
COMMUNITY
Start: 2021-07-16

## 2021-10-21 ASSESSMENT — ENCOUNTER SYMPTOMS
DIARRHEA: 0
WEAKNESS: 0
SORE THROAT: 0
NUMBNESS: 0
COUGH: 0
ABDOMINAL PAIN: 0
NAUSEA: 0
ADENOPATHY: 0
FEVER: 0
BLOOD IN STOOL: 0
WHEEZING: 0
SINUS PRESSURE: 0
EYE REDNESS: 0
CHILLS: 0
HEADACHES: 0
EYE DISCHARGE: 0
CHEST TIGHTNESS: 0
RHINORRHEA: 0
SHORTNESS OF BREATH: 0
POLYDIPSIA: 0
SEIZURES: 0
BACK PAIN: 0
NERVOUS/ANXIOUS: 0
CONSTIPATION: 0
FATIGUE: 0
DIZZINESS: 0
VOMITING: 0
HALLUCINATIONS: 0

## 2021-10-21 ASSESSMENT — PATIENT HEALTH QUESTIONNAIRE - PHQ9
SUM OF ALL RESPONSES TO PHQ9 QUESTIONS 1 AND 2: 0
CLINICAL INTERPRETATION OF PHQ2 SCORE: NO FURTHER SCREENING NEEDED
SUM OF ALL RESPONSES TO PHQ9 QUESTIONS 1 AND 2: 0
1. LITTLE INTEREST OR PLEASURE IN DOING THINGS: NOT AT ALL
2. FEELING DOWN, DEPRESSED OR HOPELESS: NOT AT ALL
CLINICAL INTERPRETATION OF PHQ9 SCORE: NO FURTHER SCREENING NEEDED

## 2021-10-21 ASSESSMENT — PAIN SCALES - GENERAL: PAINLEVEL: 4

## 2021-10-22 ENCOUNTER — TELEPHONE (OUTPATIENT)
Dept: SCHEDULING | Age: 56
End: 2021-10-22

## 2021-10-28 NOTE — TELEPHONE ENCOUNTER
Please advise her that I cannot sign the disability. I told the patient that I cannot provide a letter for disability for psychiatric reasons.   She needs to see a psychiatrist.

## 2021-10-28 NOTE — TELEPHONE ENCOUNTER
Dr. Teofilo Du,    **2 signs needing signature**     Please sign off on form: Fmla/Disability 10/21/21-11/14/21 RTW 11/15/21  -Highlight the patient and hit \"Chart\" button.   -In Chart Review, w/in the Encounter tab - click 1 time on the Telephone call encou

## 2021-10-29 ENCOUNTER — MED REC SCAN ONLY (OUTPATIENT)
Dept: PHYSICAL MEDICINE AND REHAB | Facility: CLINIC | Age: 56
End: 2021-10-29

## 2021-10-29 ENCOUNTER — OFFICE VISIT (OUTPATIENT)
Dept: PHYSICAL MEDICINE AND REHAB | Facility: CLINIC | Age: 56
End: 2021-10-29

## 2021-10-29 ENCOUNTER — TELEPHONE (OUTPATIENT)
Dept: PHYSICAL THERAPY | Facility: HOSPITAL | Age: 56
End: 2021-10-29

## 2021-10-29 VITALS
HEIGHT: 69 IN | DIASTOLIC BLOOD PRESSURE: 64 MMHG | SYSTOLIC BLOOD PRESSURE: 130 MMHG | WEIGHT: 195 LBS | BODY MASS INDEX: 28.88 KG/M2

## 2021-10-29 DIAGNOSIS — Z52.4 KIDNEY DONOR: ICD-10-CM

## 2021-10-29 DIAGNOSIS — S43.422A SPRAIN OF LEFT ROTATOR CUFF CAPSULE, INITIAL ENCOUNTER: Primary | ICD-10-CM

## 2021-10-29 DIAGNOSIS — G47.00 INSOMNIA, UNSPECIFIED TYPE: ICD-10-CM

## 2021-10-29 PROCEDURE — 99213 OFFICE O/P EST LOW 20 MIN: CPT | Performed by: PHYSICAL MEDICINE & REHABILITATION

## 2021-10-29 PROCEDURE — 3008F BODY MASS INDEX DOCD: CPT | Performed by: PHYSICAL MEDICINE & REHABILITATION

## 2021-10-29 PROCEDURE — 3075F SYST BP GE 130 - 139MM HG: CPT | Performed by: PHYSICAL MEDICINE & REHABILITATION

## 2021-10-29 PROCEDURE — 3078F DIAST BP <80 MM HG: CPT | Performed by: PHYSICAL MEDICINE & REHABILITATION

## 2021-10-29 NOTE — PROGRESS NOTES
[address]  Progress Note    CHIEF COMPLAINT:  Patient presents with: Follow - Up: LOV: 9/30/2021 INJ:9/30/2021 patient comes in today to follow up on L shoulder injection.  pt feels 60-70% improvement and h Negative work up for coagulation disorders. Treated for 1 year.    • Tubular adenoma of colon 04/2019    repeat CLN in 5yrs       SURGICAL HISTORY:  Past Surgical History:   Procedure Laterality Date   • COLONOSCOPY  2011   • COLONOSCOPY     • EGD  2011 Vaccine           Comment:Respiratory Problems        PHYSICAL EXAM:   LMP 03/01/2021 (Exact Date)     There is no height or weight on file to calculate BMI.       General: No immediate distress  Extremities: No upper extremity edema bilaterally   Spine:  f

## 2021-11-02 ENCOUNTER — ORDER TRANSCRIPTION (OUTPATIENT)
Dept: PHYSICAL THERAPY | Facility: HOSPITAL | Age: 56
End: 2021-11-02

## 2021-11-02 DIAGNOSIS — S43.422A SPRAIN OF LEFT ROTATOR CUFF CAPSULE, INITIAL ENCOUNTER: Primary | ICD-10-CM

## 2021-11-03 ENCOUNTER — OFFICE VISIT (OUTPATIENT)
Dept: PHYSICAL THERAPY | Facility: HOSPITAL | Age: 56
End: 2021-11-03
Attending: PHYSICAL MEDICINE & REHABILITATION
Payer: OTHER MISCELLANEOUS

## 2021-11-03 ENCOUNTER — MED INFO FORMS (OUTPATIENT)
Dept: INTERNAL MEDICINE | Age: 56
End: 2021-11-03

## 2021-11-03 ENCOUNTER — TELEPHONE (OUTPATIENT)
Dept: PHYSICAL THERAPY | Facility: HOSPITAL | Age: 56
End: 2021-11-03

## 2021-11-03 DIAGNOSIS — S43.422A SPRAIN OF LEFT ROTATOR CUFF CAPSULE, INITIAL ENCOUNTER: ICD-10-CM

## 2021-11-03 PROCEDURE — 97110 THERAPEUTIC EXERCISES: CPT

## 2021-11-03 PROCEDURE — 97161 PT EVAL LOW COMPLEX 20 MIN: CPT

## 2021-11-03 NOTE — PROGRESS NOTES
SHOULDER EVALUATION:   Referring Physician: Dr. Ange Cordova  Diagnosis:  Sprain of left rotator cuff capsule, initial encounter (Y39.507A)      Date of Service: 11/3/2021     PATIENT SUMMARY   Marlys Arriola is a 49year old (R) dominant female who presen Pseudocholinesterase deficiency, or Sleep apnea. Brown El presents to physical therapy evaluation with primary c/o intermittent pain and TTP in the (L) lateral proximal arm.  The results of the objective tests and measures show decreased CONTRERAS Patient will be seen for 2 x/week or a total of 12 visits over a 90 day period.  Treatment will include: Manual Therapy, Neuromuscular Re-education, Self-Care Home Management, Therapeutic Exercise, Home Exercise Program instruction and Modalities to include

## 2021-11-05 ENCOUNTER — APPOINTMENT (OUTPATIENT)
Dept: PHYSICAL THERAPY | Facility: HOSPITAL | Age: 56
End: 2021-11-05
Attending: PHYSICAL MEDICINE & REHABILITATION
Payer: OTHER MISCELLANEOUS

## 2021-11-05 PROCEDURE — 97110 THERAPEUTIC EXERCISES: CPT

## 2021-11-05 NOTE — PROGRESS NOTES
Diagnosis: Sprain of left rotator cuff capsule, initial encounter      Precautions: none  Insurance Type (# Auth):  (12) Total Timed Treatment: 40 min  Date POC Expires: 2/1    Total Treatment time: 45 min       Charges:  There Ex 3    Treatment Number:

## 2021-11-09 ENCOUNTER — APPOINTMENT (OUTPATIENT)
Dept: PHYSICAL THERAPY | Facility: HOSPITAL | Age: 56
End: 2021-11-09
Attending: PHYSICAL MEDICINE & REHABILITATION
Payer: OTHER MISCELLANEOUS

## 2021-11-09 ENCOUNTER — LAB ENCOUNTER (OUTPATIENT)
Dept: LAB | Facility: HOSPITAL | Age: 56
End: 2021-11-09
Attending: INTERNAL MEDICINE
Payer: COMMERCIAL

## 2021-11-09 ENCOUNTER — TELEPHONE (OUTPATIENT)
Dept: PHYSICAL THERAPY | Facility: HOSPITAL | Age: 56
End: 2021-11-09

## 2021-11-09 ENCOUNTER — PATIENT MESSAGE (OUTPATIENT)
Dept: INTERNAL MEDICINE CLINIC | Facility: CLINIC | Age: 56
End: 2021-11-09

## 2021-11-09 ENCOUNTER — OFFICE VISIT (OUTPATIENT)
Dept: INTERNAL MEDICINE CLINIC | Facility: CLINIC | Age: 56
End: 2021-11-09
Payer: COMMERCIAL

## 2021-11-09 VITALS
DIASTOLIC BLOOD PRESSURE: 83 MMHG | HEART RATE: 101 BPM | TEMPERATURE: 99 F | SYSTOLIC BLOOD PRESSURE: 116 MMHG | BODY MASS INDEX: 29 KG/M2 | HEIGHT: 69 IN

## 2021-11-09 DIAGNOSIS — M54.6 ACUTE LEFT-SIDED THORACIC BACK PAIN: ICD-10-CM

## 2021-11-09 DIAGNOSIS — F41.9 SEVERE ANXIETY: ICD-10-CM

## 2021-11-09 DIAGNOSIS — M54.6 ACUTE LEFT-SIDED THORACIC BACK PAIN: Primary | ICD-10-CM

## 2021-11-09 PROCEDURE — 81003 URINALYSIS AUTO W/O SCOPE: CPT

## 2021-11-09 PROCEDURE — 87086 URINE CULTURE/COLONY COUNT: CPT

## 2021-11-09 PROCEDURE — 3074F SYST BP LT 130 MM HG: CPT | Performed by: INTERNAL MEDICINE

## 2021-11-09 PROCEDURE — 3079F DIAST BP 80-89 MM HG: CPT | Performed by: INTERNAL MEDICINE

## 2021-11-09 PROCEDURE — 99213 OFFICE O/P EST LOW 20 MIN: CPT | Performed by: INTERNAL MEDICINE

## 2021-11-09 PROCEDURE — 3008F BODY MASS INDEX DOCD: CPT | Performed by: INTERNAL MEDICINE

## 2021-11-09 NOTE — TELEPHONE ENCOUNTER
From: Lucreica Gonzales  To: Terese Luu MD  Sent: 11/9/2021 9:02 AM CST  Subject: Back pain    Good morning Dr. Lurdes Márquez I am experiencing some flank back pain 7/8 out of 10.  It is in my kidney area not experiencing no real urine problems but would li

## 2021-11-09 NOTE — PROGRESS NOTES
HPI:    Patient ID: Per Fuentes is a 49year old female. HPI:  Pt c/o constant left mid-back pain for a few days. She took Tylenol which didn't help. Urinating seems to help the pain. Pt quit her job on Friday.   Pt is seeing a therapist.  She TAKE 2 PUFFS BY MOUTH TWICE A DAY 30.6 Inhaler 1   • albuterol sulfate (2.5 MG/3ML) 0.083% Inhalation Nebu Soln Take 3 mL (2.5 mg total) by nebulization every 4 (four) hours as needed for Wheezing.  50 ampule 3   • Zolpidem Tartrate ER (AMBIEN CR) 6.25 MG O Thought content normal.         Judgment: Judgment normal.                 ASSESSMENT/PLAN:     Problem List Items Addressed This Visit        High    Acute left-sided thoracic back pain - Primary     Check UA and culture. If negative, continue Tylenol.

## 2021-11-09 NOTE — TELEPHONE ENCOUNTER
From: Juan Francisco Givens  To: Lia Peñaloza MD  Sent: 11/9/2021 1:26 PM CST  Subject: Back psin    I can not get through but will be there at 440 I tried calling several times and waited I e en called [facility] unit trying to get through

## 2021-11-09 NOTE — TELEPHONE ENCOUNTER
Patient called. Will place on schedule. 4:40pm today per Dr. Angel Cochran. Confirmed appt on schedule.

## 2021-11-10 RX ORDER — LINACLOTIDE 145 UG/1
CAPSULE, GELATIN COATED ORAL
Qty: 90 CAPSULE | Refills: 1 | Status: SHIPPED | OUTPATIENT
Start: 2021-11-10

## 2021-11-10 NOTE — TELEPHONE ENCOUNTER
Requested Prescriptions     Pending Prescriptions Disp Refills   • LINZESS 145 MCG Oral Cap [Pharmacy Med Name: LINZESS 145 MCG CAPSULE] 90 capsule 1     Sig: TAKE 1 CAPSULE BY MOUTH EVERY DAY       LOV 3/26/2021  LR  5/18/2021    em

## 2021-11-11 ENCOUNTER — OFFICE VISIT (OUTPATIENT)
Dept: INTERNAL MEDICINE | Age: 56
End: 2021-11-11

## 2021-11-11 ENCOUNTER — OFFICE VISIT (OUTPATIENT)
Dept: PHYSICAL THERAPY | Facility: HOSPITAL | Age: 56
End: 2021-11-11
Attending: PHYSICAL MEDICINE & REHABILITATION
Payer: OTHER MISCELLANEOUS

## 2021-11-11 VITALS
DIASTOLIC BLOOD PRESSURE: 81 MMHG | HEIGHT: 70 IN | TEMPERATURE: 98 F | SYSTOLIC BLOOD PRESSURE: 118 MMHG | OXYGEN SATURATION: 100 % | RESPIRATION RATE: 18 BRPM | HEART RATE: 80 BPM | BODY MASS INDEX: 27.12 KG/M2

## 2021-11-11 DIAGNOSIS — I10 ESSENTIAL HYPERTENSION: ICD-10-CM

## 2021-11-11 DIAGNOSIS — F43.0 ANXIETY AS ACUTE REACTION TO EXCEPTIONAL STRESS: ICD-10-CM

## 2021-11-11 DIAGNOSIS — Z12.31 VISIT FOR SCREENING MAMMOGRAM: Primary | ICD-10-CM

## 2021-11-11 DIAGNOSIS — M54.9 BACK PAIN, UNSPECIFIED BACK LOCATION, UNSPECIFIED BACK PAIN LATERALITY, UNSPECIFIED CHRONICITY: ICD-10-CM

## 2021-11-11 DIAGNOSIS — F41.1 ANXIETY AS ACUTE REACTION TO EXCEPTIONAL STRESS: ICD-10-CM

## 2021-11-11 PROCEDURE — 97110 THERAPEUTIC EXERCISES: CPT

## 2021-11-11 PROCEDURE — 3079F DIAST BP 80-89 MM HG: CPT | Performed by: INTERNAL MEDICINE

## 2021-11-11 PROCEDURE — 99214 OFFICE O/P EST MOD 30 MIN: CPT | Performed by: INTERNAL MEDICINE

## 2021-11-11 PROCEDURE — 3074F SYST BP LT 130 MM HG: CPT | Performed by: INTERNAL MEDICINE

## 2021-11-11 RX ORDER — IBUPROFEN 800 MG/1
800 TABLET ORAL 3 TIMES DAILY
Qty: 60 TABLET | Refills: 3 | Status: SHIPPED | OUTPATIENT
Start: 2021-11-11

## 2021-11-11 ASSESSMENT — ENCOUNTER SYMPTOMS
SINUS PRESSURE: 0
ABDOMINAL PAIN: 0
CHEST TIGHTNESS: 0
EYE REDNESS: 0
CHILLS: 0
WEAKNESS: 0
NAUSEA: 0
DIZZINESS: 0
NUMBNESS: 0
EYE DISCHARGE: 0
WHEEZING: 0
SORE THROAT: 0
ADENOPATHY: 0
COUGH: 0
HALLUCINATIONS: 0
CONSTIPATION: 0
SHORTNESS OF BREATH: 0
NERVOUS/ANXIOUS: 0
FEVER: 0
FATIGUE: 0
BACK PAIN: 0
POLYDIPSIA: 0
SEIZURES: 0
HEADACHES: 0
VOMITING: 0
RHINORRHEA: 0
DIARRHEA: 0
BLOOD IN STOOL: 0

## 2021-11-11 NOTE — PROGRESS NOTES
Diagnosis: Sprain of left rotator cuff capsule, initial encounter      Precautions: none  Insurance Type (# Auth):  (12) Total Timed Treatment: 40 min  Date POC Expires: 2/1    Total Treatment time: 40 min       Charges:  There Ex 3    Treatment Number: behind back to T10    Shoulder Sup AROM updated 11/11/2021   Flexion: R 180; L 110 to 165  Abduction: R 180; L 55 to 125  ER: R 95; L 25 to 60  IR: R 70; L 50 to 70         Strength/MMT: (* denotes performed with pain)  Shoulder Elbow   Flexion: R 5/5; L 3

## 2021-11-15 ENCOUNTER — APPOINTMENT (OUTPATIENT)
Dept: PHYSICAL THERAPY | Facility: HOSPITAL | Age: 56
End: 2021-11-15
Attending: PHYSICAL MEDICINE & REHABILITATION
Payer: OTHER MISCELLANEOUS

## 2021-11-15 ENCOUNTER — TELEPHONE (OUTPATIENT)
Dept: PHYSICAL THERAPY | Facility: HOSPITAL | Age: 56
End: 2021-11-15

## 2021-11-16 ENCOUNTER — TELEPHONE (OUTPATIENT)
Dept: PHYSICAL THERAPY | Facility: HOSPITAL | Age: 56
End: 2021-11-16

## 2021-11-16 NOTE — TELEPHONE ENCOUNTER
LVM due to pt failing to show for her PT appt today. Requested a call back to my direct line to confirm her appt on Wed.

## 2021-11-17 ENCOUNTER — APPOINTMENT (OUTPATIENT)
Dept: PHYSICAL THERAPY | Facility: HOSPITAL | Age: 56
End: 2021-11-17
Attending: PHYSICAL MEDICINE & REHABILITATION
Payer: OTHER MISCELLANEOUS

## 2021-11-17 NOTE — TELEPHONE ENCOUNTER
LVM again asking pt to call back to confirm that she is coming to her PT appt tomorrow since she did not show for her last PT appt and has not contacted me.

## 2021-11-24 ENCOUNTER — PATIENT MESSAGE (OUTPATIENT)
Dept: INTERNAL MEDICINE CLINIC | Facility: CLINIC | Age: 56
End: 2021-11-24

## 2021-11-25 NOTE — TELEPHONE ENCOUNTER
From: Roberto Randolph  Sent: 11/24/2021 8:27 PM CST  To: Em Rn Triage  Subject: Covid test    Hello is the immediate care still open

## 2021-11-26 ENCOUNTER — APPOINTMENT (OUTPATIENT)
Dept: PHYSICAL THERAPY | Facility: HOSPITAL | Age: 56
End: 2021-11-26
Attending: PHYSICAL MEDICINE & REHABILITATION
Payer: OTHER MISCELLANEOUS

## 2021-12-06 ENCOUNTER — OFFICE VISIT (OUTPATIENT)
Dept: PULMONOLOGY | Facility: CLINIC | Age: 56
End: 2021-12-06
Payer: COMMERCIAL

## 2021-12-06 VITALS
HEART RATE: 83 BPM | RESPIRATION RATE: 14 BRPM | HEIGHT: 69 IN | DIASTOLIC BLOOD PRESSURE: 91 MMHG | SYSTOLIC BLOOD PRESSURE: 138 MMHG | WEIGHT: 195 LBS | OXYGEN SATURATION: 99 % | BODY MASS INDEX: 28.88 KG/M2

## 2021-12-06 DIAGNOSIS — J45.20 MILD INTERMITTENT ASTHMA WITHOUT COMPLICATION: Primary | ICD-10-CM

## 2021-12-06 PROCEDURE — 3080F DIAST BP >= 90 MM HG: CPT | Performed by: INTERNAL MEDICINE

## 2021-12-06 PROCEDURE — 99214 OFFICE O/P EST MOD 30 MIN: CPT | Performed by: INTERNAL MEDICINE

## 2021-12-06 PROCEDURE — 3075F SYST BP GE 130 - 139MM HG: CPT | Performed by: INTERNAL MEDICINE

## 2021-12-06 PROCEDURE — 3008F BODY MASS INDEX DOCD: CPT | Performed by: INTERNAL MEDICINE

## 2021-12-06 NOTE — PROGRESS NOTES
Subjective:   Patient ID: Roberto Randolph is a 49year old female.     HPI    Asthma symptoms remain controlled off inhaled steroid, and she is using albuterol once or twice a week and sometimes at night but overall not more than 2-3 times a week  Good f [Metronidazo*    HIVES  Flu Virus Vaccine           Comment:Respiratory Problems    Objective:   Physical Exam  Constitutional:       Appearance: Normal appearance. She is not diaphoretic. HENT:      Head: Normocephalic.       Nose: No congestion or rhino

## 2021-12-16 ENCOUNTER — OFFICE VISIT (OUTPATIENT)
Dept: SURGERY | Facility: CLINIC | Age: 56
End: 2021-12-16
Payer: COMMERCIAL

## 2021-12-16 VITALS
BODY MASS INDEX: 34.17 KG/M2 | DIASTOLIC BLOOD PRESSURE: 85 MMHG | SYSTOLIC BLOOD PRESSURE: 126 MMHG | HEIGHT: 69 IN | OXYGEN SATURATION: 98 % | WEIGHT: 230.69 LBS | HEART RATE: 88 BPM

## 2021-12-16 DIAGNOSIS — E53.8 VITAMIN B12 DEFICIENCY: ICD-10-CM

## 2021-12-16 DIAGNOSIS — Z51.81 ENCOUNTER FOR THERAPEUTIC DRUG MONITORING: ICD-10-CM

## 2021-12-16 DIAGNOSIS — I10 ESSENTIAL HYPERTENSION: Primary | ICD-10-CM

## 2021-12-16 DIAGNOSIS — E66.9 OBESITY (BMI 30-39.9): ICD-10-CM

## 2021-12-16 PROCEDURE — 3008F BODY MASS INDEX DOCD: CPT | Performed by: INTERNAL MEDICINE

## 2021-12-16 PROCEDURE — 3074F SYST BP LT 130 MM HG: CPT | Performed by: INTERNAL MEDICINE

## 2021-12-16 PROCEDURE — 3079F DIAST BP 80-89 MM HG: CPT | Performed by: INTERNAL MEDICINE

## 2021-12-16 PROCEDURE — 99214 OFFICE O/P EST MOD 30 MIN: CPT | Performed by: INTERNAL MEDICINE

## 2021-12-16 RX ORDER — PHENTERMINE HYDROCHLORIDE 15 MG/1
15 CAPSULE ORAL EVERY MORNING
Qty: 30 CAPSULE | Refills: 2 | Status: SHIPPED | OUTPATIENT
Start: 2021-12-16

## 2021-12-16 RX ORDER — TOPIRAMATE 25 MG/1
25 TABLET ORAL EVERY EVENING
Qty: 90 TABLET | Refills: 1 | Status: SHIPPED | OUTPATIENT
Start: 2021-12-16 | End: 2022-03-16

## 2021-12-16 NOTE — PROGRESS NOTES
[address]  Dept: [phone]     Patient:  Durga Mendoza  :      1965  MRN:      [id number]    Chief Complaint:  Patient presents w • Zolpidem Tartrate ER (AMBIEN CR) 6.25 MG Oral Tab CR Take 1 tablet (6.25 mg total) by mouth nightly as needed for Sleep. 20 tablet 0   • spironolactone 50 MG Oral Tab Take 1 tablet (50 mg total) by mouth daily.  90 tablet 0   • Albuterol Sulfate HFA (KS Not on file  Transportation Needs: Not on file  Physical Activity: Not on file  Stress: Not on file  Social Connections: Not on file  Intimate Partner Violence: Not on file  Housing Stability: Not on file  Surgical History:    Past Surgical History:   Proc drinking 30 minutes before or after meals, Utlize portion control strategies to reduce calorie intake, Identify triggers for eating and manage cues and Eat slowly and take 20 to 30 minutes to complete each meal    Exercise Goals Reviewed and Discussed    W traditional weight loss at this time.       Asthma: stable       Hx of PE: in 2013 after myomectomy; was on Coumadin x 1 year after PEs     Topiramte has been effective for Emmett and decreasing her sweet tooth. Goals for next month:  1.  Keep a food lo

## 2022-01-01 DIAGNOSIS — I10 ESSENTIAL HYPERTENSION: ICD-10-CM

## 2022-01-01 RX ORDER — SPIRONOLACTONE 50 MG/1
50 TABLET, FILM COATED ORAL DAILY
Qty: 90 TABLET | Refills: 1 | Status: SHIPPED | OUTPATIENT
Start: 2022-01-01 | End: 2022-07-07

## 2022-01-01 NOTE — TELEPHONE ENCOUNTER
Refill passed per [employer], [facility] protocol.   Requested Prescriptions   Pending Prescriptions Disp Refills    SPIRONOLACTONE 50 MG Oral Tab [Pharmacy Med Name: SPIRONOLACTONE 50 MG TABLET] 90 tablet 0     Sig: TAKE 1 TABLET BY MOUTH EVERY DAY        Hypertensive Medications Protocol Passed - 1/1/2022 12:17 AM        Passed - CMP or BMP in past 12 months        Passed - Appointment in past 6 or next 3 months        Passed - GFR  > 50     Lab Results   Component Value Date    GFRAA 64 (L) 03/16/2021                      Recent Outpatient Visits              2 weeks ago Essential hypertension    Cookie Ivey MD    Office Visit    3 weeks ago Mild intermittent asthma without complication    [facility], [facility], Chung Castillo MD    Office Visit    1 month ago     [address] [place] [address]    Office Visit    1 month ago Acute left-sided thoracic back pain    Paul Griggs MD    Office Visit    1 month ago     [address] [place] [address]    Office Visit

## 2022-01-14 ENCOUNTER — PATIENT MESSAGE (OUTPATIENT)
Dept: GASTROENTEROLOGY | Facility: CLINIC | Age: 57
End: 2022-01-14

## 2022-01-14 ENCOUNTER — TELEPHONE (OUTPATIENT)
Dept: GASTROENTEROLOGY | Facility: CLINIC | Age: 57
End: 2022-01-14

## 2022-01-14 NOTE — TELEPHONE ENCOUNTER
Pt states insurance plan has changed and PA is needed for Linzess. Pt states to call [phone] option1, option1.

## 2022-01-14 NOTE — TELEPHONE ENCOUNTER
From: Milly Louise  To: Leslye Ivey MD  Sent: 1/14/2022 12:20 PM CST  Subject: Need new prior authorization     Attached is my new insurance card

## 2022-01-14 NOTE — TELEPHONE ENCOUNTER
Spoke to [place] with [employer] FEP. Complete PA for Linzess and obtained approval, valid through 1/14/2023. No approval code provided, confirmation faxed will be sent to office. Patient notified via [facility] [address].

## 2022-01-14 NOTE — TELEPHONE ENCOUNTER
Contacted patient and informed her that approval was received. She will contact her pharmacy to update them.

## 2022-03-10 ENCOUNTER — LAB ENCOUNTER (OUTPATIENT)
Dept: LAB | Facility: HOSPITAL | Age: 57
End: 2022-03-10
Attending: INTERNAL MEDICINE
Payer: COMMERCIAL

## 2022-03-10 ENCOUNTER — OFFICE VISIT (OUTPATIENT)
Dept: INTERNAL MEDICINE CLINIC | Facility: CLINIC | Age: 57
End: 2022-03-10
Payer: COMMERCIAL

## 2022-03-10 VITALS — HEART RATE: 94 BPM | SYSTOLIC BLOOD PRESSURE: 135 MMHG | DIASTOLIC BLOOD PRESSURE: 83 MMHG

## 2022-03-10 DIAGNOSIS — M25.571 ACUTE RIGHT ANKLE PAIN: Primary | ICD-10-CM

## 2022-03-10 DIAGNOSIS — L56.8 PHOTOSENSITIVITY DERMATITIS DUE TO SUN: ICD-10-CM

## 2022-03-10 DIAGNOSIS — M25.571 ACUTE RIGHT ANKLE PAIN: ICD-10-CM

## 2022-03-10 LAB — URATE SERPL-MCNC: 5.6 MG/DL

## 2022-03-10 PROCEDURE — 86038 ANTINUCLEAR ANTIBODIES: CPT

## 2022-03-10 PROCEDURE — 36415 COLL VENOUS BLD VENIPUNCTURE: CPT

## 2022-03-10 PROCEDURE — 99213 OFFICE O/P EST LOW 20 MIN: CPT | Performed by: INTERNAL MEDICINE

## 2022-03-10 PROCEDURE — 86235 NUCLEAR ANTIGEN ANTIBODY: CPT

## 2022-03-10 PROCEDURE — 84550 ASSAY OF BLOOD/URIC ACID: CPT

## 2022-03-10 PROCEDURE — 3075F SYST BP GE 130 - 139MM HG: CPT | Performed by: INTERNAL MEDICINE

## 2022-03-10 PROCEDURE — 3079F DIAST BP 80-89 MM HG: CPT | Performed by: INTERNAL MEDICINE

## 2022-03-10 RX ORDER — PREDNISONE 20 MG/1
20 TABLET ORAL DAILY
Qty: 5 TABLET | Refills: 0 | Status: SHIPPED | OUTPATIENT
Start: 2022-03-10 | End: 2022-03-21

## 2022-03-10 NOTE — PATIENT INSTRUCTIONS
Right ankle pain / swelling / ? Gout vs arthritis , apply ice , prednisone for few days since she can not take nsaid , check uric acid , if not better within next few days call us

## 2022-03-11 ENCOUNTER — PATIENT MESSAGE (OUTPATIENT)
Dept: INTERNAL MEDICINE CLINIC | Facility: CLINIC | Age: 57
End: 2022-03-11

## 2022-03-11 ENCOUNTER — TELEMEDICINE (OUTPATIENT)
Dept: SURGERY | Facility: CLINIC | Age: 57
End: 2022-03-11
Payer: COMMERCIAL

## 2022-03-11 VITALS — BODY MASS INDEX: 33.33 KG/M2 | WEIGHT: 225 LBS | HEIGHT: 69 IN

## 2022-03-11 DIAGNOSIS — I10 ESSENTIAL HYPERTENSION: Primary | ICD-10-CM

## 2022-03-11 DIAGNOSIS — E66.9 OBESITY (BMI 30-39.9): ICD-10-CM

## 2022-03-11 DIAGNOSIS — Z51.81 ENCOUNTER FOR THERAPEUTIC DRUG MONITORING: ICD-10-CM

## 2022-03-11 PROCEDURE — 3008F BODY MASS INDEX DOCD: CPT | Performed by: INTERNAL MEDICINE

## 2022-03-11 PROCEDURE — 99214 OFFICE O/P EST MOD 30 MIN: CPT | Performed by: INTERNAL MEDICINE

## 2022-03-11 RX ORDER — DIETHYLPROPION HYDROCHLORIDE 75 MG/1
1 TABLET ORAL DAILY
Qty: 30 TABLET | Refills: 2 | Status: SHIPPED | OUTPATIENT
Start: 2022-03-11 | End: 2022-04-10

## 2022-03-11 NOTE — TELEPHONE ENCOUNTER
From: Rogene Hamman  To: Sharmila Mcbride MD  Sent: 3/11/2022 12:39 AM CST  Subject: Question regarding URIC ACID    I saw the results they ate on the high end of normal so does this mean gout or not

## 2022-03-14 LAB — NUCLEAR IGG TITR SER IF: NEGATIVE {TITER}

## 2022-03-16 ENCOUNTER — APPOINTMENT (OUTPATIENT)
Dept: GENERAL RADIOLOGY | Age: 57
End: 2022-03-16
Attending: PHYSICIAN ASSISTANT
Payer: OTHER MISCELLANEOUS

## 2022-03-16 ENCOUNTER — HOSPITAL ENCOUNTER (OUTPATIENT)
Age: 57
Discharge: HOME | End: 2022-03-16
Payer: OTHER MISCELLANEOUS

## 2022-03-16 VITALS
RESPIRATION RATE: 18 BRPM | TEMPERATURE: 98 F | OXYGEN SATURATION: 100 % | DIASTOLIC BLOOD PRESSURE: 90 MMHG | SYSTOLIC BLOOD PRESSURE: 140 MMHG | HEART RATE: 89 BPM

## 2022-03-16 DIAGNOSIS — S59.911A INJURY OF RIGHT FOREARM, INITIAL ENCOUNTER: Primary | ICD-10-CM

## 2022-03-16 DIAGNOSIS — S69.91XA INJURY OF RIGHT WRIST, INITIAL ENCOUNTER: ICD-10-CM

## 2022-03-16 LAB
ENA SS-A AB SER QL IA: NEGATIVE
ENA SS-B AB SER QL IA: NEGATIVE

## 2022-03-16 PROCEDURE — 99203 OFFICE O/P NEW LOW 30 MIN: CPT | Performed by: PHYSICIAN ASSISTANT

## 2022-03-16 PROCEDURE — 73110 X-RAY EXAM OF WRIST: CPT | Performed by: PHYSICIAN ASSISTANT

## 2022-03-16 PROCEDURE — 73090 X-RAY EXAM OF FOREARM: CPT | Performed by: PHYSICIAN ASSISTANT

## 2022-03-16 PROCEDURE — L3908 WHO COCK-UP NONMOLDE PRE OTS: HCPCS | Performed by: PHYSICIAN ASSISTANT

## 2022-03-16 RX ORDER — IBUPROFEN 600 MG/1
600 TABLET ORAL ONCE
Status: COMPLETED | OUTPATIENT
Start: 2022-03-16 | End: 2022-03-16
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
## 2022-03-17 ENCOUNTER — TELEPHONE (OUTPATIENT)
Dept: INTERNAL MEDICINE CLINIC | Facility: CLINIC | Age: 57
End: 2022-03-17

## 2022-03-17 NOTE — TELEPHONE ENCOUNTER
Patient is requesting earlier appointment date with Dr. Teena Palomino. Patient was seen at [facility] on 3/16 and was told to have a follow up appointment with Dr. Teena Palomino within one to two business days. Dr. Teena Palomino next available is in April. Patient declined other providers.

## 2022-03-19 NOTE — PROGRESS NOTES
Testing for photosensitive antibodies negative patient with history of photodermatitis the GENIE and Sjogren's antibodies are negative no underlying autoimmune process triggering sun sensitivity

## 2022-03-21 ENCOUNTER — LAB ENCOUNTER (OUTPATIENT)
Dept: LAB | Age: 57
End: 2022-03-21
Attending: INTERNAL MEDICINE
Payer: COMMERCIAL

## 2022-03-21 ENCOUNTER — OFFICE VISIT (OUTPATIENT)
Dept: INTERNAL MEDICINE CLINIC | Facility: CLINIC | Age: 57
End: 2022-03-21

## 2022-03-21 VITALS
HEIGHT: 69 IN | SYSTOLIC BLOOD PRESSURE: 128 MMHG | BODY MASS INDEX: 33 KG/M2 | HEART RATE: 89 BPM | RESPIRATION RATE: 18 BRPM | DIASTOLIC BLOOD PRESSURE: 83 MMHG

## 2022-03-21 DIAGNOSIS — S40.021D CONTUSION OF RIGHT UPPER EXTREMITY, SUBSEQUENT ENCOUNTER: Primary | ICD-10-CM

## 2022-03-21 DIAGNOSIS — R20.2 NUMBNESS AND TINGLING IN RIGHT HAND: ICD-10-CM

## 2022-03-21 DIAGNOSIS — R20.0 NUMBNESS AND TINGLING IN RIGHT HAND: ICD-10-CM

## 2022-03-21 PROBLEM — S40.021A CONTUSION OF RIGHT ARM: Status: ACTIVE | Noted: 2022-03-21

## 2022-03-21 LAB
ANION GAP SERPL CALC-SCNC: 7 MMOL/L (ref 0–18)
BASOPHILS # BLD AUTO: 0.04 X10(3) UL (ref 0–0.2)
BASOPHILS NFR BLD AUTO: 0.5 %
BUN BLD-MCNC: 14 MG/DL (ref 7–18)
BUN/CREAT SERPL: 13.5 (ref 10–20)
CALCIUM BLD-MCNC: 9.3 MG/DL (ref 8.5–10.1)
CHLORIDE SERPL-SCNC: 111 MMOL/L (ref 98–112)
CK SERPL-CCNC: 130 U/L
CO2 SERPL-SCNC: 26 MMOL/L (ref 21–32)
CREAT BLD-MCNC: 1.04 MG/DL
EOSINOPHIL # BLD AUTO: 0.26 X10(3) UL (ref 0–0.7)
EOSINOPHIL NFR BLD AUTO: 3.5 %
ERYTHROCYTE [DISTWIDTH] IN BLOOD BY AUTOMATED COUNT: 13.9 % (ref 11–15)
FASTING STATUS PATIENT QL REPORTED: NO
GLUCOSE BLD-MCNC: 83 MG/DL (ref 70–99)
HCT VFR BLD AUTO: 42.1 %
HGB BLD-MCNC: 13.1 G/DL
IMM GRANULOCYTES # BLD AUTO: 0.05 X10(3) UL (ref 0–1)
IMM GRANULOCYTES NFR BLD: 0.7 %
LYMPHOCYTES # BLD AUTO: 2.28 X10(3) UL (ref 1–4)
LYMPHOCYTES NFR BLD AUTO: 30.3 %
MCH RBC QN AUTO: 27.6 PG (ref 26–34)
MCHC RBC AUTO-ENTMCNC: 31.1 G/DL (ref 31–37)
MCV RBC AUTO: 88.8 FL
MONOCYTES # BLD AUTO: 0.62 X10(3) UL (ref 0.1–1)
MONOCYTES NFR BLD AUTO: 8.2 %
NEUTROPHILS # BLD AUTO: 4.27 X10 (3) UL (ref 1.5–7.7)
NEUTROPHILS # BLD AUTO: 4.27 X10(3) UL (ref 1.5–7.7)
NEUTROPHILS NFR BLD AUTO: 56.8 %
OSMOLALITY SERPL CALC.SUM OF ELEC: 298 MOSM/KG (ref 275–295)
PLATELET # BLD AUTO: 269 10(3)UL (ref 150–450)
POTASSIUM SERPL-SCNC: 4.3 MMOL/L (ref 3.5–5.1)
RBC # BLD AUTO: 4.74 X10(6)UL
SODIUM SERPL-SCNC: 144 MMOL/L (ref 136–145)
WBC # BLD AUTO: 7.5 X10(3) UL (ref 4–11)

## 2022-03-21 PROCEDURE — 99214 OFFICE O/P EST MOD 30 MIN: CPT | Performed by: INTERNAL MEDICINE

## 2022-03-21 PROCEDURE — 3079F DIAST BP 80-89 MM HG: CPT | Performed by: INTERNAL MEDICINE

## 2022-03-21 PROCEDURE — 80048 BASIC METABOLIC PNL TOTAL CA: CPT

## 2022-03-21 PROCEDURE — 3008F BODY MASS INDEX DOCD: CPT | Performed by: INTERNAL MEDICINE

## 2022-03-21 PROCEDURE — 3074F SYST BP LT 130 MM HG: CPT | Performed by: INTERNAL MEDICINE

## 2022-03-21 PROCEDURE — 85025 COMPLETE CBC W/AUTO DIFF WBC: CPT

## 2022-03-21 PROCEDURE — 82550 ASSAY OF CK (CPK): CPT

## 2022-03-21 PROCEDURE — 36415 COLL VENOUS BLD VENIPUNCTURE: CPT

## 2022-03-21 RX ORDER — TRAMADOL HYDROCHLORIDE 50 MG/1
50 TABLET ORAL EVERY 6 HOURS PRN
Qty: 20 TABLET | Refills: 0 | Status: SHIPPED | OUTPATIENT
Start: 2022-03-21

## 2022-03-21 RX ORDER — IBUPROFEN 800 MG/1
TABLET ORAL
COMMUNITY
Start: 2021-12-02 | End: 2022-03-21

## 2022-03-21 NOTE — PATIENT INSTRUCTIONS
Take ibuprofen 200 mg 1 tabs 4 times daily as needed for pain. Stop it and call if you have nausea, abdominal pain, or other stomach problems.

## 2022-03-23 ENCOUNTER — TELEPHONE (OUTPATIENT)
Dept: PHYSICAL THERAPY | Facility: HOSPITAL | Age: 57
End: 2022-03-23

## 2022-03-23 ENCOUNTER — OFFICE VISIT (OUTPATIENT)
Dept: OCCUPATIONAL MEDICINE | Age: 57
End: 2022-03-23
Attending: INTERNAL MEDICINE
Payer: COMMERCIAL

## 2022-03-23 ENCOUNTER — NURSE ONLY (OUTPATIENT)
Dept: INTERNAL MEDICINE CLINIC | Facility: CLINIC | Age: 57
End: 2022-03-23
Payer: COMMERCIAL

## 2022-03-23 DIAGNOSIS — R20.2 NUMBNESS AND TINGLING IN RIGHT HAND: ICD-10-CM

## 2022-03-23 DIAGNOSIS — Z23 NEED FOR SHINGLES VACCINE: Primary | ICD-10-CM

## 2022-03-23 DIAGNOSIS — R20.0 NUMBNESS AND TINGLING IN RIGHT HAND: ICD-10-CM

## 2022-03-23 DIAGNOSIS — S40.021D CONTUSION OF RIGHT UPPER EXTREMITY, SUBSEQUENT ENCOUNTER: ICD-10-CM

## 2022-03-23 PROCEDURE — 97165 OT EVAL LOW COMPLEX 30 MIN: CPT

## 2022-03-23 PROCEDURE — 97110 THERAPEUTIC EXERCISES: CPT

## 2022-03-23 PROCEDURE — 90750 HZV VACC RECOMBINANT IM: CPT | Performed by: INTERNAL MEDICINE

## 2022-03-23 PROCEDURE — 90471 IMMUNIZATION ADMIN: CPT | Performed by: INTERNAL MEDICINE

## 2022-03-23 PROCEDURE — 97140 MANUAL THERAPY 1/> REGIONS: CPT

## 2022-03-23 NOTE — PROGRESS NOTES
Patient came in for her 2nd Shingrix. Shingrix vaccine given on her Left Deltoid,patient tolerated well.

## 2022-03-23 NOTE — PROGRESS NOTES
Dx:  Contusion of right upper extremity, subsequent encounter (S40.021D)  Numbness and tingling in right hand (R20.0,R20.2)      Insurance (Authorized # of Visits):  PPO           Authorizing Physician: Dr. Zachery Herring  Next MD visit: prn  Fall Risk: standard         Precautions:  none         Date of initial eval: 3/23/22  Order Date:  3/21/2022  Authorizing Provider:   Rabia Tamez     Procedure:  OCCUPATIONAL THERAPY - INTERNAL [[id number]]         Order #:   [id number]  Qty:  1     Priority:  Routine                   Class:   IHP - RFL     Standing Interval:          Standing Occurrences:          Expires on:            Expected by:    Associated DX:  Contusion of right upper extremity, subsequent encounter (S40.021D)  Numbness and tingling in right hand (R20.0,R20.2)     Order summary:  IHP - RFL, Routine, 10 visits  Occupational  Therapy Area of Concentration: Orthopedic  Occupational Therapy Ortho: UE  If the patient can be seen sooner with a PT vs an OT, can we cancel this order and replace with a PT order? Yes         Comments:  Evaluate and treat    Subjective: Reports some improvement in pain and movement. Pain: 3-4/10 at rest      Objective: observed to use hand in more normal posturing, including weight bearing on chair armrest.      Assessment: Patient presents with improvements in finger and hand motion since initial eval.  However, she continues with pain in the forearm and hand, gingerly moving arm at times, which indicate and support the need for ordered OT. Goals:  (to be met in 8 visits)  Patient will be independent and compliant with comprehensive HEP to maintain progress achieved in OT. Patient will present with increase in right wrist extension to 60 and flexion to 50 degrees to allow for ease with wiping colt area. Patient will present with increase in right hand digits to full composite flexion to allow for ease with gripping toothbrush.   Patient will present with  strength in the right hand to that of 80% of the contralateral hand in order to be able to perform gripping and carrying of grocery bags. Plan:   Frequency / Duration: Patient will be seen for 1-2 x/week or a total of 8 visits over a 90 day period.   Treatment will include: Manual Therapy, Neuromuscular Re-education, Therapeutic Activities, Therapeutic Exercise, Home Exercise Program instruction, Modalities to include: Ultrasound and hot pack, paraffin and taping    Next session: next size down of tubigrip or taping, begin bulk putty roll and gentle ; bilateral hand/arm roll over flexbar; window alphabet rubs, hand bosu board      Date 3/23/22  3/25/22       Visit # 1/8 2/8       Evaluation Initial  X         STM x  x        MEM x  x        IASTM    x        wrist AROM x         Wrist hand thigh slides   x        tendon glides           Apple picking x  x        HEP issued See table below  pink foam sponge for  and manipulation       Pepper   x     Wrist maze  x     Bilateral tennis ball on frisbee  x     Window washes  x     Hand bosu board       Rice marble sifting  x      pinch and search of beads in putty          Bilateral ball SROM x x     Ball roll on table x x     Bilateral bolster roll  standing and seating     Rock tape applied  I strips, volar and dorsal, distal to proximal pull     Modalities Hot pack, 5 minutes Hot pack, 5 minutes           Charges: 1 TE, 1 MT, 1NM         Total Timed Treatment: 40 minutes    Total Treatment Time: 45 minutes  ONDINA Avila, OTR/L, CHT

## 2022-03-25 ENCOUNTER — TELEPHONE (OUTPATIENT)
Dept: INTERNAL MEDICINE CLINIC | Facility: CLINIC | Age: 57
End: 2022-03-25

## 2022-03-25 ENCOUNTER — OFFICE VISIT (OUTPATIENT)
Dept: OCCUPATIONAL MEDICINE | Age: 57
End: 2022-03-25
Attending: INTERNAL MEDICINE
Payer: COMMERCIAL

## 2022-03-25 PROCEDURE — 97112 NEUROMUSCULAR REEDUCATION: CPT

## 2022-03-25 PROCEDURE — 97110 THERAPEUTIC EXERCISES: CPT

## 2022-03-25 PROCEDURE — 97140 MANUAL THERAPY 1/> REGIONS: CPT

## 2022-03-25 NOTE — TELEPHONE ENCOUNTER
Patient dropped off paperwork from [facility] office of workers compensation programs. Please complete and call patient when ready for pickup.  Forms placed in Dr. Jacquie Villagomez box

## 2022-03-28 ENCOUNTER — OFFICE VISIT (OUTPATIENT)
Dept: OCCUPATIONAL MEDICINE | Age: 57
End: 2022-03-28
Attending: INTERNAL MEDICINE
Payer: COMMERCIAL

## 2022-03-28 PROCEDURE — 97110 THERAPEUTIC EXERCISES: CPT

## 2022-03-28 PROCEDURE — 97530 THERAPEUTIC ACTIVITIES: CPT

## 2022-03-28 PROCEDURE — 97140 MANUAL THERAPY 1/> REGIONS: CPT

## 2022-03-28 NOTE — TELEPHONE ENCOUNTER
Patient is requesting a note to return to work for light duty. Patient is currently not getting paid while she is out and employer informed her they can reassign her to a different position for light duty. Patient is unsure if she can get note placed now by a nurse or if she needs to be seen by a different provider. Patient is aware Dr. Laurel Epley is out. Please advise.

## 2022-03-29 ENCOUNTER — PATIENT MESSAGE (OUTPATIENT)
Dept: INTERNAL MEDICINE CLINIC | Facility: CLINIC | Age: 57
End: 2022-03-29

## 2022-03-30 NOTE — TELEPHONE ENCOUNTER
Dr. Catie Borges is out of the office, Clinical staff, please have another provider help if possible.

## 2022-03-30 NOTE — TELEPHONE ENCOUNTER
From: Enriqueta Clements  To: Suzan Bah  Sent: 3/29/2022 11:57 AM CDT  Subject: Letter for Work     Good Morning Dr. Codi Herron states that she will write the letter for you, but would like to now what you would like the note to say and what restrictions does it need to state. Thank you in advance, and have a nice day.

## 2022-04-04 ENCOUNTER — OFFICE VISIT (OUTPATIENT)
Dept: OCCUPATIONAL MEDICINE | Age: 57
End: 2022-04-04
Attending: INTERNAL MEDICINE
Payer: COMMERCIAL

## 2022-04-04 PROCEDURE — 97530 THERAPEUTIC ACTIVITIES: CPT

## 2022-04-04 PROCEDURE — 97140 MANUAL THERAPY 1/> REGIONS: CPT

## 2022-04-04 PROCEDURE — 97035 APP MDLTY 1+ULTRASOUND EA 15: CPT

## 2022-04-05 ENCOUNTER — OFFICE VISIT (OUTPATIENT)
Dept: INTERNAL MEDICINE CLINIC | Facility: CLINIC | Age: 57
End: 2022-04-05
Payer: COMMERCIAL

## 2022-04-05 ENCOUNTER — TELEPHONE (OUTPATIENT)
Dept: PHYSICAL THERAPY | Facility: HOSPITAL | Age: 57
End: 2022-04-05

## 2022-04-05 ENCOUNTER — MED REC SCAN ONLY (OUTPATIENT)
Dept: INTERNAL MEDICINE CLINIC | Facility: CLINIC | Age: 57
End: 2022-04-05

## 2022-04-05 VITALS
RESPIRATION RATE: 18 BRPM | HEIGHT: 69 IN | BODY MASS INDEX: 33 KG/M2 | DIASTOLIC BLOOD PRESSURE: 80 MMHG | HEART RATE: 106 BPM | SYSTOLIC BLOOD PRESSURE: 118 MMHG

## 2022-04-05 DIAGNOSIS — R20.0 NUMBNESS AND TINGLING IN RIGHT HAND: ICD-10-CM

## 2022-04-05 DIAGNOSIS — S40.021D CONTUSION OF RIGHT UPPER EXTREMITY, SUBSEQUENT ENCOUNTER: Primary | ICD-10-CM

## 2022-04-05 DIAGNOSIS — R20.2 NUMBNESS AND TINGLING IN RIGHT HAND: ICD-10-CM

## 2022-04-05 DIAGNOSIS — Z12.31 BREAST CANCER SCREENING BY MAMMOGRAM: ICD-10-CM

## 2022-04-05 PROCEDURE — 3079F DIAST BP 80-89 MM HG: CPT | Performed by: INTERNAL MEDICINE

## 2022-04-05 PROCEDURE — 99213 OFFICE O/P EST LOW 20 MIN: CPT | Performed by: INTERNAL MEDICINE

## 2022-04-05 PROCEDURE — 3074F SYST BP LT 130 MM HG: CPT | Performed by: INTERNAL MEDICINE

## 2022-04-05 PROCEDURE — 3008F BODY MASS INDEX DOCD: CPT | Performed by: INTERNAL MEDICINE

## 2022-04-05 NOTE — PATIENT INSTRUCTIONS
[email or web address]  Address: [address] [facility], [facility], [address] [place] [address]

## 2022-04-05 NOTE — ASSESSMENT & PLAN NOTE
Continue light duty while she is doing therapy and until she sees orthopedist.  Note written for work. Continue Tylenol as needed.

## 2022-04-06 ENCOUNTER — TELEPHONE (OUTPATIENT)
Dept: INTERNAL MEDICINE CLINIC | Facility: CLINIC | Age: 57
End: 2022-04-06

## 2022-04-06 ENCOUNTER — APPOINTMENT (OUTPATIENT)
Dept: OCCUPATIONAL MEDICINE | Age: 57
End: 2022-04-06
Attending: INTERNAL MEDICINE
Payer: COMMERCIAL

## 2022-04-06 NOTE — TELEPHONE ENCOUNTER
Patient dropped off form from [address] of Labor Attending physician's form.  Scanned to forms

## 2022-04-07 ENCOUNTER — OFFICE VISIT (OUTPATIENT)
Dept: PHYSICAL THERAPY | Age: 57
End: 2022-04-07
Payer: COMMERCIAL

## 2022-04-07 PROCEDURE — 97035 APP MDLTY 1+ULTRASOUND EA 15: CPT

## 2022-04-07 PROCEDURE — 97530 THERAPEUTIC ACTIVITIES: CPT

## 2022-04-11 RX ORDER — LINACLOTIDE 145 UG/1
CAPSULE, GELATIN COATED ORAL
Qty: 90 CAPSULE | Refills: 1 | Status: SHIPPED | OUTPATIENT
Start: 2022-04-11 | End: 2022-09-14

## 2022-04-13 ENCOUNTER — APPOINTMENT (OUTPATIENT)
Dept: OCCUPATIONAL MEDICINE | Age: 57
End: 2022-04-13
Attending: INTERNAL MEDICINE
Payer: COMMERCIAL

## 2022-04-14 ENCOUNTER — APPOINTMENT (OUTPATIENT)
Dept: PHYSICAL THERAPY | Age: 57
End: 2022-04-14
Attending: PHYSICAL MEDICINE & REHABILITATION
Payer: COMMERCIAL

## 2022-04-14 ENCOUNTER — APPOINTMENT (OUTPATIENT)
Dept: PHYSICAL THERAPY | Age: 57
End: 2022-04-14
Payer: COMMERCIAL

## 2022-04-14 ENCOUNTER — OFFICE VISIT (OUTPATIENT)
Dept: PHYSICAL THERAPY | Age: 57
End: 2022-04-14
Attending: INTERNAL MEDICINE
Payer: OTHER MISCELLANEOUS

## 2022-04-14 PROCEDURE — 97161 PT EVAL LOW COMPLEX 20 MIN: CPT

## 2022-04-14 PROCEDURE — 97110 THERAPEUTIC EXERCISES: CPT

## 2022-04-14 PROCEDURE — 97140 MANUAL THERAPY 1/> REGIONS: CPT

## 2022-04-15 ENCOUNTER — APPOINTMENT (OUTPATIENT)
Dept: OCCUPATIONAL MEDICINE | Age: 57
End: 2022-04-15
Attending: INTERNAL MEDICINE
Payer: COMMERCIAL

## 2022-04-18 ENCOUNTER — APPOINTMENT (OUTPATIENT)
Dept: OCCUPATIONAL MEDICINE | Age: 57
End: 2022-04-18
Attending: INTERNAL MEDICINE
Payer: COMMERCIAL

## 2022-04-19 ENCOUNTER — OFFICE VISIT (OUTPATIENT)
Dept: PHYSICAL THERAPY | Age: 57
End: 2022-04-19
Payer: COMMERCIAL

## 2022-04-19 PROCEDURE — 97035 APP MDLTY 1+ULTRASOUND EA 15: CPT

## 2022-04-19 PROCEDURE — 97530 THERAPEUTIC ACTIVITIES: CPT

## 2022-04-19 PROCEDURE — 97140 MANUAL THERAPY 1/> REGIONS: CPT

## 2022-04-20 ENCOUNTER — APPOINTMENT (OUTPATIENT)
Dept: OCCUPATIONAL MEDICINE | Age: 57
End: 2022-04-20
Attending: INTERNAL MEDICINE
Payer: COMMERCIAL

## 2022-04-21 ENCOUNTER — OFFICE VISIT (OUTPATIENT)
Dept: PHYSICAL THERAPY | Age: 57
End: 2022-04-21
Payer: COMMERCIAL

## 2022-04-21 ENCOUNTER — HOSPITAL ENCOUNTER (OUTPATIENT)
Dept: MAMMOGRAPHY | Facility: HOSPITAL | Age: 57
Discharge: HOME | End: 2022-04-21
Attending: INTERNAL MEDICINE
Payer: COMMERCIAL

## 2022-04-21 DIAGNOSIS — Z12.31 BREAST CANCER SCREENING BY MAMMOGRAM: ICD-10-CM

## 2022-04-21 PROCEDURE — 97530 THERAPEUTIC ACTIVITIES: CPT

## 2022-04-21 PROCEDURE — 97035 APP MDLTY 1+ULTRASOUND EA 15: CPT

## 2022-04-21 PROCEDURE — 77067 SCR MAMMO BI INCL CAD: CPT | Performed by: INTERNAL MEDICINE

## 2022-04-21 PROCEDURE — 77063 BREAST TOMOSYNTHESIS BI: CPT | Performed by: INTERNAL MEDICINE

## 2022-04-21 PROCEDURE — 97140 MANUAL THERAPY 1/> REGIONS: CPT

## 2022-04-21 NOTE — TELEPHONE ENCOUNTER
Dr. Zarco Spine,     Please sign off on form: Fit for duty  -Highlight the patient and hit \"Chart\" button. -In Chart Review, w/in the Encounter tab - click 1 time on the Telephone call encounter for 4/6/22 Scroll down the telephone encounter.  -Click \"scan on\" blue Hyperlink under \"Media\" heading for Fit for Duty Dr Haroldo Oquendo 4/21/22 w/in the telephone enc.  -Click on Acknowledge button at the bottom right corner and left-click onto image, signature stamp appears and drag signature to Provider signature line. Stamp will turn blue. Close window. Thank you,    Elise Cole.

## 2022-04-26 ENCOUNTER — OFFICE VISIT (OUTPATIENT)
Dept: ORTHOPEDICS CLINIC | Facility: CLINIC | Age: 57
End: 2022-04-26
Payer: COMMERCIAL

## 2022-04-26 VITALS — HEIGHT: 69 IN | BODY MASS INDEX: 33 KG/M2

## 2022-04-26 DIAGNOSIS — S40.021D CONTUSION OF RIGHT UPPER EXTREMITY, SUBSEQUENT ENCOUNTER: ICD-10-CM

## 2022-04-26 DIAGNOSIS — T14.8XXA PERIPHERAL NERVE CONTUSION: ICD-10-CM

## 2022-04-26 DIAGNOSIS — S57.81XD CRUSHING INJURY OF RIGHT FOREARM, SUBSEQUENT ENCOUNTER: Primary | ICD-10-CM

## 2022-04-26 PROCEDURE — 99244 OFF/OP CNSLTJ NEW/EST MOD 40: CPT | Performed by: ORTHOPAEDIC SURGERY

## 2022-04-26 RX ORDER — METHYLPREDNISOLONE 4 MG/1
TABLET ORAL
Qty: 1 EACH | Refills: 0 | Status: SHIPPED | OUTPATIENT
Start: 2022-04-26

## 2022-04-26 RX ORDER — OMEPRAZOLE 20 MG/1
20 CAPSULE, DELAYED RELEASE ORAL DAILY
Qty: 7 CAPSULE | Refills: 0 | Status: SHIPPED | OUTPATIENT
Start: 2022-04-26 | End: 2022-05-03

## 2022-05-02 ENCOUNTER — TELEPHONE (OUTPATIENT)
Dept: ORTHOPEDICS CLINIC | Facility: CLINIC | Age: 57
End: 2022-05-02

## 2022-05-02 ENCOUNTER — TELEPHONE (OUTPATIENT)
Dept: PHYSICAL THERAPY | Facility: HOSPITAL | Age: 57
End: 2022-05-02

## 2022-05-03 ENCOUNTER — OFFICE VISIT (OUTPATIENT)
Dept: PHYSICAL THERAPY | Age: 57
End: 2022-05-03
Attending: ORTHOPAEDIC SURGERY
Payer: OTHER MISCELLANEOUS

## 2022-05-03 PROCEDURE — 97140 MANUAL THERAPY 1/> REGIONS: CPT

## 2022-05-03 PROCEDURE — 97035 APP MDLTY 1+ULTRASOUND EA 15: CPT

## 2022-05-03 PROCEDURE — 97530 THERAPEUTIC ACTIVITIES: CPT

## 2022-05-05 ENCOUNTER — OFFICE VISIT (OUTPATIENT)
Dept: PHYSICAL THERAPY | Age: 57
End: 2022-05-05
Attending: ORTHOPAEDIC SURGERY
Payer: OTHER MISCELLANEOUS

## 2022-05-05 PROCEDURE — 97035 APP MDLTY 1+ULTRASOUND EA 15: CPT

## 2022-05-05 PROCEDURE — 97112 NEUROMUSCULAR REEDUCATION: CPT

## 2022-05-05 PROCEDURE — 97140 MANUAL THERAPY 1/> REGIONS: CPT

## 2022-05-08 ENCOUNTER — HOSPITAL ENCOUNTER (OUTPATIENT)
Age: 57
Discharge: HOME | End: 2022-05-08
Payer: COMMERCIAL

## 2022-05-08 VITALS
HEART RATE: 93 BPM | SYSTOLIC BLOOD PRESSURE: 98 MMHG | DIASTOLIC BLOOD PRESSURE: 72 MMHG | RESPIRATION RATE: 18 BRPM | OXYGEN SATURATION: 100 % | TEMPERATURE: 98 F

## 2022-05-08 DIAGNOSIS — B96.89 BACTERIAL VAGINOSIS: ICD-10-CM

## 2022-05-08 DIAGNOSIS — N76.0 BACTERIAL VAGINOSIS: ICD-10-CM

## 2022-05-08 DIAGNOSIS — L50.9 URTICARIA: Primary | ICD-10-CM

## 2022-05-08 PROCEDURE — 99213 OFFICE O/P EST LOW 20 MIN: CPT | Performed by: NURSE PRACTITIONER

## 2022-05-08 RX ORDER — PREDNISONE 20 MG/1
40 TABLET ORAL DAILY
Qty: 8 TABLET | Refills: 0 | Status: SHIPPED | OUTPATIENT
Start: 2022-05-08 | End: 2022-05-12

## 2022-05-08 RX ORDER — CLINDAMYCIN PHOSPHATE 20 MG/G
1 CREAM VAGINAL NIGHTLY
Qty: 40 G | Refills: 0 | Status: SHIPPED | OUTPATIENT
Start: 2022-05-08 | End: 2022-05-15

## 2022-05-08 NOTE — ED INITIAL ASSESSMENT (HPI)
Pt reports vaginal odor x2 days, not itchy, no discharge, no lesions. Denies concern for sti. Pt also reports that yesterday she noted a rash on her neck, itchy. Denies bites, new creams, fever, oral swelling. Rash localized only to center area over clavicle.

## 2022-05-10 ENCOUNTER — OFFICE VISIT (OUTPATIENT)
Dept: PHYSICAL THERAPY | Age: 57
End: 2022-05-10
Attending: ORTHOPAEDIC SURGERY
Payer: OTHER MISCELLANEOUS

## 2022-05-10 PROCEDURE — 97112 NEUROMUSCULAR REEDUCATION: CPT

## 2022-05-10 PROCEDURE — 97035 APP MDLTY 1+ULTRASOUND EA 15: CPT

## 2022-05-10 PROCEDURE — 97140 MANUAL THERAPY 1/> REGIONS: CPT

## 2022-05-19 ENCOUNTER — OFFICE VISIT (OUTPATIENT)
Dept: INTERNAL MEDICINE CLINIC | Facility: CLINIC | Age: 57
End: 2022-05-19

## 2022-05-19 VITALS
HEIGHT: 67 IN | DIASTOLIC BLOOD PRESSURE: 84 MMHG | HEART RATE: 120 BPM | SYSTOLIC BLOOD PRESSURE: 143 MMHG | BODY MASS INDEX: 35 KG/M2

## 2022-05-19 DIAGNOSIS — R20.0 NUMBNESS AND TINGLING IN RIGHT HAND: ICD-10-CM

## 2022-05-19 DIAGNOSIS — R20.2 NUMBNESS AND TINGLING IN RIGHT HAND: ICD-10-CM

## 2022-05-19 DIAGNOSIS — S40.021D CONTUSION OF RIGHT UPPER EXTREMITY, SUBSEQUENT ENCOUNTER: Primary | ICD-10-CM

## 2022-05-19 PROBLEM — J45.20 MILD INTERMITTENT ASTHMA WITHOUT COMPLICATION (HCC): Status: RESOLVED | Noted: 2020-01-09 | Resolved: 2022-05-19

## 2022-05-19 PROBLEM — J45.20 MILD INTERMITTENT ASTHMA WITHOUT COMPLICATION: Status: RESOLVED | Noted: 2020-01-09 | Resolved: 2022-05-19

## 2022-05-19 PROBLEM — S43.422A SPRAIN OF LEFT ROTATOR CUFF CAPSULE: Status: RESOLVED | Noted: 2021-09-30 | Resolved: 2022-05-19

## 2022-05-19 PROBLEM — L25.9 CONTACT DERMATITIS: Status: RESOLVED | Noted: 2020-08-05 | Resolved: 2022-05-19

## 2022-05-19 PROCEDURE — 3079F DIAST BP 80-89 MM HG: CPT | Performed by: INTERNAL MEDICINE

## 2022-05-19 PROCEDURE — 3077F SYST BP >= 140 MM HG: CPT | Performed by: INTERNAL MEDICINE

## 2022-05-19 PROCEDURE — 3008F BODY MASS INDEX DOCD: CPT | Performed by: INTERNAL MEDICINE

## 2022-05-19 PROCEDURE — 99213 OFFICE O/P EST LOW 20 MIN: CPT | Performed by: INTERNAL MEDICINE

## 2022-05-20 ENCOUNTER — HOSPITAL ENCOUNTER (OUTPATIENT)
Dept: MRI IMAGING | Facility: HOSPITAL | Age: 57
Discharge: HOME | End: 2022-05-20
Attending: ORTHOPAEDIC SURGERY
Payer: COMMERCIAL

## 2022-05-20 DIAGNOSIS — T14.8XXA PERIPHERAL NERVE CONTUSION: ICD-10-CM

## 2022-05-20 DIAGNOSIS — S40.021D CONTUSION OF RIGHT UPPER EXTREMITY, SUBSEQUENT ENCOUNTER: ICD-10-CM

## 2022-05-20 DIAGNOSIS — S57.81XD CRUSHING INJURY OF RIGHT FOREARM, SUBSEQUENT ENCOUNTER: ICD-10-CM

## 2022-05-20 PROCEDURE — 73218 MRI UPPER EXTREMITY W/O DYE: CPT | Performed by: ORTHOPAEDIC SURGERY

## 2022-05-24 ENCOUNTER — OFFICE VISIT (OUTPATIENT)
Dept: PHYSICAL THERAPY | Age: 57
End: 2022-05-24
Attending: ORTHOPAEDIC SURGERY
Payer: OTHER MISCELLANEOUS

## 2022-05-24 PROCEDURE — 97035 APP MDLTY 1+ULTRASOUND EA 15: CPT

## 2022-05-24 PROCEDURE — 97112 NEUROMUSCULAR REEDUCATION: CPT

## 2022-05-26 ENCOUNTER — OFFICE VISIT (OUTPATIENT)
Dept: INTERNAL MEDICINE CLINIC | Facility: CLINIC | Age: 57
End: 2022-05-26
Payer: COMMERCIAL

## 2022-05-26 ENCOUNTER — APPOINTMENT (OUTPATIENT)
Dept: PHYSICAL THERAPY | Age: 57
End: 2022-05-26
Attending: ORTHOPAEDIC SURGERY
Payer: OTHER MISCELLANEOUS

## 2022-05-26 VITALS
BODY MASS INDEX: 33 KG/M2 | HEART RATE: 74 BPM | HEIGHT: 69 IN | SYSTOLIC BLOOD PRESSURE: 121 MMHG | DIASTOLIC BLOOD PRESSURE: 64 MMHG

## 2022-05-26 DIAGNOSIS — Z00.00 ROUTINE HEALTH MAINTENANCE: Primary | ICD-10-CM

## 2022-05-26 DIAGNOSIS — J45.40 MODERATE PERSISTENT ASTHMA WITHOUT COMPLICATION: ICD-10-CM

## 2022-05-26 DIAGNOSIS — I10 ESSENTIAL HYPERTENSION: ICD-10-CM

## 2022-05-26 DIAGNOSIS — Z52.4 KIDNEY DONOR: ICD-10-CM

## 2022-05-26 DIAGNOSIS — K58.1 IRRITABLE BOWEL SYNDROME WITH CONSTIPATION: ICD-10-CM

## 2022-05-26 PROBLEM — M54.6 ACUTE LEFT-SIDED THORACIC BACK PAIN: Status: RESOLVED | Noted: 2021-11-09 | Resolved: 2022-05-26

## 2022-05-26 PROBLEM — N95.0 POST-MENOPAUSAL BLEEDING: Status: RESOLVED | Noted: 2021-02-09 | Resolved: 2022-05-26

## 2022-05-26 PROBLEM — F41.9 SEVERE ANXIETY: Status: RESOLVED | Noted: 2021-10-20 | Resolved: 2022-05-26

## 2022-05-26 PROBLEM — J45.20 MILD INTERMITTENT ASTHMA WITHOUT COMPLICATION: Status: RESOLVED | Noted: 2020-01-09 | Resolved: 2022-05-26

## 2022-05-26 PROBLEM — J45.20 MILD INTERMITTENT ASTHMA WITHOUT COMPLICATION (HCC): Status: RESOLVED | Noted: 2020-01-09 | Resolved: 2022-05-26

## 2022-05-26 PROCEDURE — 90677 PCV20 VACCINE IM: CPT | Performed by: INTERNAL MEDICINE

## 2022-05-26 PROCEDURE — 3078F DIAST BP <80 MM HG: CPT | Performed by: INTERNAL MEDICINE

## 2022-05-26 PROCEDURE — 3008F BODY MASS INDEX DOCD: CPT | Performed by: INTERNAL MEDICINE

## 2022-05-26 PROCEDURE — 99396 PREV VISIT EST AGE 40-64: CPT | Performed by: INTERNAL MEDICINE

## 2022-05-26 PROCEDURE — 3074F SYST BP LT 130 MM HG: CPT | Performed by: INTERNAL MEDICINE

## 2022-05-26 PROCEDURE — 90471 IMMUNIZATION ADMIN: CPT | Performed by: INTERNAL MEDICINE

## 2022-05-26 RX ORDER — DIETHYLPROPION HYDROCHLORIDE 75 MG/1
1 TABLET ORAL DAILY
COMMUNITY
Start: 2022-05-20

## 2022-05-26 NOTE — ASSESSMENT & PLAN NOTE
Unremarkable exam.  Pt's colon cancer screening is up to date. Immunizations were reviewed. Counseled pt regarding diet and exercise.   She will see gyne for annual exam.

## 2022-05-31 ENCOUNTER — APPOINTMENT (OUTPATIENT)
Dept: PHYSICAL THERAPY | Age: 57
End: 2022-05-31
Attending: ORTHOPAEDIC SURGERY
Payer: OTHER MISCELLANEOUS

## 2022-05-31 ENCOUNTER — OFFICE VISIT (OUTPATIENT)
Dept: ORTHOPEDICS CLINIC | Facility: CLINIC | Age: 57
End: 2022-05-31
Payer: OTHER MISCELLANEOUS

## 2022-05-31 DIAGNOSIS — R20.2 NUMBNESS AND TINGLING IN RIGHT HAND: ICD-10-CM

## 2022-05-31 DIAGNOSIS — S57.81XD CRUSHING INJURY OF RIGHT FOREARM, SUBSEQUENT ENCOUNTER: Primary | ICD-10-CM

## 2022-05-31 DIAGNOSIS — R20.0 NUMBNESS AND TINGLING IN RIGHT HAND: ICD-10-CM

## 2022-05-31 DIAGNOSIS — S40.021D CONTUSION OF RIGHT UPPER EXTREMITY, SUBSEQUENT ENCOUNTER: ICD-10-CM

## 2022-05-31 DIAGNOSIS — T14.8XXA PERIPHERAL NERVE CONTUSION: ICD-10-CM

## 2022-05-31 PROCEDURE — 99214 OFFICE O/P EST MOD 30 MIN: CPT | Performed by: ORTHOPAEDIC SURGERY

## 2022-05-31 RX ORDER — LIDOCAINE AND PRILOCAINE 25; 25 MG/G; MG/G
CREAM TOPICAL 3 TIMES DAILY PRN
Status: SHIPPED | OUTPATIENT
Start: 2022-05-31

## 2022-05-31 RX ORDER — GABAPENTIN 300 MG/1
300 CAPSULE ORAL 2 TIMES DAILY
Qty: 60 CAPSULE | Refills: 0 | Status: SHIPPED | OUTPATIENT
Start: 2022-05-31

## 2022-06-02 ENCOUNTER — APPOINTMENT (OUTPATIENT)
Dept: PHYSICAL THERAPY | Age: 57
End: 2022-06-02
Attending: ORTHOPAEDIC SURGERY

## 2022-06-07 ENCOUNTER — TELEPHONE (OUTPATIENT)
Dept: DERMATOLOGY CLINIC | Facility: CLINIC | Age: 57
End: 2022-06-07

## 2022-06-07 ENCOUNTER — APPOINTMENT (OUTPATIENT)
Dept: URBAN - METROPOLITAN AREA CLINIC 244 | Age: 57
Setting detail: DERMATOLOGY
End: 2022-06-09

## 2022-06-07 DIAGNOSIS — L30.9 DERMATITIS, UNSPECIFIED: ICD-10-CM

## 2022-06-07 PROCEDURE — OTHER PRESCRIPTION: OTHER

## 2022-06-07 PROCEDURE — OTHER DEFER: OTHER

## 2022-06-07 PROCEDURE — OTHER COUNSELING: OTHER

## 2022-06-07 PROCEDURE — 99203 OFFICE O/P NEW LOW 30 MIN: CPT

## 2022-06-07 RX ORDER — TACROLIMUS 1 MG/G
OINTMENT TOPICAL
Qty: 60 | Refills: 0 | Status: ERX | COMMUNITY
Start: 2022-06-07

## 2022-06-07 ASSESSMENT — LOCATION DETAILED DESCRIPTION DERM
LOCATION DETAILED: RIGHT INFERIOR LATERAL NECK
LOCATION DETAILED: LEFT INFERIOR LATERAL NECK

## 2022-06-07 ASSESSMENT — LOCATION SIMPLE DESCRIPTION DERM
LOCATION SIMPLE: LEFT ANTERIOR NECK
LOCATION SIMPLE: RIGHT ANTERIOR NECK

## 2022-06-07 ASSESSMENT — LOCATION ZONE DERM: LOCATION ZONE: NECK

## 2022-06-07 NOTE — PROCEDURE: DEFER
Introduction Text (Please End With A Colon): The following procedure was deferred:
Procedure To Be Performed At Next Visit: Biopsy by punch method
Instructions (Optional): 3mm
Detail Level: Detailed

## 2022-06-07 NOTE — TELEPHONE ENCOUNTER
Patient called    Asking to be seen asap for a rash. States cannot wait until August for next opening. Does not have a scheduled appointment.  Please advise

## 2022-06-21 ENCOUNTER — APPOINTMENT (OUTPATIENT)
Dept: URBAN - METROPOLITAN AREA CLINIC 244 | Age: 57
Setting detail: DERMATOLOGY
End: 2022-06-22

## 2022-06-21 DIAGNOSIS — R21 RASH AND OTHER NONSPECIFIC SKIN ERUPTION: ICD-10-CM

## 2022-06-21 PROCEDURE — OTHER BIOPSY BY PUNCH METHOD: OTHER

## 2022-06-21 PROCEDURE — 11104 PUNCH BX SKIN SINGLE LESION: CPT

## 2022-06-21 PROCEDURE — OTHER COUNSELING: OTHER

## 2022-06-21 ASSESSMENT — LOCATION DETAILED DESCRIPTION DERM: LOCATION DETAILED: LEFT DISTAL DORSAL FOREARM

## 2022-06-21 ASSESSMENT — LOCATION SIMPLE DESCRIPTION DERM: LOCATION SIMPLE: LEFT FOREARM

## 2022-06-21 ASSESSMENT — LOCATION ZONE DERM: LOCATION ZONE: ARM

## 2022-06-21 NOTE — PROGRESS NOTES
OT Discharge Note    S/O: Patient cancelled 5/26 and 5/31; requested to return via patient portal on 6/10/22, but no OT appointments were or have been available. Status is as last noted on 5/24/22. A/P: Patient is being discharged from OT services since last seen as noted. She may be re-evaluated if status changes and/or new orders are received.   Adriana Chnag, ONDINA, OTR/L, CHT

## 2022-06-21 NOTE — PROCEDURE: BIOPSY BY PUNCH METHOD
Bill For Surgical Tray: no
Epidermal Sutures: 5-0 Nylon
Notification Instructions: Patient will be notified of biopsy results. However, patient instructed to call the office if not contacted within 2 weeks.
X Size Of Lesion In Cm (Optional): 0
Home Suture Removal Text: Patient will remove their sutures at home.  If they have any questions or difficulties they will call the office.
Validate Note Data (See Information Below): Yes
Anesthesia Type: 0.5% lidocaine with 1:200,000 epinephrine and a 1:10 solution of 8.4% sodium bicarbonate
Path Notes (To The Dermatopathologist): Non-specific rash - ddx:
Dressing: bandage
Consent: Written consent was obtained and risks were reviewed including but not limited to scarring, infection, bleeding, scabbing, incomplete removal, nerve damage and allergy to anesthesia.
Biopsy Type: H and E
Suture Removal: 14 days
Post-Care Instructions: I reviewed with the patient in detail post-care instructions. Patient is to keep the biopsy site dry overnight, and then apply petrolatum twice daily until healed. Patient may apply hydrogen peroxide soaks to remove any crusting.
Billing Type: Third-Party Bill
Wound Care: Petrolatum
Detail Level: Detailed
Punch Size In Mm: 3
Information: Selecting Yes will display possible errors in your note based on the variables you have selected. This validation is only offered as a suggestion for you. PLEASE NOTE THAT THE VALIDATION TEXT WILL BE REMOVED WHEN YOU FINALIZE YOUR NOTE. IF YOU WANT TO FAX A PRELIMINARY NOTE YOU WILL NEED TO TOGGLE THIS TO 'NO' IF YOU DO NOT WANT IT IN YOUR FAXED NOTE.
Hemostasis: None
Anesthesia Volume In Cc (Will Not Render If 0): 0.5

## 2022-06-23 ENCOUNTER — TELEPHONE (OUTPATIENT)
Dept: PHYSICAL THERAPY | Facility: HOSPITAL | Age: 57
End: 2022-06-23

## 2022-06-27 ENCOUNTER — TELEPHONE (OUTPATIENT)
Dept: PHYSICAL THERAPY | Facility: HOSPITAL | Age: 57
End: 2022-06-27

## 2022-06-28 ENCOUNTER — OFFICE VISIT (OUTPATIENT)
Dept: PHYSICAL THERAPY | Age: 57
End: 2022-06-28
Payer: OTHER MISCELLANEOUS

## 2022-06-28 DIAGNOSIS — S40.021D CONTUSION OF RIGHT UPPER EXTREMITY, SUBSEQUENT ENCOUNTER: ICD-10-CM

## 2022-06-28 DIAGNOSIS — S57.81XD CRUSHING INJURY OF RIGHT FOREARM, SUBSEQUENT ENCOUNTER: ICD-10-CM

## 2022-06-28 DIAGNOSIS — T14.8XXA PERIPHERAL NERVE CONTUSION: ICD-10-CM

## 2022-06-28 PROCEDURE — 97110 THERAPEUTIC EXERCISES: CPT

## 2022-06-28 PROCEDURE — 97165 OT EVAL LOW COMPLEX 30 MIN: CPT

## 2022-06-28 PROCEDURE — 97140 MANUAL THERAPY 1/> REGIONS: CPT

## 2022-06-29 ENCOUNTER — PATIENT MESSAGE (OUTPATIENT)
Dept: DERMATOLOGY CLINIC | Facility: CLINIC | Age: 57
End: 2022-06-29

## 2022-06-29 NOTE — PROGRESS NOTES
Dx:  Associated DX:  Crushing injury of right forearm, subsequent encounter (S57.81XD)  Peripheral nerve contusion (T14.8XXA)  Contusion of right upper extremity, subsequent encounter (S40.021D)  Numbness and tingling in right hand (R20.0,R20.2)  [place] [facility] (Authorized # of Visits): Authorizing Physician: Dr. Luz Maria Etienne  Next MD visit: 8/1/2022  Fall Risk: standard         Precautions:  none         Date of initial eval: 3/23/22  Order Date:  5/31/2022  Authorizing Provider: Brandon Dang  Procedure:  OCCUPATIONAL THERAPY - INTERNAL [[id number]]         Order #:   [id number]  Qty:  1    Order summary:  IHP - RFL, Routine, 3 visits       Comments:  Continue range of motion and strengthening. No splinting. Subjective:  Pt states that her pain level is less today because she is resting more and taking more breaks. Pain:    1/10    Objective:     See tx log below        Assessment: Decrease in subjective complaint of pain as pt has been modifying workload and taking more rest breaks as advised. Pt continues to have point tenderness at volar aspect of mid forearm. Discussed possible dry needling as treatment option f pain continues to persist. Pt also instructed to bring in picture of work stations to discuss modifications. Pt agreed. Goals:  (to be met in 8 visits)   Patient will be independent and compliant with comprehensive HEP to maintain progress achieved in OT. (progressing)  Patient will present with increase in right hand digits to full composite flexion to allow for ease with gripping toothbrush. (met for ROM, not met for task)  Patient will present with  strength in the right hand to that of 80% of the contralateral hand in order to be able to perform gripping and carrying of grocery bags. (Progressing)  Pt will be able to engage in proper body mechanics while sitting at work station to allow for efficeincey and accuracy of work demands by 80%.       Plan:   Frequency / Duration: Patient will be seen for 1-2 x/week or a total of 8 additional visits over a 90 day period. Treatment will include: Manual Therapy, Neuromuscular Re-education, Therapeutic Activities, Therapeutic Exercise, Home Exercise Program instruction, Modalities prn. Next session:    Median nerve glides, mirror therapy, graded motor imagery. Monitor MRI results, work station set up/ergonomics, isometrics    Treatment Log:  Date 4/21/22 5/3/22 5/5/22 5/10/22 5/24/22 6/28/22 6/30/22   Visit # 7/8 8/16 9/16 10/16 11/16 1/8 2/8   Evaluation           X     STM X, MFR MFR MFR X MFR  X MFR x    MEM x      x    IASTM x x x x   x   Wrist maze x      x   Bilateral tennis ball on frisbee unilateral   Unilateral in mirror   x   Window washes     Reviewed for HEP     Hand bosu board          Rice marble sifting           pinch and search of beads in putty          Ball walk on wall          Table top hand/arm extended stretches x         Ball roll on table       x   Bulk putty , twist yellow   x      Bilateral bulk putty roll yellow   x      sponges       Abd/add in supination and pronation x 3 minutes each   Yarn twisting          Rock tape application  declined   Kinesiotape: star method applied to volar mid forearm for tissue elevation. Power web stretches 10x each direction      x   Bilateral bolster roll          Modalities Hot pack 5 min warm up;  Ultrasound:  3mHz  0.8w/cm2  50%  to volar mid forearm  for 8 minutes. Hot pack 5 min warm up;  Ultrasound:  3mHz  0.8w/cm2  50%  to volar mid forearm  for 8 minutes. Hot pack 5 min warm up;  Ultrasound:  3mHz  0.8w/cm2  50%  to volar mid forearm  for 8 minutes. Ultrasound:  3mHz  0.6w/cm2  100%  to volar mid forearm  for 8 minutes. Ultrasound:  3mHz  0.6w/cm2  100%  to volar mid forearm  for 8 minutes.     MHP x 5 minutes, hand and forearm   SCi Fit          Wrist roller velcro board  wrist         Median nerve glides 10x x x 10x x x    Ulnar nerve glides 10x  x  x Radial nerve glides 10x  x  x     Clothespin pinches   With mirror, yellow, unilateral to bilateral       PNF diagonals 1#, 10x each diagonal, standing   Short arc, with mirror      Cupping   x x      thumb plunger for [employer] activation   1 minute       Forearm rotation  1 minute, forearm wand, 1 weight on end X in mirror, unilateral, RUE resting behind mirror x      Mirror therapy    with gripping of foam sponge, unilateral to bilateral; see also above X foam sponge, chinese balls, power web      IFC     Intensity 7, increased to 7.5 after 2 minutes; 7 minutes total     Flexbar pertrubations       2 min in neutral, 2 min in pronation       Patient was instructed in and issued a HEP.   HEP Date issued  Date amended Date discharged  Comments (HEP2Go code if used)   Median nerve glides 6/28/22                                                  Charges:  1MT, 2TE  Total Timed Treatment:45 minutes Total Treatment Time: 50 minutes  Isabela Gomez, OTR/L

## 2022-06-30 ENCOUNTER — OFFICE VISIT (OUTPATIENT)
Dept: PHYSICAL THERAPY | Age: 57
End: 2022-06-30
Payer: OTHER MISCELLANEOUS

## 2022-06-30 ENCOUNTER — PATIENT MESSAGE (OUTPATIENT)
Dept: DERMATOLOGY CLINIC | Facility: CLINIC | Age: 57
End: 2022-06-30

## 2022-06-30 PROCEDURE — 97110 THERAPEUTIC EXERCISES: CPT

## 2022-06-30 PROCEDURE — 97140 MANUAL THERAPY 1/> REGIONS: CPT

## 2022-06-30 NOTE — TELEPHONE ENCOUNTER
From: Mane Borges  To: Sasha Gallegos MD  Sent: 6/29/2022 11:15 PM CDT  Subject: Rash    Hello Dr. Nighat Ortiz I am taking the cream you prescribed, but tge rash is spreading over my arms not sure what this is. I am attaching pictures of my arms. It is very itchy at night even with zyrtec. I think I really need to be seen.  Thanks fot taking time to even send medication

## 2022-07-01 ENCOUNTER — LAB ENCOUNTER (OUTPATIENT)
Dept: LAB | Facility: HOSPITAL | Age: 57
End: 2022-07-01
Attending: INTERNAL MEDICINE
Payer: COMMERCIAL

## 2022-07-01 DIAGNOSIS — Z00.00 ROUTINE HEALTH MAINTENANCE: ICD-10-CM

## 2022-07-01 LAB
ALBUMIN SERPL-MCNC: 3.6 G/DL (ref 3.4–5)
ALBUMIN/GLOB SERPL: 0.9 {RATIO} (ref 1–2)
ALP LIVER SERPL-CCNC: 97 U/L
ALT SERPL-CCNC: 21 U/L
ANION GAP SERPL CALC-SCNC: 3 MMOL/L (ref 0–18)
AST SERPL-CCNC: 19 U/L (ref 15–37)
BILIRUB SERPL-MCNC: 0.6 MG/DL (ref 0.1–2)
BUN BLD-MCNC: 15 MG/DL (ref 7–18)
BUN/CREAT SERPL: 13.6 (ref 10–20)
CALCIUM BLD-MCNC: 9.2 MG/DL (ref 8.5–10.1)
CHLORIDE SERPL-SCNC: 111 MMOL/L (ref 98–112)
CHOLEST SERPL-MCNC: 144 MG/DL (ref ?–200)
CO2 SERPL-SCNC: 28 MMOL/L (ref 21–32)
CREAT BLD-MCNC: 1.1 MG/DL
FASTING PATIENT LIPID ANSWER: YES
GLOBULIN PLAS-MCNC: 3.8 G/DL (ref 2.8–4.4)
GLUCOSE BLD-MCNC: 86 MG/DL (ref 70–99)
HDLC SERPL-MCNC: 48 MG/DL (ref 40–59)
LDLC SERPL CALC-MCNC: 80 MG/DL (ref ?–100)
NONHDLC SERPL-MCNC: 96 MG/DL (ref ?–130)
OSMOLALITY SERPL CALC.SUM OF ELEC: 294 MOSM/KG (ref 275–295)
POTASSIUM SERPL-SCNC: 3.9 MMOL/L (ref 3.5–5.1)
PROT SERPL-MCNC: 7.4 G/DL (ref 6.4–8.2)
SODIUM SERPL-SCNC: 142 MMOL/L (ref 136–145)
TRIGL SERPL-MCNC: 81 MG/DL (ref 30–149)
TSI SER-ACNC: 2.81 MIU/ML (ref 0.36–3.74)
VLDLC SERPL CALC-MCNC: 13 MG/DL (ref 0–30)

## 2022-07-01 PROCEDURE — 80061 LIPID PANEL: CPT

## 2022-07-01 PROCEDURE — 80053 COMPREHEN METABOLIC PANEL: CPT

## 2022-07-01 PROCEDURE — 84443 ASSAY THYROID STIM HORMONE: CPT

## 2022-07-01 PROCEDURE — 36415 COLL VENOUS BLD VENIPUNCTURE: CPT

## 2022-07-01 NOTE — PROGRESS NOTES
Dx:  Associated DX:  Crushing injury of right forearm, subsequent encounter (S57.81XD)  Peripheral nerve contusion (T14.8XXA)  Contusion of right upper extremity, subsequent encounter (S40.021D)  Numbness and tingling in right hand (R20.0,R20.2)  [place] [facility] (Authorized # of Visits): Authorizing Physician: Dr. Krystal Newman MD visit: 8/1/2022  Fall Risk: standard         Precautions:  none         Date of initial eval: 3/23/22  Order Date:  5/31/2022  Authorizing Provider: Steven Mcpherson  Procedure:  OCCUPATIONAL THERAPY - INTERNAL [[id number]]         Order #:   [id number]  Qty:  1    Order summary:  IHP - RFL, Routine, 3 visits       Comments:  Continue range of motion and strengthening. No splinting. Subjective:  Pt states that her forearm still doesn't like pressure and certain types of touch. Pain:    2/10    Objective:     See tx log below        Assessment: Patient continues to have discomfort with pressure at volar forearm. Pt however able to tolerate minimal pressure at this time. Issued work station handout for pt to make necessary modifications at work. No increase in symptoms during session during manual therapy or application of ultrasound. Goals:  (to be met in 8 visits)   Patient will be independent and compliant with comprehensive HEP to maintain progress achieved in OT. (progressing)  Patient will present with increase in right hand digits to full composite flexion to allow for ease with gripping toothbrush. (met for ROM, not met for task)  Patient will present with  strength in the right hand to that of 80% of the contralateral hand in order to be able to perform gripping and carrying of grocery bags. (Progressing)  Pt will be able to engage in proper body mechanics while sitting at work station to allow for efficeincey and accuracy of work demands by 80%.       Plan:   Frequency / Duration: Patient will be seen for 1-2 x/week or a total of 8 additional visits over a 90 day period. Treatment will include: Manual Therapy, Neuromuscular Re-education, Therapeutic Activities, Therapeutic Exercise, Home Exercise Program instruction, Modalities prn. Next session:  Work station set up/ergonomics, [facility], forearm workout     Treatment Log:  Date 4/21/22 5/3/22 5/5/22 5/10/22 5/24/22 6/28/22 6/30/22 7/05/22   Visit # 7/8 8/16 9/16 10/16 11/16 1/8 2/8 3/8   Evaluation           X      STM X, MFR MFR MFR X MFR  X MFR x     MEM x      x     IASTM x x x x   x    Wrist maze x      x X 3 minutes   Bilateral tennis ball on frisbee unilateral   Unilateral in mirror   x x   Window washes     Reviewed for HEP      Hand bosu board           Rice marble sifting            pinch and search of beads in putty           Ball walk on wall           Table top hand/arm extended stretches x          Ball roll on table       x    Bulk putty , twist yellow   x       Bilateral bulk putty roll yellow   x       Sponges with lumbrical bar        X 3   sponges       Abd/add in supination and pronation x 3 minutes each    Yarn twisting           Rock tape application  declined   Kinesiotape: star method applied to volar mid forearm for tissue elevation. Power web stretches 10x each direction      x    Bilateral bolster roll           Modalities Hot pack 5 min warm up;  Ultrasound:  3mHz  0.8w/cm2  50%  to volar mid forearm  for 8 minutes. Hot pack 5 min warm up;  Ultrasound:  3mHz  0.8w/cm2  50%  to volar mid forearm  for 8 minutes. Hot pack 5 min warm up;  Ultrasound:  3mHz  0.8w/cm2  50%  to volar mid forearm  for 8 minutes. Ultrasound:  3mHz  0.6w/cm2  100%  to volar mid forearm  for 8 minutes. Ultrasound:  3mHz  0.6w/cm2  100%  to volar mid forearm  for 8 minutes. MHP x 5 minutes, hand and forearm MHP x 5 minutes, hand/forearm    Ultrasound:  3mHz  0.8w/cm2  50%  to volar mid forearm  for 8 minutes.    SCi Fit           Wrist roller velcro board  wrist          Median nerve glides 10x x x 10x x x     Ulnar nerve glides 10x  x  x      Radial nerve glides 10x  x  x      Clothespin pinches   With mirror, yellow, unilateral to bilateral        PNF diagonals 1#, 10x each diagonal, standing   Short arc, with mirror       Cupping   x x       thumb plunger for [employer] activation   1 minute        Forearm rotation  1 minute, forearm wand, 1 weight on end X in mirror, unilateral, RUE resting behind mirror x       Mirror therapy    with gripping of foam sponge, unilateral to bilateral; see also above X foam sponge, chinese balls, power web       IFC     Intensity 7, increased to 7.5 after 2 minutes; 7 minutes total      Flexbar pertrubations       2 min in neutral, 2 min in pronation        Patient was instructed in and issued a HEP.   HEP Date issued  Date amended Date discharged  Comments (HEP2Go code if used)   Median nerve glides 6/28/22                                                  Charges:  1MT, 1TE, 1US  Total Timed Treatment: 45 minutes Total Treatment Time: 45 minutes  Chris Hum, OTR/L

## 2022-07-02 ENCOUNTER — OFFICE VISIT (OUTPATIENT)
Dept: DERMATOLOGY | Age: 57
End: 2022-07-02

## 2022-07-02 ENCOUNTER — APPOINTMENT (OUTPATIENT)
Dept: LAB | Age: 57
End: 2022-07-02

## 2022-07-02 ENCOUNTER — LAB REQUISITION (OUTPATIENT)
Dept: LAB | Age: 57
End: 2022-07-02

## 2022-07-02 DIAGNOSIS — L63.0 ALOPECIA TOTALIS: Primary | ICD-10-CM

## 2022-07-02 DIAGNOSIS — L63.0 ALOPECIA (CAPITIS) TOTALIS: ICD-10-CM

## 2022-07-02 LAB
ALT SERPL W/O P-5'-P-CCNC: 18 U/L (ref 4–38)
AST SERPL-CCNC: 23 U/L (ref 14–43)
HBV SURFACE AG SERPL QL IA: NEGATIVE
HEPATITIS C ANTIBODY: NEGATIVE

## 2022-07-02 PROCEDURE — PSEU9049 QUANTIFERON TB PLUS: Performed by: CLINICAL MEDICAL LABORATORY

## 2022-07-02 PROCEDURE — 84460 ALANINE AMINO (ALT) (SGPT): CPT | Performed by: DERMATOLOGY

## 2022-07-02 PROCEDURE — 86803 HEPATITIS C AB TEST: CPT | Performed by: DERMATOLOGY

## 2022-07-02 PROCEDURE — 86480 TB TEST CELL IMMUN MEASURE: CPT | Performed by: CLINICAL MEDICAL LABORATORY

## 2022-07-02 PROCEDURE — 84450 TRANSFERASE (AST) (SGOT): CPT | Performed by: DERMATOLOGY

## 2022-07-02 PROCEDURE — 99203 OFFICE O/P NEW LOW 30 MIN: CPT | Performed by: DERMATOLOGY

## 2022-07-02 PROCEDURE — 86480 TB TEST CELL IMMUN MEASURE: CPT | Performed by: DERMATOLOGY

## 2022-07-02 PROCEDURE — 36415 COLL VENOUS BLD VENIPUNCTURE: CPT | Performed by: DERMATOLOGY

## 2022-07-02 PROCEDURE — 87340 HEPATITIS B SURFACE AG IA: CPT | Performed by: DERMATOLOGY

## 2022-07-02 RX ORDER — SPIRONOLACTONE 50 MG/1
50 TABLET, FILM COATED ORAL 2 TIMES DAILY
Qty: 60 TABLET | Refills: 2 | Status: SHIPPED | OUTPATIENT
Start: 2022-07-02 | End: 2022-09-12 | Stop reason: SDUPTHER

## 2022-07-03 LAB
GAMMA INTERFERON BACKGROUND BLD IA-ACNC: 0.08 IU/ML
GAMMA INTERFERON BACKGROUND BLD IA-ACNC: 0.08 IU/ML
M TB IFN-G BLD-IMP: NEGATIVE
M TB IFN-G BLD-IMP: NEGATIVE
M TB IFN-G CD4+ BCKGRND COR BLD-ACNC: 0 IU/ML
M TB IFN-G CD4+ BCKGRND COR BLD-ACNC: 0 IU/ML
M TB IFN-G CD4+CD8+ BCKGRND COR BLD-ACNC: 0 IU/ML
M TB IFN-G CD4+CD8+ BCKGRND COR BLD-ACNC: 0 IU/ML
MITOGEN IGNF BCKGRD COR BLD-ACNC: >10 IU/ML
MITOGEN IGNF BCKGRD COR BLD-ACNC: >10 IU/ML

## 2022-07-05 ENCOUNTER — TELEPHONE (OUTPATIENT)
Dept: DERMATOLOGY | Age: 57
End: 2022-07-05

## 2022-07-05 ENCOUNTER — OFFICE VISIT (OUTPATIENT)
Dept: PHYSICAL THERAPY | Age: 57
End: 2022-07-05
Payer: OTHER MISCELLANEOUS

## 2022-07-05 PROCEDURE — 97140 MANUAL THERAPY 1/> REGIONS: CPT

## 2022-07-05 PROCEDURE — 97035 APP MDLTY 1+ULTRASOUND EA 15: CPT

## 2022-07-05 PROCEDURE — 97112 NEUROMUSCULAR REEDUCATION: CPT

## 2022-07-05 NOTE — TELEPHONE ENCOUNTER
This message sent to me 7/1/22. No openings available. Pt had appt already with outside MD.  Seen on 7/2/22-looks like pt seen by outside derm for hair loss 7/2. Giuliano Jeffers at [facility] [place] notes    6 /7 patient had sent a message Hayden Quintana talk to her recent triamcinolone treatment seen in urgent care prior to that states was seen by someone else we have no record of that so I am not sure why she did not follow-up with the dermatology had seen 6/ 7/22   She had been offered appointments then and refused. Had been seen by Dr. Naeem S[facility] clinic on 7/2/2022 should have been addressed then, there are no messages to her PCP about this either. Now she is starting [place] with new Derm. Is she following there? We do need to work around what openings we have. GENIE, JENNIFER-BRET/Vinod earlier this year negative    Pt has not sent any photos.   Please bump this up to a supervisor

## 2022-07-06 NOTE — TELEPHONE ENCOUNTER
S/w pt. She wants to follow up with you, states she saw Dr. Kasia Harrell once for alopecia, states she didn't \"realize you also treat alopecia\". Cancellation found on 7/11/22, appt made. Pt states Dr. Tiera Crooks \"is my regular doctor and I definitely want to follow up with her\".

## 2022-07-06 NOTE — PROGRESS NOTES
Dx:  Associated DX:  Crushing injury of right forearm, subsequent encounter (S57.81XD)  Peripheral nerve contusion (T14.8XXA)  Contusion of right upper extremity, subsequent encounter (S40.021D)  Numbness and tingling in right hand (R20.0,R20.2)  [place] [facility] (Authorized # of Visits): Authorizing Physician: Dr. Stanley Newman MD visit: 8/1/2022  Fall Risk: standard         Precautions:  none         Date of initial eval: 3/23/22  Order Date:  5/31/2022  Authorizing Provider: Susan Brannon  Procedure:  OCCUPATIONAL THERAPY - INTERNAL [[id number]]         Order #:   [id number]  Qty:  1    Order summary:  IHP - RFL, Routine, 3 visits       Comments:  Continue range of motion and strengthening. No splinting. Subjective:  Pt states that she taking more rest breaks at work which has helped with managing the pain. Pt states that she noticed that her neck goes down when she is at the computer. Pain:    2/10    Objective:     See tx log below    Assessment   Discussed proper body mechanics when sitting at work station. Patient brought in photo of work station and discussed guidelines and proper setup to minimize stress to body. Pt verbalized good understanding. Issued computer and desk stretches for pt to perform at work. Pt able to demonstrate good understanding of stretches in clinic. Pt continuing to have pain with pressure at forearm. Pt reports taking gabapentin as needed per MD for pain management. Goals:  (to be met in 8 visits)   Patient will be independent and compliant with comprehensive HEP to maintain progress achieved in OT. (progressing)  Patient will present with increase in right hand digits to full composite flexion to allow for ease with gripping toothbrush. (met for ROM, not met for task)  Patient will present with  strength in the right hand to that of 80% of the contralateral hand in order to be able to perform gripping and carrying of grocery bags.   (Progressing)  Pt will be able to engage in proper body mechanics while sitting at work station to allow for efficeincey and accuracy of work demands by 80%. Plan:   Frequency / Duration: Patient will be seen for 1-2 x/week or a total of 8 additional visits over a 90 day period. Treatment will include: Manual Therapy, Neuromuscular Re-education, Therapeutic Activities, Therapeutic Exercise, Home Exercise Program instruction, Modalities prn. Next session:  Work station set up/ergonomics, [address] [place] [address], forearm workout     Treatment Log:  Date 4/21/22 5/3/22 5/5/22 5/10/22 5/24/22 6/28/22 6/30/22 7/05/22 7/07/22   Visit # 7/8 8/16 9/16 10/16 11/16 1/8 2/8 3/8 4/8   Evaluation           X       STM X, MFR MFR MFR X MFR  X MFR x      MEM x      x      IASTM x x x x   x     Wrist maze x      x X 3 minutes    Bilateral tennis ball on frisbee unilateral   Unilateral in mirror   x x    Window washes     Reviewed for HEP       Hand bosu board            Rice marble sifting             pinch and search of beads in putty            Ball walk on wall            Table top hand/arm extended stretches x           Ball roll on table       x     Bulk putty , twist yellow   x        Bilateral bulk putty roll yellow   x        Sponges with lumbrical bar        X 3    powerweb pertrubations         X 3 minutes with elbow extended   Computer and desk stretches         x3   sponges       Abd/add in supination and pronation x 3 minutes each     Yarn twisting            Rock tape application  declined   Kinesiotape: star method applied to volar mid forearm for tissue elevation. Power web stretches 10x each direction      x     Bilateral bolster roll            Modalities Hot pack 5 min warm up;  Ultrasound:  3mHz  0.8w/cm2  50%  to volar mid forearm  for 8 minutes. Hot pack 5 min warm up;  Ultrasound:  3mHz  0.8w/cm2  50%  to volar mid forearm  for 8 minutes.  Hot pack 5 min warm up;  Ultrasound:  3mHz  0.8w/cm2  50%  to volar mid forearm for 8 minutes. Ultrasound:  3mHz  0.6w/cm2  100%  to volar mid forearm  for 8 minutes. Ultrasound:  3mHz  0.6w/cm2  100%  to volar mid forearm  for 8 minutes. MHP x 5 minutes, hand and forearm MHP x 5 minutes, hand/forearm    Ultrasound:  3mHz  0.8w/cm2  50%  to volar mid forearm  for 8 minutes. MHP x 5 minutes, hand/forearm    Ultrasound:  3mHz  0.8w/cm2  50%  to volar mid forearm  for 8 minutes. SCi Fit            Wrist roller velcro board  wrist           Median nerve glides 10x x x 10x x x      Ulnar nerve glides 10x  x  x       Radial nerve glides 10x  x  x       Clothespin pinches   With mirror, yellow, unilateral to bilateral         PNF diagonals 1#, 10x each diagonal, standing   Short arc, with mirror        Cupping   x x        thumb plunger for [employer] activation   1 minute         Forearm rotation  1 minute, forearm wand, 1 weight on end X in mirror, unilateral, RUE resting behind mirror x        Mirror therapy    with gripping of foam sponge, unilateral to bilateral; see also above X foam sponge, chinese balls, power web        IFC     Intensity 7, increased to 7.5 after 2 minutes; 7 minutes total       Flexbar pertrubations       2 min in neutral, 2 min in pronation         Patient was instructed in and issued a HEP.   HEP Date issued  Date amended Date discharged  Comments (HEP2Go code if used)   Median nerve glides 6/28/22      Computer and desk stretches 7/7/22                                           Charges:  1MT, 1TE, 1US  Total Timed Treatment: 45 minutes Total Treatment Time: 45 minutes  DARREL Simeon/JAIRO

## 2022-07-07 ENCOUNTER — TELEPHONE (OUTPATIENT)
Dept: PHYSICAL THERAPY | Facility: HOSPITAL | Age: 57
End: 2022-07-07

## 2022-07-07 ENCOUNTER — OFFICE VISIT (OUTPATIENT)
Dept: PHYSICAL THERAPY | Age: 57
End: 2022-07-07
Payer: OTHER MISCELLANEOUS

## 2022-07-07 DIAGNOSIS — R20.0 NUMBNESS AND TINGLING IN RIGHT HAND: ICD-10-CM

## 2022-07-07 DIAGNOSIS — S57.81XD CRUSHING INJURY OF RIGHT FOREARM, SUBSEQUENT ENCOUNTER: ICD-10-CM

## 2022-07-07 DIAGNOSIS — S40.021D CONTUSION OF RIGHT UPPER EXTREMITY, SUBSEQUENT ENCOUNTER: ICD-10-CM

## 2022-07-07 DIAGNOSIS — T14.8XXA PERIPHERAL NERVE CONTUSION: ICD-10-CM

## 2022-07-07 DIAGNOSIS — I10 ESSENTIAL HYPERTENSION: ICD-10-CM

## 2022-07-07 DIAGNOSIS — R20.2 NUMBNESS AND TINGLING IN RIGHT HAND: ICD-10-CM

## 2022-07-07 PROCEDURE — 97140 MANUAL THERAPY 1/> REGIONS: CPT

## 2022-07-07 PROCEDURE — 97035 APP MDLTY 1+ULTRASOUND EA 15: CPT

## 2022-07-07 PROCEDURE — 97110 THERAPEUTIC EXERCISES: CPT

## 2022-07-07 RX ORDER — SPIRONOLACTONE 50 MG/1
TABLET, FILM COATED ORAL
Qty: 90 TABLET | Refills: 1 | Status: SHIPPED | OUTPATIENT
Start: 2022-07-07

## 2022-07-07 NOTE — TELEPHONE ENCOUNTER
Appt scheduled. Since he is a Dermatologist, the photodermatitis could possibly have been addressed also. Please let pt know I do not prescribe Norrine Fender for alopecia currently, so she may of course f/u with Dr. Martha De La Garza regarding this.

## 2022-07-08 ENCOUNTER — TELEPHONE (OUTPATIENT)
Dept: DERMATOLOGY | Age: 57
End: 2022-07-08

## 2022-07-08 RX ORDER — GABAPENTIN 300 MG/1
CAPSULE ORAL
Qty: 60 CAPSULE | Refills: 0 | OUTPATIENT
Start: 2022-07-08

## 2022-07-11 ENCOUNTER — OFFICE VISIT (OUTPATIENT)
Dept: DERMATOLOGY CLINIC | Facility: CLINIC | Age: 57
End: 2022-07-11
Payer: COMMERCIAL

## 2022-07-11 DIAGNOSIS — L20.89 OTHER ATOPIC DERMATITIS: Primary | ICD-10-CM

## 2022-07-11 PROCEDURE — 99214 OFFICE O/P EST MOD 30 MIN: CPT | Performed by: DERMATOLOGY

## 2022-07-11 RX ORDER — BETAMETHASONE DIPROPIONATE 0.5 MG/G
CREAM TOPICAL
Qty: 50 G | Refills: 1 | Status: SHIPPED | OUTPATIENT
Start: 2022-07-11

## 2022-07-11 RX ORDER — LEVOCETIRIZINE DIHYDROCHLORIDE 5 MG/1
5 TABLET, FILM COATED ORAL EVERY EVENING
Qty: 30 TABLET | Refills: 3 | Status: SHIPPED | OUTPATIENT
Start: 2022-07-11

## 2022-07-11 RX ORDER — TRIAMCINOLONE ACETONIDE 1 MG/G
1 CREAM TOPICAL 3 TIMES DAILY
Qty: 454 G | Refills: 2 | Status: SHIPPED | OUTPATIENT
Start: 2022-07-11

## 2022-07-11 RX ORDER — RUXOLITINIB 15 MG/G
1 CREAM TOPICAL 2 TIMES DAILY
Qty: 60 G | Refills: 3 | Status: SHIPPED | OUTPATIENT
Start: 2022-07-11

## 2022-07-11 NOTE — PROGRESS NOTES
Dx:  Associated DX:  Crushing injury of right forearm, subsequent encounter (S57.81XD)  Peripheral nerve contusion (T14.8XXA)  Contusion of right upper extremity, subsequent encounter (S40.021D)  Numbness and tingling in right hand (R20.0,R20.2)  [place] [facility] (Authorized # of Visits): Authorizing Physician: Dr. Caio Newman MD visit: 8/1/2022  Fall Risk: standard         Precautions:  none         Date of initial eval: 3/23/22  Order Date:  5/31/2022  Authorizing Provider: Nayana Diaz  Procedure:  OCCUPATIONAL THERAPY - INTERNAL [[id number]]         Order #:   [id number]  Qty:  1    Order summary:  IHP - RFL, Routine, 3 visits       Comments:  Continue range of motion and strengthening. No splinting. Subjective:  Pt states that her arm feels ok it's just that one part that is sensitve and she doesn;t like it to be touched. Pain:    2/10    Objective:     See tx log below    Assessment   Pt continues to have increased sensitivity in forearm. Issue desensitization HEP. Pt rearranged work station to follow guidelines based on recommendations. Decreased pain in arm with improved body mechanics reported. Goals:  (to be met in 8 visits)   Patient will be independent and compliant with comprehensive HEP to maintain progress achieved in OT. (progressing)  Patient will present with increase in right hand digits to full composite flexion to allow for ease with gripping toothbrush. (met for ROM, not met for task)  Patient will present with  strength in the right hand to that of 80% of the contralateral hand in order to be able to perform gripping and carrying of grocery bags. (Progressing)  Pt will be able to engage in proper body mechanics while sitting at work station to allow for efficeincey and accuracy of work demands by 80%. Plan:   Frequency / Duration: Patient will be seen for 1-2 x/week or a total of 8 additional visits over a 90 day period.   Treatment will include: Manual Therapy, Neuromuscular Re-education, Therapeutic Activities, Therapeutic Exercise, Home Exercise Program instruction, Modalities prn. Next session:  Work station set up/ergonomics, [address], forearm workout     Treatment Log:  Date 5/10/22 5/24/22 6/28/22 6/30/22 7/05/22 7/07/22 7/12/22   Visit # 10/16 11/16 1/8 2/8 3/8 4/8 5/8   Evaluation        X        STM X MFR  X MFR x   x    MEM    x   x    IASTM x   x   x   Wrist maze    x X 3 minutes     Bilateral tennis ball on frisbee Unilateral in mirror   x x     Window washes  Reviewed for HEP        Hand bosu board          Rice marble sifting           pinch and search of beads in putty          Ball walk on wall          Table top hand/arm extended stretches          Ball roll on table    x      Bulk putty , twist x         Bilateral bulk putty roll x         Sponges with lumbrical bar     X 3     powerweb pertrubations      X 3 minutes with elbow extended X 3 minutes with elbow extended   Computer and desk stretches      x3    AROM with fluidotherapy       10 minutes   sponges    Abd/add in supination and pronation x 3 minutes each   Abd/add in supination and pronation x 3 minutes each   Yarn twisting          Rock tape application  Kinesiotape: star method applied to volar mid forearm for tissue elevation. Power web stretches    x      Bilateral bolster roll          Modalities Ultrasound:  3mHz  0.6w/cm2  100%  to volar mid forearm  for 8 minutes. Ultrasound:  3mHz  0.6w/cm2  100%  to volar mid forearm  for 8 minutes. MHP x 5 minutes, hand and forearm MHP x 5 minutes, hand/forearm    Ultrasound:  3mHz  0.8w/cm2  50%  to volar mid forearm  for 8 minutes. MHP x 5 minutes, hand/forearm    Ultrasound:  3mHz  0.8w/cm2  50%  to volar mid forearm  for 8 minutes. MHP x 5 minutes, hand/forearm    Ultrasound:  3mHz  0.8w/cm2, 50%  to volar mid forearm  for 8 minutes.    SCi Fit          Wrist roller velcro board          Median nerve glides 10x x x Ulnar nerve glides  x        Radial nerve glides  x        Clothespin pinches          PNF diagonals Short arc, with mirror         Cupping x         thumb plunger for [employer] activation          Forearm rotation x         Mirror therapy X foam sponge, chinese balls, power web         IFC  Intensity 7, increased to 7.5 after 2 minutes; 7 minutes total        Flexbar pertrubations    2 min in neutral, 2 min in pronation          Patient was instructed in and issued a HEP.   HEP Date issued  Date amended Date discharged  Comments (HEP2Go code if used)   Median nerve glides 6/28/22      Computer and desk stretches 7/7/22                                           Charges:  1MT, 1TE, 1US  Total Timed Treatment:  45 minutes Total Treatment Time: 45 minutes  Mimi Lloyd, OTR/L

## 2022-07-12 ENCOUNTER — OFFICE VISIT (OUTPATIENT)
Dept: PHYSICAL THERAPY | Age: 57
End: 2022-07-12
Payer: OTHER MISCELLANEOUS

## 2022-07-12 PROCEDURE — 97140 MANUAL THERAPY 1/> REGIONS: CPT

## 2022-07-12 PROCEDURE — 97110 THERAPEUTIC EXERCISES: CPT

## 2022-07-12 PROCEDURE — 97035 APP MDLTY 1+ULTRASOUND EA 15: CPT

## 2022-07-14 ENCOUNTER — TELEPHONE (OUTPATIENT)
Dept: DERMATOLOGY CLINIC | Facility: CLINIC | Age: 57
End: 2022-07-14

## 2022-07-14 ENCOUNTER — APPOINTMENT (OUTPATIENT)
Dept: PHYSICAL THERAPY | Age: 57
End: 2022-07-14
Payer: OTHER MISCELLANEOUS

## 2022-07-15 NOTE — TELEPHONE ENCOUNTER
Medication PA Requested:   Opzelura 1.5% External Cream                                                       CoverMyMeds Used:  Key:  Quantity:  60g  Day Supply:  Sig:   DX Code:      L20.9  Chronic Atopic Dermatitis                               CPT code (if applicable):   Case Number/Pending Ref#:     Thank you!

## 2022-07-18 ENCOUNTER — MED REC SCAN ONLY (OUTPATIENT)
Dept: DERMATOLOGY CLINIC | Facility: CLINIC | Age: 57
End: 2022-07-18

## 2022-07-18 ENCOUNTER — PATIENT MESSAGE (OUTPATIENT)
Dept: INTERNAL MEDICINE CLINIC | Facility: CLINIC | Age: 57
End: 2022-07-18

## 2022-07-18 ENCOUNTER — PATIENT MESSAGE (OUTPATIENT)
Dept: PULMONOLOGY | Facility: CLINIC | Age: 57
End: 2022-07-18

## 2022-07-18 LAB — AMB EXT COVID-19 RESULT: DETECTED

## 2022-07-18 NOTE — PROGRESS NOTES
Dx:  Associated DX:  Crushing injury of right forearm, subsequent encounter (S57.81XD)  Peripheral nerve contusion (T14.8XXA)  Contusion of right upper extremity, subsequent encounter (S40.021D)  Numbness and tingling in right hand (R20.0,R20.2)  [place] [facility] [place] (Authorized # of Visits): Authorizing Physician: Dr. Debra Newman MD visit: 8/1/2022  Fall Risk: standard         Precautions:  none         Date of initial eval: 3/23/22  Order Date:  5/31/2022  Authorizing Provider: Ann-Marie Tom  Procedure:  OCCUPATIONAL THERAPY - INTERNAL [[id number]]         Order #:   [id number]  Qty:  1    Order summary:  IHP - RFL, Routine, 3 visits       Comments:  Continue range of motion and strengthening. No splinting. Subjective:  ***Pt states that her arm feels ok it's just that one part that is sensitve and she doesn;t like it to be touched. Pain:   *** 2/10    Objective:     See tx log below    Assessment  *** Pt continues to have increased sensitivity in forearm. Issue desensitization HEP. Pt rearranged work station to follow guidelines based on recommendations. Decreased pain in arm with improved body mechanics reported. Goals:  (to be met in 8 visits)   Patient will be independent and compliant with comprehensive HEP to maintain progress achieved in OT. (progressing)  Patient will present with increase in right hand digits to full composite flexion to allow for ease with gripping toothbrush. (met for ROM, not met for task)  Patient will present with  strength in the right hand to that of 80% of the contralateral hand in order to be able to perform gripping and carrying of grocery bags. (Progressing)  Pt will be able to engage in proper body mechanics while sitting at work station to allow for efficeincey and accuracy of work demands by 80%. Plan:   Frequency / Duration: Patient will be seen for 1-2 x/week or a total of 8 additional visits over a 90 day period.   Treatment will include: Manual Therapy, Neuromuscular Re-education, Therapeutic Activities, Therapeutic Exercise, Home Exercise Program instruction, Modalities prn. Next session:  ***Work station set up/ergonomics, isometrics, forearm workout     Treatment Log:  Date 5/10/22 5/24/22 6/28/22 6/30/22 7/05/22 7/07/22 7/12/22 ***    Visit # 10/16 11/16 1/8 2/8 3/8 4/8 5/8 ***    Evaluation        X          STM X MFR  X MFR x   x      MEM    x   x      IASTM x   x   x     Wrist maze    x X 3 minutes       Bilateral tennis ball on frisbee Unilateral in mirror   x x       Window washes  Reviewed for HEP          Hand bosu board            Rice marble sifting             pinch and search of beads in putty            Ball walk on wall            Table top hand/arm extended stretches            Ball roll on table    x        Bulk putty , twist x           Bilateral bulk putty roll x           Sponges with lumbrical bar     X 3       powerweb pertrubations      X 3 minutes with elbow extended X 3 minutes with elbow extended     Computer and desk stretches      x3      AROM with fluidotherapy       10 minutes     sponges    Abd/add in supination and pronation x 3 minutes each   Abd/add in supination and pronation x 3 minutes each     Yarn twisting            Rock tape application  Kinesiotape: star method applied to volar mid forearm for tissue elevation. Power web stretches    x        Bilateral bolster roll            Modalities Ultrasound:  3mHz  0.6w/cm2  100%  to volar mid forearm  for 8 minutes. Ultrasound:  3mHz  0.6w/cm2  100%  to volar mid forearm  for 8 minutes. MHP x 5 minutes, hand and forearm MHP x 5 minutes, hand/forearm    Ultrasound:  3mHz  0.8w/cm2  50%  to volar mid forearm  for 8 minutes. MHP x 5 minutes, hand/forearm    Ultrasound:  3mHz  0.8w/cm2  50%  to volar mid forearm  for 8 minutes. MHP x 5 minutes, hand/forearm    Ultrasound:  3mHz  0.8w/cm2, 50%  to volar mid forearm  for 8 minutes.      [facility] Wrist roller velcro board            Median nerve glides 10x x x         Ulnar nerve glides  x          Radial nerve glides  x          Clothespin pinches            PNF diagonals Short arc, with mirror           Cupping x           thumb plunger for FPL activation            Forearm rotation x           Mirror therapy X foam sponge, chinese balls, power web           IFC  Intensity 7, increased to 7.5 after 2 minutes; 7 minutes total          Flexbar pertrubations    2 min in neutral, 2 min in pronation            Patient was instructed in and issued a HEP.   HEP Date issued  Date amended Date discharged  Comments (HEP2Go code if used)   Median nerve glides 6/28/22      Computer and desk stretches 7/7/22                                           Charges: *** 1MT, 1TE, 1US  Total Timed Treatment: *** 45 minutes Total Treatment Time: *** 45 minutes  Isabela Gomez OTR/L

## 2022-07-19 ENCOUNTER — TELEMEDICINE (OUTPATIENT)
Dept: INTERNAL MEDICINE CLINIC | Facility: CLINIC | Age: 57
End: 2022-07-19

## 2022-07-19 ENCOUNTER — APPOINTMENT (OUTPATIENT)
Dept: PHYSICAL THERAPY | Age: 57
End: 2022-07-19
Payer: OTHER MISCELLANEOUS

## 2022-07-19 ENCOUNTER — TELEPHONE (OUTPATIENT)
Dept: PHYSICAL THERAPY | Facility: HOSPITAL | Age: 57
End: 2022-07-19

## 2022-07-19 ENCOUNTER — TELEPHONE (OUTPATIENT)
Dept: PULMONOLOGY | Facility: CLINIC | Age: 57
End: 2022-07-19

## 2022-07-19 ENCOUNTER — PATIENT MESSAGE (OUTPATIENT)
Dept: INTERNAL MEDICINE CLINIC | Facility: CLINIC | Age: 57
End: 2022-07-19

## 2022-07-19 DIAGNOSIS — J45.21 MILD INTERMITTENT ASTHMA WITH ACUTE EXACERBATION: ICD-10-CM

## 2022-07-19 DIAGNOSIS — U07.1 COVID: Primary | ICD-10-CM

## 2022-07-19 PROCEDURE — 99214 OFFICE O/P EST MOD 30 MIN: CPT | Performed by: INTERNAL MEDICINE

## 2022-07-19 RX ORDER — PREDNISONE 10 MG/1
TABLET ORAL
Qty: 18 TABLET | Refills: 0 | Status: SHIPPED | OUTPATIENT
Start: 2022-07-19

## 2022-07-19 NOTE — TELEPHONE ENCOUNTER
----- Message from Bib Gomez sent at 7/18/2022 11:39 PM CDT -----  Regarding: Urgent  tested positive for COVID  Eloisa Badillo I tested positive  today for COVID I took the test at work. I was advised to reach out to my providers especially since I have a bad cough. I asked the medical  doctor who called about the RX for COVID he's said that  my cough sound bad and I may need to be on steroids   I also sent this message to my PCP I k now this cough is bad and before  you but me on symbacort  which I do not have any more because  you said I can stop taking it.  Please  advise I don't want to go into another  Asthma exacerbation

## 2022-07-19 NOTE — TELEPHONE ENCOUNTER
See also other MyChart 7/19/22. CSS=please assist to change  her upcoming appointment to a video. Future Appointments   Date Time Provider Eris Hoa   7/19/2022  3:00 PM Blaine Pathak MD [facility]         Ambrosio Estrella RN 7/19/2022  8:09 AM CDT        ----- Message -----  From: Roma Marte  Sent: 7/18/2022  11:33 PM CDT  To: Em Rn Triage  Subject: Test positive today for Talmadge Hausen Dr. Starr Lennox  I took a COVID test at work  today it came back positive. I was instructed to contact  my PCP especially  since I have Asthma  and one of my symptoms is a bad cough. I was wondering  if I can get RX for COVID that's out. I will also send same message  to pulmonologist to see what he say.  I also have follow  up appointment  with you tomorrow that I will need to cancel and have rescheduled

## 2022-07-19 NOTE — TELEPHONE ENCOUNTER
Spoke with patient states she has cough with wheezing and fatigue. She has appointment with PCP today at 3pm regarding her symptoms. Informed patient to take albuterol inhaler for wheeze, patient verbalized understanding, states she has to join a work call at 9:30am and will call pulmo office back.

## 2022-07-19 NOTE — TELEPHONE ENCOUNTER
Medication PA Requested:   Opzelura 1.5% External Cream                                                       CoverMyMeds Used:  Key:KEY: [id number]  [facility]  Quantity:  60g  Day Supply:  Sig:   DX Code:      L20.9  Chronic Atopic Dermatitis        Faxed [employer] FEP PA form with LOV  7/11/2022. Awaiting determination.

## 2022-07-19 NOTE — TELEPHONE ENCOUNTER
Spoke with patient states she has cough with slight wheeze, hot flashes, nasal congestion and fatigue. Denies fever, chest pain, chest congestion, chest tightness, shortness of breath. States the doctor at work suggested she get prednisone. Using albuterol with relief, stopped Symbicort. Has virtual appt with PCP at 3pm today.

## 2022-07-20 NOTE — TELEPHONE ENCOUNTER
Dr Edilberto Blanchard denied, letter in your office.  It looks like they need a scoring tool evaluation

## 2022-07-20 NOTE — LETTER
11/22/2022          To Whom It May Concern:    Randi Heredia is currently under my medical care and may return to work with the follow restrictions: No lifting, pushing or pulling more than 35lbs, nothing higher than chest level for the next 6 weeks    If you require additional information please contact our office.         Sincerely,    Lex Pearce MD
general/monitored anesthesia care (MAC)

## 2022-07-21 ENCOUNTER — APPOINTMENT (OUTPATIENT)
Dept: PHYSICAL THERAPY | Age: 57
End: 2022-07-21
Payer: OTHER MISCELLANEOUS

## 2022-07-22 NOTE — TELEPHONE ENCOUNTER
[address] Chandler reviewed and states the letter did not provide the information needed to appeal the denial.  Rx sent to [id number] [facility].

## 2022-07-22 NOTE — TELEPHONE ENCOUNTER
Dr. Chidi Serna states they are unable to apply the rebate coupon to this medication since it was denied by pt's insurance. Copay would be arounf $2000. Please advise.

## 2022-07-22 NOTE — TELEPHONE ENCOUNTER
Letter printed from website and attached to denial letter for [facility] to review. Thank you.

## 2022-07-22 NOTE — TELEPHONE ENCOUNTER
Fax from [facility]    pa denied for [facility].  Placed fax in pa inbox

## 2022-07-26 ENCOUNTER — TELEPHONE (OUTPATIENT)
Dept: INTERNAL MEDICINE CLINIC | Facility: CLINIC | Age: 57
End: 2022-07-26

## 2022-07-26 ENCOUNTER — APPOINTMENT (OUTPATIENT)
Dept: PHYSICAL THERAPY | Age: 57
End: 2022-07-26
Payer: OTHER MISCELLANEOUS

## 2022-07-26 ENCOUNTER — HOSPITAL ENCOUNTER (OUTPATIENT)
Age: 57
Discharge: HOME | End: 2022-07-26
Payer: COMMERCIAL

## 2022-07-26 ENCOUNTER — APPOINTMENT (OUTPATIENT)
Dept: GENERAL RADIOLOGY | Age: 57
End: 2022-07-26
Attending: PHYSICIAN ASSISTANT
Payer: COMMERCIAL

## 2022-07-26 VITALS
DIASTOLIC BLOOD PRESSURE: 79 MMHG | RESPIRATION RATE: 21 BRPM | HEART RATE: 80 BPM | TEMPERATURE: 99 F | SYSTOLIC BLOOD PRESSURE: 132 MMHG | OXYGEN SATURATION: 96 %

## 2022-07-26 DIAGNOSIS — R79.9 ELEVATED BUN: ICD-10-CM

## 2022-07-26 DIAGNOSIS — U07.1 COVID-19 VIRUS INFECTION: Primary | ICD-10-CM

## 2022-07-26 DIAGNOSIS — R07.9 CHEST PAIN, UNSPECIFIED TYPE: ICD-10-CM

## 2022-07-26 LAB
BUN BLD-MCNC: 22 MG/DL (ref 7–18)
CHLORIDE BLD-SCNC: 106 MMOL/L (ref 98–112)
CO2 BLD-SCNC: 27 MMOL/L (ref 21–32)
CREAT BLD-MCNC: 1.1 MG/DL
DDIMER WHOLE BLOOD: <200 NG/ML DDU (ref ?–400)
GLUCOSE BLD-MCNC: 99 MG/DL (ref 70–99)
HCT VFR BLD CALC: 42 %
ISTAT IONIZED CALCIUM FOR CHEM 8: 1.21 MMOL/L (ref 1.12–1.32)
POTASSIUM BLD-SCNC: 4.3 MMOL/L (ref 3.6–5.1)
SODIUM BLD-SCNC: 141 MMOL/L (ref 136–145)
TROPONIN I BLD-MCNC: <0.02 NG/ML

## 2022-07-26 PROCEDURE — 80047 BASIC METABLC PNL IONIZED CA: CPT

## 2022-07-26 PROCEDURE — 93005 ELECTROCARDIOGRAM TRACING: CPT

## 2022-07-26 PROCEDURE — 85378 FIBRIN DEGRADE SEMIQUANT: CPT | Performed by: PHYSICIAN ASSISTANT

## 2022-07-26 PROCEDURE — 84484 ASSAY OF TROPONIN QUANT: CPT

## 2022-07-26 PROCEDURE — 71046 X-RAY EXAM CHEST 2 VIEWS: CPT | Performed by: PHYSICIAN ASSISTANT

## 2022-07-26 PROCEDURE — 99215 OFFICE O/P EST HI 40 MIN: CPT

## 2022-07-26 PROCEDURE — 85025 COMPLETE CBC W/AUTO DIFF WBC: CPT | Performed by: PHYSICIAN ASSISTANT

## 2022-07-26 PROCEDURE — 93010 ELECTROCARDIOGRAM REPORT: CPT | Performed by: PHYSICIAN ASSISTANT

## 2022-07-26 PROCEDURE — 36415 COLL VENOUS BLD VENIPUNCTURE: CPT

## 2022-07-26 PROCEDURE — 99213 OFFICE O/P EST LOW 20 MIN: CPT

## 2022-07-26 RX ORDER — BENZONATATE 100 MG/1
100 CAPSULE ORAL 3 TIMES DAILY PRN
Qty: 30 CAPSULE | Refills: 0 | Status: SHIPPED | OUTPATIENT
Start: 2022-07-26 | End: 2022-08-01

## 2022-07-26 RX ORDER — CODEINE PHOSPHATE AND GUAIFENESIN 10; 100 MG/5ML; MG/5ML
5 SOLUTION ORAL EVERY 6 HOURS PRN
Qty: 50 ML | Refills: 0 | Status: SHIPPED | OUTPATIENT
Start: 2022-07-26 | End: 2022-08-01

## 2022-07-26 NOTE — TELEPHONE ENCOUNTER
Good morning Dr. Pooja Boyle  I am still not feeling  well. I can't shake the headache, coughing  and now I'm having  pain in my back and left calf. Still extremely  fatigue. Pt was COVID + as of 7/18. Since then, she has had a course of steroids, Paxlovid and prescription cough medicine. Today, she states that she is not better. She doesn't have a fever but reports episodes of drenching sweat (last one was this am). Also reports mid level back pain beginning yesterday and left calf pain beginning today (without swelling). I advised that the pt go to the IC as she needs in person evaluation and possibly imaging. Pt agreed with this plan.

## 2022-07-26 NOTE — ED INITIAL ASSESSMENT (HPI)
Presents for cough, back pain and headaches.    +COVID 7/18  S/p paxlovid treatment, finished 7/25  Denies fever

## 2022-07-27 NOTE — PATIENT INSTRUCTIONS
Do fasting labs soon. Fast for 12 hours. Water is okay. You can walk-in or schedule an appointment.   Do not take vitamins or supplements for 3 days prior to the blood test.
No

## 2022-07-28 ENCOUNTER — APPOINTMENT (OUTPATIENT)
Dept: PHYSICAL THERAPY | Age: 57
End: 2022-07-28
Payer: OTHER MISCELLANEOUS

## 2022-07-28 NOTE — TELEPHONE ENCOUNTER
Please let pt know drug is not covered.   Continue with her current regimen follow-up 1 to 2 months if not improving

## 2022-08-01 ENCOUNTER — OFFICE VISIT (OUTPATIENT)
Dept: ORTHOPEDICS CLINIC | Facility: CLINIC | Age: 57
End: 2022-08-01

## 2022-08-01 DIAGNOSIS — T14.8XXA PERIPHERAL NERVE CONTUSION: ICD-10-CM

## 2022-08-01 DIAGNOSIS — R20.2 NUMBNESS AND TINGLING IN RIGHT HAND: ICD-10-CM

## 2022-08-01 DIAGNOSIS — S57.81XD CRUSHING INJURY OF RIGHT FOREARM, SUBSEQUENT ENCOUNTER: Primary | ICD-10-CM

## 2022-08-01 DIAGNOSIS — S40.021D CONTUSION OF RIGHT UPPER EXTREMITY, SUBSEQUENT ENCOUNTER: ICD-10-CM

## 2022-08-01 DIAGNOSIS — R20.0 NUMBNESS AND TINGLING IN RIGHT HAND: ICD-10-CM

## 2022-08-01 RX ORDER — TACROLIMUS 1 MG/G
1 OINTMENT TOPICAL 2 TIMES DAILY
COMMUNITY
Start: 2022-06-07

## 2022-08-03 ENCOUNTER — OFFICE VISIT (OUTPATIENT)
Dept: DERMATOLOGY CLINIC | Facility: CLINIC | Age: 57
End: 2022-08-03
Payer: COMMERCIAL

## 2022-08-03 DIAGNOSIS — L20.89 OTHER ATOPIC DERMATITIS: Primary | ICD-10-CM

## 2022-08-03 DIAGNOSIS — L25.9 CONTACT DERMATITIS AND ECZEMA: ICD-10-CM

## 2022-08-03 DIAGNOSIS — L56.8 PHOTOSENSITIVITY DERMATITIS DUE TO SUN: ICD-10-CM

## 2022-08-03 DIAGNOSIS — Z51.81 MEDICATION MONITORING ENCOUNTER: ICD-10-CM

## 2022-08-03 PROCEDURE — 99213 OFFICE O/P EST LOW 20 MIN: CPT | Performed by: DERMATOLOGY

## 2022-09-01 ENCOUNTER — TELEPHONE (OUTPATIENT)
Dept: PHYSICAL THERAPY | Facility: HOSPITAL | Age: 57
End: 2022-09-01

## 2022-09-01 ENCOUNTER — APPOINTMENT (OUTPATIENT)
Dept: PHYSICAL THERAPY | Age: 57
End: 2022-09-01
Attending: ORTHOPAEDIC SURGERY
Payer: OTHER MISCELLANEOUS

## 2022-09-06 ENCOUNTER — OFFICE VISIT (OUTPATIENT)
Dept: PHYSICAL THERAPY | Age: 57
End: 2022-09-06
Attending: ORTHOPAEDIC SURGERY
Payer: OTHER MISCELLANEOUS

## 2022-09-06 PROCEDURE — 97140 MANUAL THERAPY 1/> REGIONS: CPT

## 2022-09-06 PROCEDURE — 97110 THERAPEUTIC EXERCISES: CPT

## 2022-09-08 ENCOUNTER — OFFICE VISIT (OUTPATIENT)
Dept: PHYSICAL THERAPY | Age: 57
End: 2022-09-08
Attending: ORTHOPAEDIC SURGERY
Payer: OTHER MISCELLANEOUS

## 2022-09-08 PROCEDURE — 97140 MANUAL THERAPY 1/> REGIONS: CPT

## 2022-09-08 PROCEDURE — 97110 THERAPEUTIC EXERCISES: CPT

## 2022-09-10 DIAGNOSIS — E66.9 OBESITY (BMI 30-39.9): ICD-10-CM

## 2022-09-12 RX ORDER — TOPIRAMATE 25 MG/1
TABLET ORAL
Qty: 90 TABLET | Refills: 1 | OUTPATIENT
Start: 2022-09-12

## 2022-09-12 RX ORDER — SPIRONOLACTONE 50 MG/1
TABLET, FILM COATED ORAL
Qty: 180 TABLET | Refills: 0 | Status: SHIPPED | OUTPATIENT
Start: 2022-09-12 | End: 2023-01-16

## 2022-09-13 ENCOUNTER — OFFICE VISIT (OUTPATIENT)
Dept: ORTHOPEDICS CLINIC | Facility: CLINIC | Age: 57
End: 2022-09-13
Payer: OTHER MISCELLANEOUS

## 2022-09-13 ENCOUNTER — APPOINTMENT (OUTPATIENT)
Dept: PHYSICAL THERAPY | Age: 57
End: 2022-09-13
Attending: ORTHOPAEDIC SURGERY
Payer: OTHER MISCELLANEOUS

## 2022-09-13 DIAGNOSIS — R20.0 NUMBNESS AND TINGLING IN RIGHT HAND: ICD-10-CM

## 2022-09-13 DIAGNOSIS — S40.021D CONTUSION OF RIGHT UPPER EXTREMITY, SUBSEQUENT ENCOUNTER: ICD-10-CM

## 2022-09-13 DIAGNOSIS — S57.81XD CRUSHING INJURY OF RIGHT FOREARM, SUBSEQUENT ENCOUNTER: Primary | ICD-10-CM

## 2022-09-13 DIAGNOSIS — R20.2 NUMBNESS AND TINGLING IN RIGHT HAND: ICD-10-CM

## 2022-09-13 DIAGNOSIS — T14.8XXA PERIPHERAL NERVE CONTUSION: ICD-10-CM

## 2022-09-13 PROCEDURE — 99214 OFFICE O/P EST MOD 30 MIN: CPT | Performed by: ORTHOPAEDIC SURGERY

## 2022-09-13 RX ORDER — GABAPENTIN 300 MG/1
300 CAPSULE ORAL 2 TIMES DAILY
Qty: 60 CAPSULE | Refills: 0 | Status: SHIPPED | OUTPATIENT
Start: 2022-09-13

## 2022-09-14 RX ORDER — LINACLOTIDE 145 UG/1
1 CAPSULE, GELATIN COATED ORAL DAILY
Qty: 90 CAPSULE | Refills: 0 | Status: SHIPPED | OUTPATIENT
Start: 2022-09-14 | End: 2023-03-09

## 2022-09-15 ENCOUNTER — OFFICE VISIT (OUTPATIENT)
Dept: PHYSICAL THERAPY | Age: 57
End: 2022-09-15
Attending: ORTHOPAEDIC SURGERY
Payer: OTHER MISCELLANEOUS

## 2022-09-15 PROCEDURE — 97110 THERAPEUTIC EXERCISES: CPT

## 2022-09-15 PROCEDURE — 97140 MANUAL THERAPY 1/> REGIONS: CPT

## 2022-10-06 ENCOUNTER — APPOINTMENT (OUTPATIENT)
Dept: PHYSICAL THERAPY | Age: 57
End: 2022-10-06
Attending: INTERNAL MEDICINE
Payer: OTHER MISCELLANEOUS

## 2022-10-11 ENCOUNTER — OFFICE VISIT (OUTPATIENT)
Dept: PHYSICAL THERAPY | Age: 57
End: 2022-10-11
Attending: INTERNAL MEDICINE
Payer: OTHER MISCELLANEOUS

## 2022-10-11 PROCEDURE — 97140 MANUAL THERAPY 1/> REGIONS: CPT

## 2022-10-11 PROCEDURE — 97110 THERAPEUTIC EXERCISES: CPT

## 2022-10-18 ENCOUNTER — APPOINTMENT (OUTPATIENT)
Dept: PHYSICAL THERAPY | Age: 57
End: 2022-10-18
Attending: INTERNAL MEDICINE
Payer: OTHER MISCELLANEOUS

## 2022-10-20 ENCOUNTER — OFFICE VISIT (OUTPATIENT)
Dept: PHYSICAL THERAPY | Age: 57
End: 2022-10-20
Attending: INTERNAL MEDICINE
Payer: OTHER MISCELLANEOUS

## 2022-10-20 PROCEDURE — 97140 MANUAL THERAPY 1/> REGIONS: CPT

## 2022-10-20 PROCEDURE — 97110 THERAPEUTIC EXERCISES: CPT

## 2022-10-25 ENCOUNTER — APPOINTMENT (OUTPATIENT)
Dept: PHYSICAL THERAPY | Age: 57
End: 2022-10-25
Attending: INTERNAL MEDICINE
Payer: OTHER MISCELLANEOUS

## 2022-10-25 ENCOUNTER — TELEPHONE (OUTPATIENT)
Dept: PHYSICAL THERAPY | Facility: HOSPITAL | Age: 57
End: 2022-10-25

## 2022-10-27 ENCOUNTER — APPOINTMENT (OUTPATIENT)
Dept: PHYSICAL THERAPY | Age: 57
End: 2022-10-27
Attending: INTERNAL MEDICINE
Payer: OTHER MISCELLANEOUS

## 2022-11-01 ENCOUNTER — OFFICE VISIT (OUTPATIENT)
Dept: PHYSICAL THERAPY | Age: 57
End: 2022-11-01
Attending: INTERNAL MEDICINE
Payer: OTHER MISCELLANEOUS

## 2022-11-01 PROCEDURE — 97140 MANUAL THERAPY 1/> REGIONS: CPT

## 2022-11-01 PROCEDURE — 97110 THERAPEUTIC EXERCISES: CPT

## 2022-11-03 ENCOUNTER — TELEPHONE (OUTPATIENT)
Dept: PHYSICAL THERAPY | Facility: HOSPITAL | Age: 57
End: 2022-11-03

## 2022-11-03 ENCOUNTER — APPOINTMENT (OUTPATIENT)
Dept: PHYSICAL THERAPY | Age: 57
End: 2022-11-03
Attending: INTERNAL MEDICINE
Payer: OTHER MISCELLANEOUS

## 2022-11-08 ENCOUNTER — OFFICE VISIT (OUTPATIENT)
Dept: PHYSICAL THERAPY | Age: 57
End: 2022-11-08
Attending: INTERNAL MEDICINE
Payer: OTHER MISCELLANEOUS

## 2022-11-08 PROCEDURE — 97140 MANUAL THERAPY 1/> REGIONS: CPT

## 2022-11-08 PROCEDURE — 97110 THERAPEUTIC EXERCISES: CPT

## 2022-11-09 ENCOUNTER — TELEPHONE (OUTPATIENT)
Dept: PHYSICAL THERAPY | Facility: HOSPITAL | Age: 57
End: 2022-11-09

## 2022-11-09 ENCOUNTER — OFFICE VISIT (OUTPATIENT)
Dept: INTERNAL MEDICINE CLINIC | Facility: CLINIC | Age: 57
End: 2022-11-09
Payer: COMMERCIAL

## 2022-11-09 VITALS
DIASTOLIC BLOOD PRESSURE: 75 MMHG | BODY MASS INDEX: 33 KG/M2 | SYSTOLIC BLOOD PRESSURE: 131 MMHG | RESPIRATION RATE: 18 BRPM | OXYGEN SATURATION: 100 % | HEART RATE: 84 BPM | TEMPERATURE: 99 F | HEIGHT: 69 IN

## 2022-11-09 DIAGNOSIS — M79.10 MYALGIA: ICD-10-CM

## 2022-11-09 DIAGNOSIS — J02.9 SORE THROAT: Primary | ICD-10-CM

## 2022-11-09 DIAGNOSIS — R51.9 ACUTE NONINTRACTABLE HEADACHE, UNSPECIFIED HEADACHE TYPE: ICD-10-CM

## 2022-11-09 LAB
KIT EXPIRATION DATE: NORMAL DATE
KIT LOT #: 4010 NUMERIC
STREP GRP A CUL-SCR: POSITIVE

## 2022-11-09 PROCEDURE — 3078F DIAST BP <80 MM HG: CPT | Performed by: INTERNAL MEDICINE

## 2022-11-09 PROCEDURE — 87880 STREP A ASSAY W/OPTIC: CPT | Performed by: INTERNAL MEDICINE

## 2022-11-09 PROCEDURE — 3008F BODY MASS INDEX DOCD: CPT | Performed by: INTERNAL MEDICINE

## 2022-11-09 PROCEDURE — 99214 OFFICE O/P EST MOD 30 MIN: CPT | Performed by: INTERNAL MEDICINE

## 2022-11-09 PROCEDURE — 3075F SYST BP GE 130 - 139MM HG: CPT | Performed by: INTERNAL MEDICINE

## 2022-11-09 RX ORDER — AZITHROMYCIN 250 MG/1
TABLET, FILM COATED ORAL
Qty: 6 TABLET | Refills: 0 | Status: SHIPPED | OUTPATIENT
Start: 2022-11-09 | End: 2022-11-11

## 2022-11-10 ENCOUNTER — APPOINTMENT (OUTPATIENT)
Dept: PHYSICAL THERAPY | Age: 57
End: 2022-11-10
Attending: INTERNAL MEDICINE
Payer: OTHER MISCELLANEOUS

## 2022-11-11 ENCOUNTER — HOSPITAL ENCOUNTER (OUTPATIENT)
Age: 57
Discharge: HOME | End: 2022-11-11
Payer: COMMERCIAL

## 2022-11-11 VITALS — RESPIRATION RATE: 16 BRPM | OXYGEN SATURATION: 100 % | TEMPERATURE: 98 F | HEART RATE: 100 BPM

## 2022-11-11 DIAGNOSIS — H66.92 LEFT OTITIS MEDIA, UNSPECIFIED OTITIS MEDIA TYPE: Primary | ICD-10-CM

## 2022-11-11 RX ORDER — CLINDAMYCIN HYDROCHLORIDE 300 MG/1
300 CAPSULE ORAL 3 TIMES DAILY
Qty: 30 CAPSULE | Refills: 0 | Status: SHIPPED | OUTPATIENT
Start: 2022-11-11 | End: 2022-11-21

## 2022-11-11 RX ORDER — ACETAMINOPHEN 500 MG
1000 TABLET ORAL ONCE
Status: COMPLETED | OUTPATIENT
Start: 2022-11-11 | End: 2022-11-11

## 2022-11-11 NOTE — DISCHARGE INSTRUCTIONS
Tylenol as needed for pain or fever. Stop your Zithromax, and start the clindamycin as prescribed. Follow-up with your doctor. Return for any concerns.

## 2022-11-13 ENCOUNTER — TELEPHONE (OUTPATIENT)
Dept: INTERNAL MEDICINE CLINIC | Facility: CLINIC | Age: 57
End: 2022-11-13

## 2022-11-13 NOTE — TELEPHONE ENCOUNTER
On call      Patient states she has ear pressure that is really bothering  Her  , state  started clindamycin 2 days ago for strep throat -was diagnosed in immediate care on 11/11  Patient states her throat feels better, but ear is bothering her Has a lots of pressure and cannot hear well    Recommend patient to start with a nasal spray Flonase 2 puffs in each nostril daily for   Few days than as needed     Even she can try Claritin-D short course  Once -twice daily - fo 1-2  Days- monitor blood pressure-  To help with the pressure in the ear    Recommend patient to go to immediate care today if not better or see tomorrow her primary care doctor or see   Possibly see ENT if not improving .       Patient agrees with plan verbalized understanding and compliance

## 2022-11-14 ENCOUNTER — TELEPHONE (OUTPATIENT)
Dept: INTERNAL MEDICINE CLINIC | Facility: CLINIC | Age: 57
End: 2022-11-14

## 2022-11-14 ENCOUNTER — HOSPITAL ENCOUNTER (OUTPATIENT)
Dept: GENERAL RADIOLOGY | Facility: HOSPITAL | Age: 57
Discharge: HOME | End: 2022-11-14
Attending: INTERNAL MEDICINE
Payer: COMMERCIAL

## 2022-11-14 DIAGNOSIS — R05.1 ACUTE COUGH: Primary | ICD-10-CM

## 2022-11-14 DIAGNOSIS — R05.1 ACUTE COUGH: ICD-10-CM

## 2022-11-14 PROCEDURE — 71046 X-RAY EXAM CHEST 2 VIEWS: CPT | Performed by: INTERNAL MEDICINE

## 2022-11-14 NOTE — TELEPHONE ENCOUNTER
Pt states that she still has great pain in her ears, but also now has a cough (in the mornings). Does not want to come in and see another primary provider (as she has already seen dr. Mayda Schmidt and the provider in Immediate care. Is asking for an ENT referral and a CXR order. I gave her the phone number for ENT as she has O insurance and therefore does not need a referral.  Please advise on request for CXR.

## 2022-11-14 NOTE — TELEPHONE ENCOUNTER
----- Message from Bib Wu sent at 11/13/2022  5:35 PM CST -----  Regarding: Ent referral  [place] Dr. Ambrosio Espitia spent time visiting  2 providers last week. Was dx  with strep throat and the worst ear infection . I was put on antibiotics twice stop z pack and start clindamycin. The pain in my ears is unbearable and I can not hear. At first it was the left now both. I lso called on call MD today who suggested Flonase and Claritin D. I had Flonase and took zyrtec along with antibiotics and tylenol. I got no rest last night with horrible cough. I asked for chest xray .  Please help I can't hear and now coughing slight ting green only when I wake up

## 2022-11-14 NOTE — TELEPHONE ENCOUNTER
Left a detailed message to cell (ok per SCHUYLER) regarding note below. Advised to call back and speak with RN.

## 2022-11-14 NOTE — TELEPHONE ENCOUNTER
Joana  Active and  message sent with provider's recommendation.        Future Appointments   Date Time Provider Eris Camara   11/15/2022  5:30 PM Radha De, OT LMB ADLT RHB EM [place]   11/21/2022  2:20 PM Yosvany Beltrán, DO PM&R ELM [facility] 150   11/22/2022  3:40 PM Heidy George MD [facility] 150   11/29/2022  5:30 PM Radha De, OT LMB ADLT RHB [facility] [place]   12/1/2022  5:30 PM Radha De, OT LMB ADLT RHB [facility] [place]   12/13/2022  5:30 PM Radha De, OT LMB ADLT RHB [facility] [place]   12/15/2022  5:30 PM Radha De, OT LMB ADLT RHB [facility] [place]   12/20/2022  5:30 PM Radha De, OT LMB ADLT RHB [facility] [place]   12/27/2022  5:30 PM Radha De, OT LMB ADLT RHB [facility] [place]   12/29/2022  5:30 PM Radha De, OT LMB ADLT RHB [facility] [place]

## 2022-11-15 ENCOUNTER — APPOINTMENT (OUTPATIENT)
Dept: PHYSICAL THERAPY | Age: 57
End: 2022-11-15
Attending: INTERNAL MEDICINE
Payer: OTHER MISCELLANEOUS

## 2022-11-16 ENCOUNTER — OFFICE VISIT (OUTPATIENT)
Dept: OTOLARYNGOLOGY | Facility: CLINIC | Age: 57
End: 2022-11-16
Payer: COMMERCIAL

## 2022-11-16 DIAGNOSIS — H65.02 ACUTE SEROUS OTITIS MEDIA OF LEFT EAR, RECURRENCE NOT SPECIFIED: Primary | ICD-10-CM

## 2022-11-16 PROCEDURE — 99213 OFFICE O/P EST LOW 20 MIN: CPT | Performed by: SPECIALIST

## 2022-11-16 RX ORDER — LEVOFLOXACIN 500 MG/1
500 TABLET, FILM COATED ORAL EVERY 24 HOURS
Qty: 7 TABLET | Refills: 0 | Status: SHIPPED | OUTPATIENT
Start: 2022-11-16

## 2022-11-16 RX ORDER — OFLOXACIN 3 MG/ML
5 SOLUTION AURICULAR (OTIC) 2 TIMES DAILY
Qty: 5 ML | Refills: 0 | Status: SHIPPED | OUTPATIENT
Start: 2022-11-16 | End: 2022-11-17

## 2022-11-16 NOTE — PATIENT INSTRUCTIONS
acute left serous otitis media. Tympanic membrane's are also inflamed left greater than right. I placed you on Levaquin for 1 week and Floxin drops. Follow-up in 2 to 3 weeks time, sooner if problems. If the fluid is not clear, I will need to check your nasopharynx to be sure there are no masses behind it.

## 2022-11-17 RX ORDER — OFLOXACIN 3 MG/ML
SOLUTION AURICULAR (OTIC)
Qty: 5 ML | Refills: 0 | Status: SHIPPED | OUTPATIENT
Start: 2022-11-17

## 2022-11-21 ENCOUNTER — PROCEDURE VISIT (OUTPATIENT)
Dept: PHYSICAL MEDICINE AND REHAB | Facility: CLINIC | Age: 57
End: 2022-11-21

## 2022-11-21 DIAGNOSIS — R20.2 PARESTHESIA OF RIGHT ARM: Primary | ICD-10-CM

## 2022-11-22 ENCOUNTER — OFFICE VISIT (OUTPATIENT)
Dept: ORTHOPEDICS CLINIC | Facility: CLINIC | Age: 57
End: 2022-11-22
Payer: OTHER MISCELLANEOUS

## 2022-11-22 ENCOUNTER — APPOINTMENT (OUTPATIENT)
Dept: PHYSICAL THERAPY | Age: 57
End: 2022-11-22
Attending: INTERNAL MEDICINE
Payer: OTHER MISCELLANEOUS

## 2022-11-22 DIAGNOSIS — R20.2 NUMBNESS AND TINGLING IN RIGHT HAND: ICD-10-CM

## 2022-11-22 DIAGNOSIS — R20.0 NUMBNESS AND TINGLING IN RIGHT HAND: ICD-10-CM

## 2022-11-22 DIAGNOSIS — T14.8XXA PERIPHERAL NERVE CONTUSION: ICD-10-CM

## 2022-11-22 DIAGNOSIS — S57.81XD CRUSHING INJURY OF RIGHT FOREARM, SUBSEQUENT ENCOUNTER: Primary | ICD-10-CM

## 2022-11-22 DIAGNOSIS — S40.021D CONTUSION OF RIGHT UPPER EXTREMITY, SUBSEQUENT ENCOUNTER: ICD-10-CM

## 2022-11-22 PROCEDURE — 99214 OFFICE O/P EST MOD 30 MIN: CPT

## 2022-11-24 ENCOUNTER — PATIENT MESSAGE (OUTPATIENT)
Dept: OTOLARYNGOLOGY | Facility: CLINIC | Age: 57
End: 2022-11-24

## 2022-11-25 ENCOUNTER — OFFICE VISIT (OUTPATIENT)
Dept: OTOLARYNGOLOGY | Facility: CLINIC | Age: 57
End: 2022-11-25
Payer: COMMERCIAL

## 2022-11-25 DIAGNOSIS — H91.8X9 ASYMMETRICAL HEARING LOSS: Primary | ICD-10-CM

## 2022-11-25 DIAGNOSIS — H65.02 ACUTE SEROUS OTITIS MEDIA OF LEFT EAR, RECURRENCE NOT SPECIFIED: ICD-10-CM

## 2022-11-25 DIAGNOSIS — H90.0 CONDUCTIVE HEARING LOSS, BILATERAL: ICD-10-CM

## 2022-11-25 RX ORDER — PREDNISONE 20 MG/1
20 TABLET ORAL DAILY
Qty: 3 TABLET | Refills: 0 | Status: SHIPPED | OUTPATIENT
Start: 2022-11-25

## 2022-11-25 NOTE — TELEPHONE ENCOUNTER
From: Juancho Henriquez  To: Vera Carrillo MD  Sent: 11/24/2022 6:37 PM CST  Subject: Still can't hear    Rebecca Salguero,  I am still having trouble hearing which is very scary and unsafe. I am a nervous wreck when driving as I can barely hear anything outside my care and find this unsafe. I can not hear my staff at times when speaking to me and it is getting frustrating to them. My ears still seems clogged and I feel like I am partially deaf. I can not continue to function like this as this is not safe or good. I am not scheduled to return until December and can not continue to function like this. I have lost my hearing and need someone help. It has been 3 weeks and I can't continue to function like this.  Please help me as I am going deaf

## 2022-11-25 NOTE — PATIENT INSTRUCTIONS
You had crusting on your bilateral tympanic membranes which was fully cleaned. Your left tympanic membrane remains inflamed and retracted. An audiogram was ordered to better evaluate your hearing. You were also placed on 3 days of prednisone.

## 2022-11-29 ENCOUNTER — TELEPHONE (OUTPATIENT)
Dept: PHYSICAL THERAPY | Facility: HOSPITAL | Age: 57
End: 2022-11-29

## 2022-11-29 ENCOUNTER — APPOINTMENT (OUTPATIENT)
Dept: PHYSICAL THERAPY | Age: 57
End: 2022-11-29
Attending: INTERNAL MEDICINE
Payer: OTHER MISCELLANEOUS

## 2022-12-01 ENCOUNTER — PATIENT MESSAGE (OUTPATIENT)
Dept: INTERNAL MEDICINE CLINIC | Facility: CLINIC | Age: 57
End: 2022-12-01

## 2022-12-01 ENCOUNTER — APPOINTMENT (OUTPATIENT)
Dept: PHYSICAL THERAPY | Age: 57
End: 2022-12-01
Attending: INTERNAL MEDICINE
Payer: OTHER MISCELLANEOUS

## 2022-12-01 NOTE — TELEPHONE ENCOUNTER
Influenza vaccination form has been completed and signed by LV. Placed at the   [facility] for . [employer] message sent.

## 2022-12-05 ENCOUNTER — OFFICE VISIT (OUTPATIENT)
Dept: PULMONOLOGY | Facility: CLINIC | Age: 57
End: 2022-12-05
Payer: COMMERCIAL

## 2022-12-05 VITALS — SYSTOLIC BLOOD PRESSURE: 119 MMHG | OXYGEN SATURATION: 98 % | DIASTOLIC BLOOD PRESSURE: 82 MMHG | HEART RATE: 99 BPM

## 2022-12-05 DIAGNOSIS — J45.30 MILD PERSISTENT ASTHMA WITHOUT COMPLICATION: Primary | ICD-10-CM

## 2022-12-05 PROCEDURE — 3079F DIAST BP 80-89 MM HG: CPT | Performed by: INTERNAL MEDICINE

## 2022-12-05 PROCEDURE — 3074F SYST BP LT 130 MM HG: CPT | Performed by: INTERNAL MEDICINE

## 2022-12-05 PROCEDURE — 99214 OFFICE O/P EST MOD 30 MIN: CPT | Performed by: INTERNAL MEDICINE

## 2022-12-05 RX ORDER — BUDESONIDE AND FORMOTEROL FUMARATE DIHYDRATE 80; 4.5 UG/1; UG/1
2 AEROSOL RESPIRATORY (INHALATION) 2 TIMES DAILY
Qty: 1 EACH | Refills: 2 | Status: SHIPPED | OUTPATIENT
Start: 2022-12-05

## 2022-12-05 RX ORDER — ALBUTEROL SULFATE 90 UG/1
2 AEROSOL, METERED RESPIRATORY (INHALATION) EVERY 4 HOURS PRN
Qty: 3 EACH | Refills: 1 | Status: SHIPPED | OUTPATIENT
Start: 2022-12-05

## 2022-12-12 ENCOUNTER — TELEPHONE (OUTPATIENT)
Dept: PHYSICAL THERAPY | Facility: HOSPITAL | Age: 57
End: 2022-12-12

## 2022-12-13 ENCOUNTER — APPOINTMENT (OUTPATIENT)
Dept: PHYSICAL THERAPY | Age: 57
End: 2022-12-13
Attending: INTERNAL MEDICINE
Payer: OTHER MISCELLANEOUS

## 2022-12-15 ENCOUNTER — APPOINTMENT (OUTPATIENT)
Dept: PHYSICAL THERAPY | Age: 57
End: 2022-12-15
Attending: INTERNAL MEDICINE
Payer: OTHER MISCELLANEOUS

## 2022-12-20 ENCOUNTER — APPOINTMENT (OUTPATIENT)
Dept: PHYSICAL THERAPY | Age: 57
End: 2022-12-20
Attending: INTERNAL MEDICINE
Payer: OTHER MISCELLANEOUS

## 2022-12-23 ENCOUNTER — APPOINTMENT (OUTPATIENT)
Dept: GENERAL RADIOLOGY | Facility: HOSPITAL | Age: 57
End: 2022-12-23
Attending: EMERGENCY MEDICINE
Payer: COMMERCIAL

## 2022-12-23 ENCOUNTER — NURSE TRIAGE (OUTPATIENT)
Dept: INTERNAL MEDICINE CLINIC | Facility: CLINIC | Age: 57
End: 2022-12-23

## 2022-12-23 ENCOUNTER — HOSPITAL ENCOUNTER (EMERGENCY)
Facility: HOSPITAL | Age: 57
Discharge: HOME | End: 2022-12-23
Attending: EMERGENCY MEDICINE
Payer: COMMERCIAL

## 2022-12-23 VITALS
HEART RATE: 74 BPM | OXYGEN SATURATION: 98 % | SYSTOLIC BLOOD PRESSURE: 135 MMHG | DIASTOLIC BLOOD PRESSURE: 78 MMHG | RESPIRATION RATE: 20 BRPM | TEMPERATURE: 98 F

## 2022-12-23 DIAGNOSIS — R07.89 CHEST PAIN, ATYPICAL: Primary | ICD-10-CM

## 2022-12-23 LAB
ANION GAP SERPL CALC-SCNC: 5 MMOL/L (ref 0–18)
BASOPHILS # BLD AUTO: 0.05 X10(3) UL (ref 0–0.2)
BASOPHILS NFR BLD AUTO: 0.6 %
BILIRUB UR QL: NEGATIVE
BUN BLD-MCNC: 14 MG/DL (ref 7–18)
BUN/CREAT SERPL: 10.6 (ref 10–20)
CALCIUM BLD-MCNC: 9.3 MG/DL (ref 8.5–10.1)
CHLORIDE SERPL-SCNC: 110 MMOL/L (ref 98–112)
CO2 SERPL-SCNC: 26 MMOL/L (ref 21–32)
COLOR UR: YELLOW
CREAT BLD-MCNC: 1.32 MG/DL
D DIMER PPP FEU-MCNC: 0.48 UG/ML FEU (ref ?–0.55)
DEPRECATED RDW RBC AUTO: 45.1 FL (ref 35.1–46.3)
EOSINOPHIL # BLD AUTO: 0.28 X10(3) UL (ref 0–0.7)
EOSINOPHIL NFR BLD AUTO: 3.1 %
ERYTHROCYTE [DISTWIDTH] IN BLOOD BY AUTOMATED COUNT: 13.9 % (ref 11–15)
GFR SERPLBLD BASED ON 1.73 SQ M-ARVRAT: 48 ML/MIN/1.73M2 (ref 60–?)
GLUCOSE BLD-MCNC: 101 MG/DL (ref 70–99)
GLUCOSE UR-MCNC: NEGATIVE MG/DL
HCT VFR BLD AUTO: 39.4 %
HGB BLD-MCNC: 12.6 G/DL
HGB UR QL STRIP.AUTO: NEGATIVE
IMM GRANULOCYTES # BLD AUTO: 0.03 X10(3) UL (ref 0–1)
IMM GRANULOCYTES NFR BLD: 0.3 %
KETONES UR-MCNC: NEGATIVE MG/DL
LYMPHOCYTES # BLD AUTO: 2.55 X10(3) UL (ref 1–4)
LYMPHOCYTES NFR BLD AUTO: 28.1 %
MCH RBC QN AUTO: 28.3 PG (ref 26–34)
MCHC RBC AUTO-ENTMCNC: 32 G/DL (ref 31–37)
MCV RBC AUTO: 88.5 FL
MONOCYTES # BLD AUTO: 0.7 X10(3) UL (ref 0.1–1)
MONOCYTES NFR BLD AUTO: 7.7 %
NEUTROPHILS # BLD AUTO: 5.47 X10 (3) UL (ref 1.5–7.7)
NEUTROPHILS # BLD AUTO: 5.47 X10(3) UL (ref 1.5–7.7)
NEUTROPHILS NFR BLD AUTO: 60.2 %
NITRITE UR QL STRIP.AUTO: NEGATIVE
OSMOLALITY SERPL CALC.SUM OF ELEC: 293 MOSM/KG (ref 275–295)
PH UR: 5.5 [PH] (ref 5–8)
PLATELET # BLD AUTO: 293 10(3)UL (ref 150–450)
POTASSIUM SERPL-SCNC: 3.8 MMOL/L (ref 3.5–5.1)
PROT UR-MCNC: NEGATIVE MG/DL
RBC # BLD AUTO: 4.45 X10(6)UL
SODIUM SERPL-SCNC: 141 MMOL/L (ref 136–145)
SP GR UR STRIP: 1.01 (ref 1–1.03)
TROPONIN I HIGH SENSITIVITY: 8 NG/L
UROBILINOGEN UR STRIP-ACNC: 0.2
WBC # BLD AUTO: 9.1 X10(3) UL (ref 4–11)

## 2022-12-23 PROCEDURE — 36415 COLL VENOUS BLD VENIPUNCTURE: CPT

## 2022-12-23 PROCEDURE — 81015 MICROSCOPIC EXAM OF URINE: CPT | Performed by: EMERGENCY MEDICINE

## 2022-12-23 PROCEDURE — 84484 ASSAY OF TROPONIN QUANT: CPT | Performed by: EMERGENCY MEDICINE

## 2022-12-23 PROCEDURE — 80048 BASIC METABOLIC PNL TOTAL CA: CPT | Performed by: EMERGENCY MEDICINE

## 2022-12-23 PROCEDURE — 99283 EMERGENCY DEPT VISIT LOW MDM: CPT

## 2022-12-23 PROCEDURE — 87086 URINE CULTURE/COLONY COUNT: CPT | Performed by: EMERGENCY MEDICINE

## 2022-12-23 PROCEDURE — 81001 URINALYSIS AUTO W/SCOPE: CPT | Performed by: EMERGENCY MEDICINE

## 2022-12-23 PROCEDURE — 85025 COMPLETE CBC W/AUTO DIFF WBC: CPT | Performed by: EMERGENCY MEDICINE

## 2022-12-23 PROCEDURE — 85379 FIBRIN DEGRADATION QUANT: CPT | Performed by: EMERGENCY MEDICINE

## 2022-12-23 PROCEDURE — 71045 X-RAY EXAM CHEST 1 VIEW: CPT | Performed by: EMERGENCY MEDICINE

## 2022-12-27 ENCOUNTER — TELEPHONE (OUTPATIENT)
Dept: PHYSICAL THERAPY | Facility: HOSPITAL | Age: 57
End: 2022-12-27

## 2022-12-27 ENCOUNTER — APPOINTMENT (OUTPATIENT)
Dept: PHYSICAL THERAPY | Age: 57
End: 2022-12-27
Attending: INTERNAL MEDICINE
Payer: OTHER MISCELLANEOUS

## 2022-12-29 ENCOUNTER — APPOINTMENT (OUTPATIENT)
Dept: PHYSICAL THERAPY | Age: 57
End: 2022-12-29
Attending: INTERNAL MEDICINE
Payer: OTHER MISCELLANEOUS

## 2022-12-31 ENCOUNTER — OFFICE VISIT (OUTPATIENT)
Dept: DERMATOLOGY | Age: 57
End: 2022-12-31

## 2022-12-31 DIAGNOSIS — L63.0 ALOPECIA TOTALIS: Primary | ICD-10-CM

## 2022-12-31 PROCEDURE — 99213 OFFICE O/P EST LOW 20 MIN: CPT | Performed by: DERMATOLOGY

## 2022-12-31 RX ORDER — BARICITINIB 4 MG/1
4 TABLET, FILM COATED ORAL DAILY
Qty: 30 TABLET | Refills: 5 | Status: ACTIVE | OUTPATIENT
Start: 2022-12-31

## 2023-01-05 ENCOUNTER — APPOINTMENT (OUTPATIENT)
Dept: PHYSICAL THERAPY | Age: 58
End: 2023-01-05
Attending: INTERNAL MEDICINE
Payer: OTHER MISCELLANEOUS

## 2023-01-09 ENCOUNTER — APPOINTMENT (OUTPATIENT)
Dept: PHYSICAL THERAPY | Age: 58
End: 2023-01-09
Attending: INTERNAL MEDICINE
Payer: OTHER MISCELLANEOUS

## 2023-01-16 RX ORDER — SPIRONOLACTONE 50 MG/1
TABLET, FILM COATED ORAL
Qty: 180 TABLET | Refills: 0 | Status: SHIPPED | OUTPATIENT
Start: 2023-01-16 | End: 2023-04-16

## 2023-02-15 ENCOUNTER — TELEMEDICINE (OUTPATIENT)
Dept: INTERNAL MEDICINE CLINIC | Facility: CLINIC | Age: 58
End: 2023-02-15

## 2023-02-15 DIAGNOSIS — Z12.31 BREAST CANCER SCREENING BY MAMMOGRAM: ICD-10-CM

## 2023-02-15 DIAGNOSIS — I10 ESSENTIAL HYPERTENSION: Primary | ICD-10-CM

## 2023-02-15 PROCEDURE — 99213 OFFICE O/P EST LOW 20 MIN: CPT | Performed by: INTERNAL MEDICINE

## 2023-02-15 RX ORDER — SPIRONOLACTONE 50 MG/1
50 TABLET, FILM COATED ORAL DAILY
Qty: 90 TABLET | Refills: 1 | Status: SHIPPED | OUTPATIENT
Start: 2023-02-15

## 2023-03-01 ENCOUNTER — TELEPHONE (OUTPATIENT)
Dept: DERMATOLOGY | Age: 58
End: 2023-03-01

## 2023-03-08 ENCOUNTER — TELEPHONE (OUTPATIENT)
Dept: DERMATOLOGY | Age: 58
End: 2023-03-08

## 2023-03-09 ENCOUNTER — OFFICE VISIT (OUTPATIENT)
Dept: GASTROENTEROLOGY | Facility: CLINIC | Age: 58
End: 2023-03-09

## 2023-03-09 VITALS — DIASTOLIC BLOOD PRESSURE: 84 MMHG | SYSTOLIC BLOOD PRESSURE: 139 MMHG | HEART RATE: 73 BPM

## 2023-03-09 DIAGNOSIS — K59.09 CHRONIC CONSTIPATION: Primary | ICD-10-CM

## 2023-03-09 PROCEDURE — 3008F BODY MASS INDEX DOCD: CPT | Performed by: INTERNAL MEDICINE

## 2023-03-09 PROCEDURE — 99213 OFFICE O/P EST LOW 20 MIN: CPT | Performed by: INTERNAL MEDICINE

## 2023-03-09 PROCEDURE — 3079F DIAST BP 80-89 MM HG: CPT | Performed by: INTERNAL MEDICINE

## 2023-03-09 PROCEDURE — 3075F SYST BP GE 130 - 139MM HG: CPT | Performed by: INTERNAL MEDICINE

## 2023-03-09 RX ORDER — LINACLOTIDE 145 UG/1
1 CAPSULE, GELATIN COATED ORAL DAILY
Qty: 90 CAPSULE | Refills: 0 | Status: SHIPPED | OUTPATIENT
Start: 2023-03-09 | End: 2023-03-09

## 2023-03-09 NOTE — PATIENT INSTRUCTIONS
Linzess 290mcg/day  Colonoscopy in April 2024  Washout as below  -------------    WASHOUT INSTRUCTIONS:  1. Pick a day you will be at home, close to a toilet. 2. Have a some juice for breakfast.   3. Around 10AM start drinking the solution prescribed (for trilyte, drink 1 cup every 15 minutes) over the course of 3-5 hours. IF having nausea/bloating, take a break for 30 minutes, walk around and then resume drinking. If vomiting, take a break for 1 hour, if symptoms improve, and you feel well, can resume drinking. 4. Goal is to finish solution or until stools turn liquid yellow. 5. For lunch you should have a liquid diet (7up, water, gingerale, etc)  6. After prep, for dinner you can have a light dinner. 7. Resume regular meals the following day, along with laxative medications. 8. You should continue your regular medications during the washout day.

## 2023-03-14 PROBLEM — K59.09 CHRONIC CONSTIPATION: Status: ACTIVE | Noted: 2023-03-14

## 2023-03-21 ENCOUNTER — OFFICE VISIT (OUTPATIENT)
Dept: SURGERY | Facility: CLINIC | Age: 58
End: 2023-03-21
Payer: COMMERCIAL

## 2023-03-21 VITALS
WEIGHT: 234 LBS | HEIGHT: 69 IN | DIASTOLIC BLOOD PRESSURE: 80 MMHG | SYSTOLIC BLOOD PRESSURE: 122 MMHG | BODY MASS INDEX: 34.66 KG/M2

## 2023-03-21 DIAGNOSIS — E88.81 INSULIN RESISTANCE: ICD-10-CM

## 2023-03-21 DIAGNOSIS — I10 ESSENTIAL HYPERTENSION: Primary | ICD-10-CM

## 2023-03-21 DIAGNOSIS — Z51.81 ENCOUNTER FOR THERAPEUTIC DRUG MONITORING: ICD-10-CM

## 2023-03-21 DIAGNOSIS — E66.9 OBESITY (BMI 30-39.9): ICD-10-CM

## 2023-03-21 DIAGNOSIS — E55.9 VITAMIN D DEFICIENCY: ICD-10-CM

## 2023-03-21 PROCEDURE — 99214 OFFICE O/P EST MOD 30 MIN: CPT | Performed by: INTERNAL MEDICINE

## 2023-03-21 RX ORDER — TOPIRAMATE 50 MG/1
50 TABLET, FILM COATED ORAL 2 TIMES DAILY
Qty: 180 TABLET | Refills: 1 | Status: SHIPPED | OUTPATIENT
Start: 2023-03-21 | End: 2023-06-19

## 2023-03-21 RX ORDER — DULAGLUTIDE 0.75 MG/.5ML
0.75 INJECTION, SOLUTION SUBCUTANEOUS WEEKLY
Qty: 2 ML | Refills: 2 | Status: SHIPPED | OUTPATIENT
Start: 2023-03-21

## 2023-03-25 ENCOUNTER — APPOINTMENT (OUTPATIENT)
Dept: DERMATOLOGY | Age: 58
End: 2023-03-25

## 2023-03-29 ENCOUNTER — TELEPHONE (OUTPATIENT)
Dept: DERMATOLOGY | Age: 58
End: 2023-03-29

## 2023-03-29 DIAGNOSIS — L63.0 ALOPECIA TOTALIS: Primary | ICD-10-CM

## 2023-04-22 ENCOUNTER — HOSPITAL ENCOUNTER (OUTPATIENT)
Dept: MAMMOGRAPHY | Age: 58
Discharge: HOME | End: 2023-04-22
Attending: INTERNAL MEDICINE
Payer: COMMERCIAL

## 2023-04-22 ENCOUNTER — OFFICE VISIT (OUTPATIENT)
Dept: DERMATOLOGY | Age: 58
End: 2023-04-22

## 2023-04-22 ENCOUNTER — LAB SERVICES (OUTPATIENT)
Dept: LAB | Age: 58
End: 2023-04-22

## 2023-04-22 DIAGNOSIS — Z12.31 BREAST CANCER SCREENING BY MAMMOGRAM: ICD-10-CM

## 2023-04-22 DIAGNOSIS — L63.0 ALOPECIA TOTALIS: Primary | ICD-10-CM

## 2023-04-22 DIAGNOSIS — L63.0 ALOPECIA TOTALIS: ICD-10-CM

## 2023-04-22 LAB
ALBUMIN SERPL-MCNC: 3.6 G/DL (ref 3.6–5.1)
ALBUMIN/GLOB SERPL: 1.2 {RATIO} (ref 1–2.4)
ALP SERPL-CCNC: 85 UNITS/L (ref 45–117)
ALT SERPL-CCNC: 17 UNITS/L
ANION GAP SERPL CALC-SCNC: 13 MMOL/L (ref 7–19)
AST SERPL-CCNC: 15 UNITS/L
BASOPHILS # BLD: 0 K/MCL (ref 0–0.3)
BASOPHILS NFR BLD: 0 %
BILIRUB SERPL-MCNC: 0.7 MG/DL (ref 0.2–1)
BUN SERPL-MCNC: 13 MG/DL (ref 6–20)
BUN/CREAT SERPL: 11 (ref 7–25)
CALCIUM SERPL-MCNC: 9.2 MG/DL (ref 8.4–10.2)
CHLORIDE SERPL-SCNC: 108 MMOL/L (ref 97–110)
CHOLEST SERPL-MCNC: 146 MG/DL
CHOLEST/HDLC SERPL: 3 {RATIO}
CO2 SERPL-SCNC: 27 MMOL/L (ref 21–32)
CREAT SERPL-MCNC: 1.21 MG/DL (ref 0.51–0.95)
DEPRECATED RDW RBC: 48.5 FL (ref 39–50)
EOSINOPHIL # BLD: 0.1 K/MCL (ref 0–0.5)
EOSINOPHIL NFR BLD: 2 %
ERYTHROCYTE [DISTWIDTH] IN BLOOD: 14.6 % (ref 11–15)
FASTING DURATION TIME PATIENT: ABNORMAL H
FASTING DURATION TIME PATIENT: ABNORMAL H
GFR SERPLBLD BASED ON 1.73 SQ M-ARVRAT: 53 ML/MIN
GLOBULIN SER-MCNC: 3 G/DL (ref 2–4)
GLUCOSE SERPL-MCNC: 71 MG/DL (ref 70–99)
HCT VFR BLD CALC: 41 % (ref 36–46.5)
HDLC SERPL-MCNC: 49 MG/DL
HGB BLD-MCNC: 13 G/DL (ref 12–15.5)
IMM GRANULOCYTES # BLD AUTO: 0 K/MCL (ref 0–0.2)
IMM GRANULOCYTES # BLD: 0 %
LDLC SERPL CALC-MCNC: 83 MG/DL
LYMPHOCYTES # BLD: 2.3 K/MCL (ref 1–4)
LYMPHOCYTES NFR BLD: 34 %
MCH RBC QN AUTO: 28.8 PG (ref 26–34)
MCHC RBC AUTO-ENTMCNC: 31.7 G/DL (ref 32–36.5)
MCV RBC AUTO: 90.7 FL (ref 78–100)
MONOCYTES # BLD: 0.5 K/MCL (ref 0.3–0.9)
MONOCYTES NFR BLD: 8 %
NEUTROPHILS # BLD: 3.8 K/MCL (ref 1.8–7.7)
NEUTROPHILS NFR BLD: 56 %
NONHDLC SERPL-MCNC: 97 MG/DL
NRBC BLD MANUAL-RTO: 0 /100 WBC
PLATELET # BLD AUTO: 336 K/MCL (ref 140–450)
POTASSIUM SERPL-SCNC: 4.1 MMOL/L (ref 3.4–5.1)
PROT SERPL-MCNC: 6.6 G/DL (ref 6.4–8.2)
RBC # BLD: 4.52 MIL/MCL (ref 4–5.2)
SODIUM SERPL-SCNC: 144 MMOL/L (ref 135–145)
TRIGL SERPL-MCNC: 70 MG/DL
WBC # BLD: 6.9 K/MCL (ref 4.2–11)

## 2023-04-22 PROCEDURE — 36415 COLL VENOUS BLD VENIPUNCTURE: CPT | Performed by: DERMATOLOGY

## 2023-04-22 PROCEDURE — 80061 LIPID PANEL: CPT | Performed by: INTERNAL MEDICINE

## 2023-04-22 PROCEDURE — 99213 OFFICE O/P EST LOW 20 MIN: CPT | Performed by: DERMATOLOGY

## 2023-04-22 PROCEDURE — 77063 BREAST TOMOSYNTHESIS BI: CPT | Performed by: INTERNAL MEDICINE

## 2023-04-22 PROCEDURE — 85025 COMPLETE CBC W/AUTO DIFF WBC: CPT | Performed by: INTERNAL MEDICINE

## 2023-04-22 PROCEDURE — 80053 COMPREHEN METABOLIC PANEL: CPT | Performed by: INTERNAL MEDICINE

## 2023-04-22 PROCEDURE — 77067 SCR MAMMO BI INCL CAD: CPT | Performed by: INTERNAL MEDICINE

## 2023-04-24 ENCOUNTER — PATIENT MESSAGE (OUTPATIENT)
Facility: CLINIC | Age: 58
End: 2023-04-24

## 2023-04-24 ENCOUNTER — TELEPHONE (OUTPATIENT)
Dept: INTERNAL MEDICINE CLINIC | Facility: CLINIC | Age: 58
End: 2023-04-24

## 2023-04-24 DIAGNOSIS — R92.8 ABNORMAL MAMMOGRAM OF RIGHT BREAST: Primary | ICD-10-CM

## 2023-04-24 NOTE — TELEPHONE ENCOUNTER
RN contacted Froylan Martin, gave written approval to sign the order. RN signed the diagnostic mammogram with US order. RN called patient and left a complete message (see SCHUYLER), number provided to schedule the mammogram.    Prognosis [facility] Information Systemst also sent.

## 2023-04-24 NOTE — TELEPHONE ENCOUNTER
From: Primitivo Lucero  To: Emily Dacosta MD  Sent: 4/24/2023 7:00 AM CDT  Subject: Abnormal mammogram results    Good morning my mammogram was abnormal and the recommendation is that I has an ultrasound do you have to order it or will they call me. I would like to schedule test asap. I could not sleep thinking about this last night.

## 2023-04-24 NOTE — TELEPHONE ENCOUNTER
Patient called and upset, states that she has abnormal mammogram and she's been anxious since last night,unable to sleep. States that she sent a message via [employer] and the [address] responded that the mammogram department will be calling her regarding the order for the [address]. States that she called mammogram and told her that she needs to get an order from PCP prior to scheduling. Informed that RN will take care of it ,informed also that usually the mammogram department is the one who will call the patient and order the US and schedule it,states she has not received any call. RN called mammogram [id number] and spoke with Kimberley Mendes,  per their protocol, they will call the patient and will inform about the results and they will order the needed additional test, BUT since patient did it on a weekend  Saturday and today is just Monday 1000 am, somebody will be calling her regarding this later. Kimberley Mendes requesting an order for diagnostic mammogram with US right breast order so that the patient can schedule it ,can call [phone] to schedule .

## 2023-04-26 ENCOUNTER — HOSPITAL ENCOUNTER (OUTPATIENT)
Dept: MAMMOGRAPHY | Facility: HOSPITAL | Age: 58
Discharge: HOME | End: 2023-04-26
Attending: INTERNAL MEDICINE
Payer: COMMERCIAL

## 2023-04-26 ENCOUNTER — TELEPHONE (OUTPATIENT)
Dept: ULTRASOUND IMAGING | Facility: HOSPITAL | Age: 58
End: 2023-04-26

## 2023-04-26 DIAGNOSIS — R92.8 ABNORMAL MAMMOGRAM: ICD-10-CM

## 2023-04-26 PROCEDURE — 77065 DX MAMMO INCL CAD UNI: CPT | Performed by: INTERNAL MEDICINE

## 2023-04-26 PROCEDURE — 77061 BREAST TOMOSYNTHESIS UNI: CPT | Performed by: INTERNAL MEDICINE

## 2023-04-26 NOTE — TELEPHONE ENCOUNTER
Called Mark Holguin had requested for an earlier appt if one came available she was offered Friday 4/28/23 checking in a t715 dx Haylee Robles verbalizes she would like to amaya her appt from Tuesday to Friday this week. Sent message to rad office to change apt.

## 2023-04-27 NOTE — DISCHARGE INSTRUCTIONS
The Doctor (Radiologist) who performed your procedure was: DR. Stephanie Cooley an ice pack over the biopsy site on top of your bra or on top of the ACE wrap (never apply ice directly over skin) for 10-15 minutes of every hour until bedtime for your comfort and to decrease bleeding. Keep your sports bra or the ACE wrap (stereotactic and MRI biopsy) in place for 24 hours after your biopsy. This compression decreases bleeding and breast movement for your comfort. Wear a supportive bra for the next couple of days for comfort (sports bra for sleep). Continue to wear, preferably, a sports bra or good supportive bra for 1 week and take off only to shower. No baths or showers during the first 24 hours after biopsy. After this time you may take a shower. It's okay if the strips get wet but do not soak them. NO saunas, hot tubs or swimming until steri-strips fall off (approx. 5 days). This prevents infection and allows time for them to completely close and heal.  DO NOT remove the steri-strips. They will fall off in 5 days. If any type of irritation (redness, itching or blisters) develops in the area around the steri-strips, remove them gently. If the steri-strips do not fall off after 5 days, gently remove them. Keep the area clean and dry. It is normal to have mild discomfort and bruising at the biopsy site. You may take Tylenol as needed for discomfort, as long as you have no allergies to Tylenol. Do not take aspirin, motrin, ibuprofen or any medication containing NSAID (non-steroidal anti-inflammatory drug) product for 48 hours. DO NOT participate in strenuous activity (aerobics, heavy lifting, housework, gardening, etc.) 48 hours after your biopsy to prevent bleeding. You will receive results in 2-3 business days. If you are having an MRI breast biopsy or an Ultrasound guided breast biopsy, you will be billed for the biopsy and unilateral mammogram separately.   If you have any questions about the procedure or your results, please contact the Breast Care Coordinator Nurse at [phone]. Notify your ordering physician or primary physician for increased bleeding, pain or fever over 100. Or contact a Radiology Nurse at [phone] between 8am-4pm (after 4pm, your call will be directed to the [place] [address] Emergency Room).

## 2023-04-28 ENCOUNTER — HOSPITAL ENCOUNTER (OUTPATIENT)
Dept: MAMMOGRAPHY | Facility: HOSPITAL | Age: 58
Discharge: HOME | End: 2023-04-28
Attending: INTERNAL MEDICINE
Payer: COMMERCIAL

## 2023-04-28 DIAGNOSIS — R92.1 BREAST CALCIFICATION, RIGHT: ICD-10-CM

## 2023-04-28 PROCEDURE — 19081 BX BREAST 1ST LESION STRTCTC: CPT | Performed by: INTERNAL MEDICINE

## 2023-04-28 PROCEDURE — 88305 TISSUE EXAM BY PATHOLOGIST: CPT | Performed by: INTERNAL MEDICINE

## 2023-04-28 NOTE — PROCEDURES
[facility]  Procedure Note    Tommy Orellana Patient Status:  Outpatient    1965 MRN [id number]   Location [address] Attending Dennis Melo MD   [facility] Day # 0 PCP Jina Fitzpatrick MD     Procedure: Right breast stereotactic/migel guided biopsy    Pre-Procedure Diagnosis:  Right breast calcifications    Post-Procedure Diagnosis: Right breast calcifications    Anesthesia:  Local    Findings:  Right breast calcifications    Specimens: multiple cores    Blood Loss:  minimal    Tourniquet Time: none  Complications:  None  Drains:  none        Nicole Minor MD  2023

## 2023-05-01 ENCOUNTER — TELEPHONE (OUTPATIENT)
Dept: ULTRASOUND IMAGING | Facility: HOSPITAL | Age: 58
End: 2023-05-01

## 2023-05-01 NOTE — TELEPHONE ENCOUNTER
Follow up call post bx left detail message to return call to [phone] or to follow up with Dr Jessa Anglin  with any questions or concerns post

## 2023-05-25 ENCOUNTER — TELEPHONE (OUTPATIENT)
Dept: DERMATOLOGY | Age: 58
End: 2023-05-25

## 2023-05-30 ENCOUNTER — OFFICE VISIT (OUTPATIENT)
Facility: CLINIC | Age: 58
End: 2023-05-30

## 2023-05-30 VITALS
HEIGHT: 69 IN | HEART RATE: 68 BPM | BODY MASS INDEX: 31.55 KG/M2 | SYSTOLIC BLOOD PRESSURE: 130 MMHG | RESPIRATION RATE: 18 BRPM | DIASTOLIC BLOOD PRESSURE: 78 MMHG | OXYGEN SATURATION: 98 % | TEMPERATURE: 99 F | WEIGHT: 213 LBS

## 2023-05-30 DIAGNOSIS — Z52.4 KIDNEY DONOR: ICD-10-CM

## 2023-05-30 DIAGNOSIS — Z00.00 ROUTINE HEALTH MAINTENANCE: Primary | ICD-10-CM

## 2023-05-30 DIAGNOSIS — J45.40 MODERATE PERSISTENT ASTHMA WITHOUT COMPLICATION: ICD-10-CM

## 2023-05-30 DIAGNOSIS — I10 ESSENTIAL HYPERTENSION: ICD-10-CM

## 2023-05-30 PROBLEM — S40.021A CONTUSION OF RIGHT ARM: Status: RESOLVED | Noted: 2022-03-21 | Resolved: 2023-05-30

## 2023-05-30 PROBLEM — R20.2 NUMBNESS AND TINGLING IN RIGHT HAND: Status: RESOLVED | Noted: 2022-03-21 | Resolved: 2023-05-30

## 2023-05-30 PROBLEM — R20.0 NUMBNESS AND TINGLING IN RIGHT HAND: Status: RESOLVED | Noted: 2022-03-21 | Resolved: 2023-05-30

## 2023-05-30 PROCEDURE — 3075F SYST BP GE 130 - 139MM HG: CPT | Performed by: INTERNAL MEDICINE

## 2023-05-30 PROCEDURE — 3078F DIAST BP <80 MM HG: CPT | Performed by: INTERNAL MEDICINE

## 2023-05-30 PROCEDURE — 99214 OFFICE O/P EST MOD 30 MIN: CPT | Performed by: INTERNAL MEDICINE

## 2023-05-30 PROCEDURE — 3008F BODY MASS INDEX DOCD: CPT | Performed by: INTERNAL MEDICINE

## 2023-05-30 PROCEDURE — 99396 PREV VISIT EST AGE 40-64: CPT | Performed by: INTERNAL MEDICINE

## 2023-05-30 RX ORDER — SPIRONOLACTONE 50 MG/1
50 TABLET, FILM COATED ORAL DAILY
Qty: 90 TABLET | Refills: 1 | Status: SHIPPED | OUTPATIENT
Start: 2023-05-30

## 2023-05-31 LAB — HPV I/H RISK 1 DNA SPEC QL NAA+PROBE: NEGATIVE

## 2023-06-05 RX ORDER — DULAGLUTIDE 1.5 MG/.5ML
1.5 INJECTION, SOLUTION SUBCUTANEOUS WEEKLY
Qty: 6 ML | Refills: 2 | Status: SHIPPED | OUTPATIENT
Start: 2023-06-05

## 2023-06-07 ENCOUNTER — OFFICE VISIT (OUTPATIENT)
Dept: PULMONOLOGY | Facility: CLINIC | Age: 58
End: 2023-06-07

## 2023-06-07 VITALS
OXYGEN SATURATION: 100 % | SYSTOLIC BLOOD PRESSURE: 121 MMHG | DIASTOLIC BLOOD PRESSURE: 82 MMHG | HEART RATE: 76 BPM | HEIGHT: 69 IN | BODY MASS INDEX: 31 KG/M2

## 2023-06-07 DIAGNOSIS — J45.30 MILD PERSISTENT ASTHMA WITHOUT COMPLICATION: Primary | ICD-10-CM

## 2023-06-07 PROCEDURE — 3008F BODY MASS INDEX DOCD: CPT | Performed by: INTERNAL MEDICINE

## 2023-06-07 PROCEDURE — 3074F SYST BP LT 130 MM HG: CPT | Performed by: INTERNAL MEDICINE

## 2023-06-07 PROCEDURE — 3079F DIAST BP 80-89 MM HG: CPT | Performed by: INTERNAL MEDICINE

## 2023-06-07 PROCEDURE — 99214 OFFICE O/P EST MOD 30 MIN: CPT | Performed by: INTERNAL MEDICINE

## 2023-06-08 LAB
LAST PAP RESULT: NORMAL
PAP HISTORY (OTHER THAN LAST PAP): NORMAL

## 2023-06-09 PROBLEM — R87.610 ATYPICAL SQUAMOUS CELLS OF UNDETERMINED SIGNIFICANCE ON CYTOLOGIC SMEAR OF CERVIX (ASC-US): Status: ACTIVE | Noted: 2023-06-09

## 2023-06-19 ENCOUNTER — PATIENT MESSAGE (OUTPATIENT)
Dept: DERMATOLOGY CLINIC | Facility: CLINIC | Age: 58
End: 2023-06-19

## 2023-06-19 NOTE — TELEPHONE ENCOUNTER
LOV 8/3/22. Pt calling states she has an eczema flare up to BUE. States she is in \"extreme 10.5/10 pain\" denies s/s of infection, no redness, swelling, no open skin, no pus, no fever/chills. I advised her to go to ER due to extreme pain. She refused. Pt very demanding. 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 She's been using TAC and claritin with no results. Of note she's also been seen at [facility] but states it's for alopecia only. Please advise if you wanted her added on this week?

## 2023-06-20 NOTE — TELEPHONE ENCOUNTER
I do not have an opening until Friday, if that is still open. We can squeeze her in tomorrow at 11 AM only for brief visit. NK or DM have a couple of openings later today.

## 2023-06-21 ENCOUNTER — OFFICE VISIT (OUTPATIENT)
Dept: DERMATOLOGY CLINIC | Facility: CLINIC | Age: 58
End: 2023-06-21

## 2023-06-21 DIAGNOSIS — L20.89 OTHER ATOPIC DERMATITIS: Primary | ICD-10-CM

## 2023-06-21 DIAGNOSIS — Z51.81 MEDICATION MONITORING ENCOUNTER: ICD-10-CM

## 2023-06-21 DIAGNOSIS — L56.8 PHOTOSENSITIVITY DERMATITIS DUE TO SUN: ICD-10-CM

## 2023-06-21 PROCEDURE — 99213 OFFICE O/P EST LOW 20 MIN: CPT | Performed by: DERMATOLOGY

## 2023-06-21 RX ORDER — PREDNISONE 10 MG/1
TABLET ORAL
Qty: 30 TABLET | Refills: 0 | Status: SHIPPED | OUTPATIENT
Start: 2023-06-21 | End: 2023-07-03

## 2023-06-30 ENCOUNTER — OFFICE VISIT (OUTPATIENT)
Dept: OBGYN CLINIC | Facility: CLINIC | Age: 58
End: 2023-06-30

## 2023-06-30 VITALS — HEART RATE: 89 BPM | DIASTOLIC BLOOD PRESSURE: 70 MMHG | SYSTOLIC BLOOD PRESSURE: 105 MMHG

## 2023-06-30 DIAGNOSIS — R87.610 ASCUS OF CERVIX WITH NEGATIVE HIGH RISK HPV: Primary | ICD-10-CM

## 2023-06-30 PROCEDURE — 3078F DIAST BP <80 MM HG: CPT | Performed by: OBSTETRICS & GYNECOLOGY

## 2023-06-30 PROCEDURE — 3074F SYST BP LT 130 MM HG: CPT | Performed by: OBSTETRICS & GYNECOLOGY

## 2023-06-30 PROCEDURE — 99212 OFFICE O/P EST SF 10 MIN: CPT | Performed by: OBSTETRICS & GYNECOLOGY

## 2023-06-30 RX ORDER — TOPIRAMATE 50 MG/1
TABLET, FILM COATED ORAL
COMMUNITY
Start: 2023-06-23

## 2023-07-01 ENCOUNTER — APPOINTMENT (OUTPATIENT)
Dept: DERMATOLOGY | Age: 58
End: 2023-07-01

## 2023-08-07 ENCOUNTER — OFFICE VISIT (OUTPATIENT)
Dept: SURGERY | Facility: CLINIC | Age: 58
End: 2023-08-07
Payer: COMMERCIAL

## 2023-08-07 VITALS
DIASTOLIC BLOOD PRESSURE: 81 MMHG | HEART RATE: 89 BPM | BODY MASS INDEX: 30.27 KG/M2 | OXYGEN SATURATION: 97 % | HEIGHT: 69 IN | SYSTOLIC BLOOD PRESSURE: 126 MMHG | WEIGHT: 204.38 LBS

## 2023-08-07 DIAGNOSIS — E66.9 OBESITY (BMI 30-39.9): ICD-10-CM

## 2023-08-07 DIAGNOSIS — I10 ESSENTIAL HYPERTENSION: Primary | ICD-10-CM

## 2023-08-07 DIAGNOSIS — E88.81 INSULIN RESISTANCE: ICD-10-CM

## 2023-08-07 DIAGNOSIS — Z51.81 ENCOUNTER FOR THERAPEUTIC DRUG MONITORING: ICD-10-CM

## 2023-08-07 PROBLEM — E88.819 INSULIN RESISTANCE: Status: ACTIVE | Noted: 2023-08-07

## 2023-08-07 RX ORDER — DULAGLUTIDE 0.75 MG/.5ML
0.75 INJECTION, SOLUTION SUBCUTANEOUS WEEKLY
Qty: 6 ML | Refills: 2 | Status: SHIPPED | OUTPATIENT
Start: 2023-08-07 | End: 2023-11-05

## 2023-08-24 ENCOUNTER — HOSPITAL ENCOUNTER (OUTPATIENT)
Age: 58
Discharge: HOME | End: 2023-08-24
Payer: COMMERCIAL

## 2023-08-24 VITALS
TEMPERATURE: 101 F | OXYGEN SATURATION: 100 % | DIASTOLIC BLOOD PRESSURE: 85 MMHG | HEART RATE: 104 BPM | SYSTOLIC BLOOD PRESSURE: 140 MMHG | RESPIRATION RATE: 18 BRPM

## 2023-08-24 DIAGNOSIS — U07.1 COVID-19: Primary | ICD-10-CM

## 2023-08-24 DIAGNOSIS — Z20.822 ENCOUNTER FOR LABORATORY TESTING FOR COVID-19 VIRUS: ICD-10-CM

## 2023-08-24 LAB
POCT INFLUENZA A: NEGATIVE
POCT INFLUENZA B: NEGATIVE
S PYO AG THROAT QL: NEGATIVE
SARS-COV-2 RNA RESP QL NAA+PROBE: DETECTED

## 2023-08-24 PROCEDURE — 87502 INFLUENZA DNA AMP PROBE: CPT | Performed by: NURSE PRACTITIONER

## 2023-08-24 PROCEDURE — 99214 OFFICE O/P EST MOD 30 MIN: CPT | Performed by: NURSE PRACTITIONER

## 2023-08-24 PROCEDURE — 87880 STREP A ASSAY W/OPTIC: CPT | Performed by: NURSE PRACTITIONER

## 2023-08-24 PROCEDURE — U0002 COVID-19 LAB TEST NON-CDC: HCPCS | Performed by: NURSE PRACTITIONER

## 2023-08-24 RX ORDER — NIRMATRELVIR AND RITONAVIR 150-100 MG
KIT ORAL
Qty: 20 TABLET | Refills: 0 | Status: SHIPPED | OUTPATIENT
Start: 2023-08-24 | End: 2023-08-29

## 2023-08-25 ENCOUNTER — TELEPHONE (OUTPATIENT)
Dept: PULMONOLOGY | Facility: CLINIC | Age: 58
End: 2023-08-25

## 2023-08-25 RX ORDER — CODEINE PHOSPHATE AND GUAIFENESIN 10; 100 MG/5ML; MG/5ML
5 SOLUTION ORAL EVERY 6 HOURS PRN
Refills: 0 | Status: CANCELLED | OUTPATIENT
Start: 2023-08-25

## 2023-08-25 RX ORDER — PREDNISONE 10 MG/1
TABLET ORAL
Qty: 18 TABLET | Refills: 0 | Status: SHIPPED | OUTPATIENT
Start: 2023-08-25

## 2023-08-25 NOTE — TELEPHONE ENCOUNTER
Dr. Meek Lee requesting cough syrup as well. States she is coughing so much she can't catch her breath. Confirmed with [facility] Cheratussin in stock if you would like to order. Order pended.

## 2023-08-25 NOTE — TELEPHONE ENCOUNTER
----- Message from Bib French sent at 8/25/2023 12:28 PM CDT -----  Regarding: Tested positive for COVID yesterday   Contact: [phone]  Eloisa Zarco. I tested positive  for COVID yesterday in immediate care [place]. Do I need to take prednisone. My worst symptoms is this horrible cough and some wheezing after a bad coughing  spell.

## 2023-08-25 NOTE — TELEPHONE ENCOUNTER
Dr. Annette Regalado tested positive for Covid. Prescribed Paxlovid but patient doesn't want to take it. Asking for short course of steroids to help with coughing.

## 2023-08-28 NOTE — TELEPHONE ENCOUNTER
Harriet Mayorga MD   to Me       8/25/23  5:07 PM   Over-the-counter Robitussin DM every 6 hours as needed

## 2023-09-06 RX ORDER — TOPIRAMATE 50 MG/1
50 TABLET, FILM COATED ORAL 2 TIMES DAILY
Qty: 180 TABLET | Refills: 1 | Status: SHIPPED | OUTPATIENT
Start: 2023-09-06

## 2023-09-27 RX ORDER — DULAGLUTIDE 1.5 MG/.5ML
1.5 INJECTION, SOLUTION SUBCUTANEOUS WEEKLY
Qty: 6 ML | Refills: 1 | Status: SHIPPED | OUTPATIENT
Start: 2023-09-27

## 2023-10-03 ENCOUNTER — IMMUNIZATION (OUTPATIENT)
Dept: LAB | Age: 58
End: 2023-10-03
Attending: EMERGENCY MEDICINE
Payer: COMMERCIAL

## 2023-10-03 DIAGNOSIS — Z23 NEED FOR VACCINATION: Primary | ICD-10-CM

## 2023-10-03 PROCEDURE — 90480 ADMN SARSCOV2 VAC 1/ONLY CMP: CPT

## 2023-10-04 NOTE — LETTER
AUTHORIZATION FOR SURGICAL OPERATION OR OTHER PROCEDURE    1.  I hereby authorize Dr. Elías Gomez , and [facility] staff assigned to my case to perform the following operation and/or procedure at the [facility]:  ENDOMETRIAL BIOPSY  ________________ Patient has no signs of headaches, dizziness, hearing loss, sore throat, recent cold, double vision, blurred vision, itchy eyes, eye redness or eye discharge. or ringing in ears  NEUROLOGIC:  Patient has no signs of muscle weakness, numbness/tingling, loss of balance or seizure disorder. or fainting  CARDIOVASCULAR:  Patient has no signs of chest pain, palpitations, rheumatic fever or heart murmur. RESPIRATORY:  Patient has no signs of chronic cough, wheezing, pain on breathing or shortness of breath. or difficulty breathing  GASTROINTESTINAL:  Patient has no signs of nausea/vomiting, difficulty swallowing, gas/bloating, indigestion, abdominal pain, diarrhea, bloody stools or hemorrhoids. GENITOURINARY:  Patient has no signs of blood in the urine, burning sensation, frequent urinating or incontinence. , painful urinating or bladder/kidney infection  MUSCULOSKELETAL:  Patient has no signs of fracture/dislocation, sprain/strain, tendonitis, joint stiffness, joint pain, rheumatoid disease, gout or swelling in feet. INTEGUMENTARY:  Patient has no signs of rash/itching, psoriasis, Raynaud's phenomenon or varicose veins. EMOTIONAL:  Patient has no signs of bipolar disorder or change in mood. , anxiety, depression or memory loss    Vitals:  Date BP Pulse Temp (F) Resp. (per min.) Height (Total in.) Weight (lbs.) BMI   09/12/2023     72.00 215.00 29.16     Physical Examination:    General:  The patient appears healthy. Cardiovascular:  Arterial pulses are normal.  Skin:  The skin is normal appearing with no contusions or wounds noted. Heart- RRR  Lungs-CTA anel  Abdomen- +BS,soft,nontender   Musculoskeletal:  He stands in 40 degrees of flexion. He is unable to extend towards neutral. There is tenderness around the PSIS bilaterally. The thoracolumbar spine has normal kyphosis, a normal appearance, and no scoliosis. There is full range of motion of the cervical and thoracic spine and of the hips, knees, and ankles. Time:  ________ A. M.  P.M.        Patient Name:  ______________________________________________________  (please print)      Patient signature:  ___________________________________________________             Relationship to Patient:

## 2023-10-21 ENCOUNTER — APPOINTMENT (OUTPATIENT)
Dept: DERMATOLOGY | Age: 58
End: 2023-10-21

## 2023-10-31 DIAGNOSIS — I10 ESSENTIAL HYPERTENSION: ICD-10-CM

## 2023-11-01 RX ORDER — SPIRONOLACTONE 50 MG/1
50 TABLET, FILM COATED ORAL DAILY
Qty: 90 TABLET | Refills: 1 | Status: SHIPPED | OUTPATIENT
Start: 2023-11-01

## 2023-11-01 NOTE — TELEPHONE ENCOUNTER
Please review; protocol failed. No active /future labs noted     Requested Prescriptions   Pending Prescriptions Disp Refills    SPIRONOLACTONE 50 MG Oral Tab [Pharmacy Med Name: SPIRONOLACTONE 50 MG TABLET] 90 tablet 1     Sig: TAKE 1 TABLET BY MOUTH EVERY DAY       Hypertensive Medications Protocol Failed - 10/31/2023  5:25 PM        Failed - Last BP reading less than 140/90     BP Readings from Last 1 Encounters:  08/24/23 : 140/85              Failed - CMP or BMP in past 6 months     No results found for this or any previous visit (from the past 4392 hour(s)).             Failed - EGFRCR or GFRAA > 50     GFR Evaluation  EGFRCR: 48 , resulted on 12/23/2022          Passed - In person appointment in the past 12 or next 3 months     Recent Outpatient Visits              2 months ago Essential hypertension    [address], [address] [facility],[address], Osei Holbrook MD    Office Visit    4 months ago ASCUS of cervix with negative high risk HPV    [facility], [address], [address] [facility] Koby Turpin MD    Office Visit    4 months ago Other atopic dermatitis    Chana Kumar MD    Office Visit    4 months ago Mild persistent asthma without complication    Ben Ramos MD    Office Visit    5 months ago Routine health maintenance    [address], Forrest Finnegan MD    Office Visit          Future Appointments         Provider Department Appt Notes    In 3 weeks Dante Watson MD [address], [address] [place] [address] f/up    In 1 month Rebecca Marsh MD [address], [address] [facility],[address], [place] BCBS PPO  NON SX F/U                      Passed - In person appointment or virtual visit in the past 6 months     Recent Outpatient Visits              2 months ago Essential hypertension Blade Angulo MD    Office Visit    4 months ago ASCUS of cervix with negative high risk HPV    [facility], [address], [place] - OB/GYN Shabnam Sarmiento MD    Office Visit    4 months ago Other atopic dermatitis    Audrey Martines MD    Office Visit    4 months ago Mild persistent asthma without complication    [facility], [address] [facility], Jovanny Tobar MD    Office Visit    5 months ago Routine health maintenance    [facility], David Rocha MD    Office Visit          Future Appointments         Provider Department Appt Notes    In 3 weeks Fatmata Alanis MD [facility], [address] f/up    In 1 month Meghan Vail MD [facility], [address], [place] BCBS PPO  NON SX F/U                         Recent Outpatient Visits              2 months ago Essential hypertension    [facility], [address], Roberta Newberry MD    Office Visit    4 months ago ASCUS of cervix with negative high risk HPV    [facility], [address], [place] - [facility]GYN Shabnam Sarmiento MD    Office Visit    4 months ago Other atopic dermatitis    Audrey Martines MD    Office Visit    4 months ago Mild persistent asthma without complication    Monet Escobar MD    Office Visit    5 months ago Routine health maintenance    mR Agrawal MD    Office Visit          Future Appointments         Provider Department Appt Notes    In 3 weeks Fatmata Alanis MD [facility], [facility] f/up    In 1 month Meghan Vail MD [employer], [facility], [facility] PPO  NON SX F/U

## 2023-11-30 ENCOUNTER — NURSE ONLY (OUTPATIENT)
Dept: ALLERGY | Facility: CLINIC | Age: 58
End: 2023-11-30

## 2023-11-30 ENCOUNTER — TELEPHONE (OUTPATIENT)
Dept: ALLERGY | Facility: CLINIC | Age: 58
End: 2023-11-30

## 2023-11-30 ENCOUNTER — OFFICE VISIT (OUTPATIENT)
Dept: ALLERGY | Facility: CLINIC | Age: 58
End: 2023-11-30

## 2023-11-30 VITALS
WEIGHT: 190 LBS | SYSTOLIC BLOOD PRESSURE: 125 MMHG | DIASTOLIC BLOOD PRESSURE: 85 MMHG | BODY MASS INDEX: 28.14 KG/M2 | HEART RATE: 76 BPM | HEIGHT: 69 IN | OXYGEN SATURATION: 100 %

## 2023-11-30 DIAGNOSIS — T50.Z95A ADVERSE REACTION TO VACCINE, INITIAL ENCOUNTER: Primary | ICD-10-CM

## 2023-11-30 PROCEDURE — 90686 IIV4 VACC NO PRSV 0.5 ML IM: CPT | Performed by: ALLERGY & IMMUNOLOGY

## 2023-11-30 PROCEDURE — 3079F DIAST BP 80-89 MM HG: CPT | Performed by: ALLERGY & IMMUNOLOGY

## 2023-11-30 PROCEDURE — 90471 IMMUNIZATION ADMIN: CPT | Performed by: ALLERGY & IMMUNOLOGY

## 2023-11-30 PROCEDURE — 3074F SYST BP LT 130 MM HG: CPT | Performed by: ALLERGY & IMMUNOLOGY

## 2023-11-30 PROCEDURE — 99204 OFFICE O/P NEW MOD 45 MIN: CPT | Performed by: ALLERGY & IMMUNOLOGY

## 2023-11-30 PROCEDURE — 3008F BODY MASS INDEX DOCD: CPT | Performed by: ALLERGY & IMMUNOLOGY

## 2023-11-30 PROCEDURE — 95004 PERQ TESTS W/ALRGNC XTRCS: CPT | Performed by: ALLERGY & IMMUNOLOGY

## 2023-11-30 NOTE — PROGRESS NOTES
Greyson Hameed is a 66year old female. HPI:     Chief Complaint   Patient presents with    Allergies     Pt allergic to flu vaccine, in the past pt had severe allergic reaction to flu vaccine, pt advised by new employer that she will need form filled by board certified allergist   Per pt no antihistamines in the last 5 days      Patient is a 82-year-old female who presents for allergy evaluation with concern for adverse reaction to the flu vaccine    Today patient reports      Allergies/adverse reaction to flu vaccine  Last flu vaccine was 5 years ago   Duration: 10 years ago   Timing: intermittent   Symptoms: wheezing  No rashes or hives   Within next day of receiving flu vaccine then a few hrs after receiving flu vaccine in pcp office   No prior flu vaccine at work   Reports needed epipen  Severity: moderate   Status: avoid flu vaccine   Triggers: Flu vaccine  Tried:epipen, albuterol   Pets or smokers:  no pets  Nonsmoker     Hx of asthma, ar  ad, or food allergy:  + ad. Topical prn   No hx of egg allergy      Used to work at [employer], last in 2021          HISTORY:  Past Medical History:   Diagnosis Date    Abnormal mammogram of right breast     Had a biopsy     Anesthesia complication     difficulty in waking up    Asthma     exercised induced    Claustrophobia     Essential hypertension     Helicobacter positive gastritis 09/2017    on abx therapy    High blood pressure     History of blood transfusion     2013 at the time of myomectomy. IBS (irritable bowel syndrome)     constipation    Kidney donor     right    Pulmonary embolism ([facility] [address] 2013    Bilateral PE, symptomatic. Had myomectomy 1-2 months prior for symptomatic fibroids. Negative work up for coagulation disorders. Treated for 1 year.     Tubular adenoma of colon 04/2019    repeat CLN in 5yrs      Past Surgical History:   Procedure Laterality Date    COLONOSCOPY  2011    COLONOSCOPY      EGD  2011    LASIK Bilateral 2010    NEEDLE BIOPSY RIGHT      NEPHRECTOMY Right 1999    NEPHRECTOMY      OTHER  2015    UFE    PRIOR MYOMECTOMY      UPPER GI ENDOSCOPY,EXAM      UTERINE FIBROID EMBOLIZATION PERQ W/RAD GID        Family History   Problem Relation Age of Onset    Cancer Father         prostate    Hypertension Father     Glaucoma Father     Diabetes Mother     Hypertension Mother     Glaucoma Mother     Cancer Sister         cervical    Hypertension Sister     Heart Disorder Sister     Hypertension Brother     Cancer Sister         cervical    Hypertension Sister     Macular degeneration Neg       Social History:   Social History     Socioeconomic History    Marital status: Single    Number of children: 0   Tobacco Use    Smoking status: Never    Smokeless tobacco: Never   Vaping Use    Vaping Use: Never used   Substance and Sexual Activity    Alcohol use: No     Alcohol/week: 0.0 standard drinks of alcohol    Drug use: Never   Other Topics Concern    Pt has a pacemaker No    Pt has a defibrillator No    Reaction to local anesthetic No        Medications (Active prior to today's visit):  Current Outpatient Medications   Medication Sig Dispense Refill    spironolactone 50 MG Oral Tab Take 1 tablet (50 mg total) by mouth daily. 90 tablet 1    Dulaglutide (TRULICITY) 1.5 GT/7.3KY Subcutaneous Solution Pen-injector Inject 1.5 mg into the skin once a week. 6 mL 1    TOPIRAMATE 50 MG Oral Tab TAKE 1 TABLET BY MOUTH TWICE A  tablet 1    predniSONE 10 MG Oral Tab Take 3 tabs (30mg) daily for 3 days, then take 2 tabs (20mg) daily for 3 days, then take 1 tab (10mg) daily for 3 days. 18 tablet 0    albuterol (PROAIR HFA) 108 (90 Base) MCG/ACT Inhalation Aero Soln Inhale 2 puffs into the lungs every 4 (four) hours as needed for Wheezing. 3 each 1    Budesonide-Formoterol Fumarate (SYMBICORT) 80-4.5 MCG/ACT Inhalation Aerosol Inhale 2 puffs into the lungs 2 (two) times daily.  1 each 2    tacrolimus 0.1 % External Ointment Apply 1 Application topically 2 (two) times daily. Tofacitinib Citrate 5 MG Oral Tab Take 5 mg by mouth 2 (two) times daily. Ruxolitinib Phosphate (OPZELURA) 1.5 % External Cream Apply 1 Application topically 2 (two) times a day. 60 g 3    triamcinolone 0.1 % External Cream Apply 1 Application topically 3 (three) times daily. To areas arms and legs as needed for eczema 454 g 2    Betamethasone Dipropionate Aug 0.05 % External Cream Apply to worst areas of eczema as needed bid 50 g 1    levocetirizine 5 MG Oral Tab Take 1 tablet (5 mg total) by mouth every evening. 30 tablet 3    triamcinolone 0.5 % External Cream Use bid as directed 60 g 1    albuterol sulfate (2.5 MG/3ML) 0.083% Inhalation Nebu Soln Take 3 mL (2.5 mg total) by nebulization every 4 (four) hours as needed for Wheezing. 50 ampule 3    OFLOXACIN 0.3 % Otic Solution PLACE 5 DROPS INTO BOTH EARS 2 TIMES DAILY FOR 7 DAYS. (Patient not taking: Reported on 11/30/2023) 5 mL 0       Allergies:   Allergies   Allergen Reactions    Amoxicillin RASH    Bactrim [Sulfamethoxazole W/Trimethoprim] HIVES    Flagyl [Metronidazole] HIVES    Flu Virus Vaccine      Respiratory Problems           ROS:     Allergic/Immuno:  See HPI  Cardiovascular:  Negative for irregular heartbeat/palpitations, chest pain, edema  Constitutional:  Negative night sweats,weight loss, irritability and lethargy  Endocrine:  Negative for cold intolerance, polydipsia and polyphagia  ENMT:  Negative for ear drainage, hearing loss and nasal drainage  Eyes:  Negative for eye discharge and vision loss  Gastrointestinal:  Negative for abdominal pain, diarrhea and vomiting  Genitourinary:  Negative for dysuria and hematuria  Hema/Lymph:  Negative for easy bleeding and easy bruising  Integumentary:  Negative for pruritus and rash  Musculoskeletal:  Negative for joint symptoms  Neurological:  Negative for dizziness, seizures  Psychiatric:  Negative for inappropriate interaction and psychiatric symptoms  Respiratory:  see hpi PHYSICAL EXAM:   Constitutional: responsive, no acute distress noted  Head/Face: NC/Atraumatic  Eyes/Vision: conjunctiva and lids are normal extraocular motion is intact   Ears/Audiometry: tympanic membranes are normal bilaterally hearing is grossly intact  Nose/Mouth/Throat: nose and throat are clear mucous membranes are moist   Neck/Thyroid: neck is supple without adenopathy  Lymphatic: no abnormal cervical, supraclavicular or axillary adenopathy is noted  Respiratory: normal to inspection lungs are clear to auscultation bilaterally normal respiratory effort   Cardiovascular: regular rate and rhythm no murmurs, gallups, or rubs  Abdomen: soft non-tender non-distended  Skin/Hair: no unusual rashes present  Extremities: no edema, cyanosis, or clubbing  Neurological:Oriented to time, place, person & situation       ASSESSMENT/PLAN:   Assessment   Encounter Diagnosis   Name Primary? Adverse reaction to vaccine, initial encounter Yes     Skin testing today with Fluzone flu vaccine was negative. Positive histamine control      #1 adverse reaction to flu vaccine  Patient with prior reported clinical symptoms 10 years ago of adverse reaction to the flu vaccine including symptoms of wheezing after receiving the flu vaccine in her PCPs office. No reported hives or rashes. Patient avoiding the flu vaccine since then. No history of egg allergies. Patient tolerates eggs without issues. See above skin testing to the flu vaccine  If skin testing to the flu vaccine is negative then would recommend administration of the flu vaccine followed by a 30-minute observation to further evaluate  Given her negative skin testing and prolonged duration without having receiving the flu vaccine over the past 10 years, I  reviewed with patient that the neck step to further evaluate would be getting the flu vaccine and observing in office for 30 minutes if she was willing .   Reviewed potential adverse events associated with vaccine administration including adverse reactions as well as allergic reactions. Patient acknowledges risk and provides  consent for flu vaccine administration. Patient was vaccinated with Fluzone flu vaccine and observed in office for 30 minutes without signs or symptoms of an allergic process. Upon discharge patient was reexamined. Lungs clear to auscultation. Heart rate normal.  No rashes or hives. Vital signs were normal including heart rate blood pressure and oxygen   roof of flu vaccine administration was provided for patient for her work including lot number and expiration date        Over 45 spent on care and visit           Orders This Visit:  Orders Placed This Encounter   Procedures    Fluzone Quadrivalent 6mo+ 0.5mL       Meds This Visit:  Requested Prescriptions      No prescriptions requested or ordered in this encounter       Imaging & Referrals:  INFLUENZA VACCINE, QUAD, PRESERVATIVE FREE, 0.5 ML     11/30/2023  Lanny Babinski, MD      If medication samples were provided today, they were provided solely for patient education and training related to self administration of these medications. Teaching, instruction and sample was provided to the patient by myself. Teaching included  a review of potential adverse side effects as well as potential efficacy. Patient's questions were answered in regards to medication administration and dosing and potential side effects.  Teaching was provided via the teach back method

## 2023-11-30 NOTE — PROGRESS NOTES
Pt in office today for consult. Pt skin tested to Fluzone Quadrivalent vaccine and was negative. Pt requested to have flu vaccine today in office. Pt stayed in office for 30 minutes post injection. Flu vaccine given at 1543. At [phone] pt reported feeling like her heart was racing and she had a headache. Vitals were taken. BP: 152/96  HR: 68  SPO2: 100%    Pt denies any difficulties breathing, talking or swallowing. Denies any chest pain or dizziness. No visible hives or rashes present. Pt reports feeling well otherwise. Drinking water without difficulties. Vitals taken again at 1605:  BP: 137/89  HR: 63  SPO2: 100%      After 30 minutes pt reports feeling well and is comfortable with discharging home. Dr. Giana Leary examined pt and reports that she is okay to discharge home.

## 2023-12-07 NOTE — TELEPHONE ENCOUNTER
Action Requested: Summary for Provider     []  Critical Lab, Recommendations Needed  [] Need Additional Advice  []   FYI    [x]   Need Orders  [] Need Medications Sent to Pharmacy  []  Other     SUMMARY: Pt declining OV; asking if LV can just order [id number]
F/u call,stated s/s are still there but not as bad pushing fluids/1 cup cranberry juice daily ,offered Appt but want to wait and See Dr Emily Clark on Monday -Appt made  Pt stated she did not decline OV and was never offered one-stated she wants that statement anupam
I can see her today. I have SDS open.
Noted.  Thank you.
No-Patient/Caregiver offered and refused free interpretation services.

## 2023-12-15 ENCOUNTER — OFFICE VISIT (OUTPATIENT)
Dept: SURGERY | Facility: CLINIC | Age: 58
End: 2023-12-15
Payer: COMMERCIAL

## 2023-12-15 VITALS
WEIGHT: 201 LBS | DIASTOLIC BLOOD PRESSURE: 80 MMHG | BODY MASS INDEX: 29.77 KG/M2 | OXYGEN SATURATION: 99 % | SYSTOLIC BLOOD PRESSURE: 110 MMHG | HEIGHT: 69 IN | HEART RATE: 88 BPM

## 2023-12-15 DIAGNOSIS — Z51.81 ENCOUNTER FOR THERAPEUTIC DRUG MONITORING: ICD-10-CM

## 2023-12-15 DIAGNOSIS — I10 ESSENTIAL HYPERTENSION: Primary | ICD-10-CM

## 2023-12-15 DIAGNOSIS — E88.819 INSULIN RESISTANCE: ICD-10-CM

## 2023-12-15 DIAGNOSIS — E66.3 OVERWEIGHT (BMI 25.0-29.9): ICD-10-CM

## 2023-12-15 PROCEDURE — 3079F DIAST BP 80-89 MM HG: CPT | Performed by: INTERNAL MEDICINE

## 2023-12-15 PROCEDURE — 99214 OFFICE O/P EST MOD 30 MIN: CPT | Performed by: INTERNAL MEDICINE

## 2023-12-15 PROCEDURE — 3074F SYST BP LT 130 MM HG: CPT | Performed by: INTERNAL MEDICINE

## 2023-12-15 PROCEDURE — 3008F BODY MASS INDEX DOCD: CPT | Performed by: INTERNAL MEDICINE

## 2023-12-15 RX ORDER — DULAGLUTIDE 3 MG/.5ML
3 INJECTION, SOLUTION SUBCUTANEOUS WEEKLY
Qty: 2 ML | Refills: 3 | Status: SHIPPED | OUTPATIENT
Start: 2023-12-15 | End: 2023-12-18

## 2023-12-18 RX ORDER — DULAGLUTIDE 3 MG/.5ML
3 INJECTION, SOLUTION SUBCUTANEOUS WEEKLY
Qty: 6 ML | Refills: 1 | Status: SHIPPED | OUTPATIENT
Start: 2023-12-18 | End: 2024-03-17

## 2023-12-21 ENCOUNTER — E-ADVICE (OUTPATIENT)
Dept: DERMATOLOGY | Age: 58
End: 2023-12-21

## 2024-01-02 RX ORDER — BARICITINIB 4 MG/1
TABLET, FILM COATED ORAL
Qty: 30 TABLET | Refills: 10 | Status: SHIPPED | OUTPATIENT
Start: 2024-01-02

## 2024-01-30 ENCOUNTER — TELEPHONE (OUTPATIENT)
Dept: SURGERY | Facility: CLINIC | Age: 59
End: 2024-01-30

## 2024-01-30 DIAGNOSIS — E66.9 OBESITY (BMI 30-39.9): Primary | ICD-10-CM

## 2024-01-30 RX ORDER — TIRZEPATIDE 5 MG/.5ML
5 INJECTION, SOLUTION SUBCUTANEOUS WEEKLY
Qty: 3 ML | Refills: 2 | Status: SHIPPED | OUTPATIENT
Start: 2024-01-30

## 2024-01-31 ENCOUNTER — TELEPHONE (OUTPATIENT)
Facility: CLINIC | Age: 59
End: 2024-01-31

## 2024-01-31 DIAGNOSIS — Z12.11 SCREEN FOR COLON CANCER: Primary | ICD-10-CM

## 2024-01-31 RX ORDER — TOPIRAMATE 50 MG/1
50 TABLET, FILM COATED ORAL 2 TIMES DAILY
Qty: 180 TABLET | Refills: 1 | Status: SHIPPED | OUTPATIENT
Start: 2024-01-31

## 2024-01-31 NOTE — TELEPHONE ENCOUNTER
Dr. Lucio,    You last saw this patient in 3/2023. She is due for 5 year colonoscopy recall.     Please advise on orders if appropriate, thank you.   ==============================================================================================================================  -  -  Final Diagnosis:       A. Cecal polyp:   Tubular adenoma fragments.     B. Sigmoid colon polyp:   Hyperplastic polyp.     C. Rectum, biopsy:  Benign colonic mucosa with prominent intramucosal lymphoid aggregate and focal lamina propria hemorrhage.  No evidence of malignancy.

## 2024-02-02 ENCOUNTER — PATIENT MESSAGE (OUTPATIENT)
Dept: SURGERY | Facility: CLINIC | Age: 59
End: 2024-02-02

## 2024-02-05 NOTE — TELEPHONE ENCOUNTER
GI Staff:     Please schedule: Colonoscopy with MAC     Please send SPLIT dose Golytely bowel prep.     Diagnosis: Screening    Medication adjustments:  Day before procedure, hold: NONE  Day of procedure, hold: NONE  >>HOLD Tirzepatide injection for 1 week    >>>Please inform patient if new medications are started after scheduling procedure they need to call clinic to notify us.

## 2024-02-06 DIAGNOSIS — E66.9 OBESITY (BMI 30-39.9): Primary | ICD-10-CM

## 2024-02-06 RX ORDER — POLYETHYLENE GLYCOL 3350, SODIUM CHLORIDE, SODIUM BICARBONATE, POTASSIUM CHLORIDE 420; 11.2; 5.72; 1.48 G/4L; G/4L; G/4L; G/4L
4000 POWDER, FOR SOLUTION ORAL AS DIRECTED
Qty: 4000 ML | Refills: 0 | Status: SHIPPED | OUTPATIENT
Start: 2024-02-06

## 2024-02-06 NOTE — TELEPHONE ENCOUNTER
Authorized to order bowel prep prescription per criteria from GI bowel prep protocol met.     See Dr. Lucio written orders illustrated under MD's written procedural order below.     Preferred pharmacy ([facility]) to release new rx confirmed with the patient.     No further action required at this time.

## 2024-02-06 NOTE — TELEPHONE ENCOUNTER
Scheduled for:  Colonoscopy [id number]   Provider Name:  Dr. Lucio   Date:  6/11/2024  Location:    [facility]  Sedation:  mac  Time:  9:45 (patient is aware arrival time is 8:45)  Prep:  golytely   Meds/Allergies Reconciled?:  Physician reviewed   Diagnosis with codes:  Colon cancer screening Z12.11   Was patient informed to call insurance with codes (Y/N):  Yes, I confirmed BCBS insurance with the patient.   Referral sent?:  Referral was sent at the time of electronic surgical scheduling.  [facility] or [facility] notified?:  I sent an electronic request to Endo Scheduling and received a confirmation today.      Medication Orders:  This patient verbally confirmed that she is not taking:   Iron, blood thinners, BP meds, and is not diabetic   Not latex allergy, Not PCN allergy and does not have a pacemaker     Misc Orders:  I discussed the prep instructions with the patient which she verbally understood and is aware that I will mychart the instructions today.       Further instructions given by staff:   Patient is aware to hold trizepatide for 7 full days before the procedure

## 2024-02-07 ENCOUNTER — PATIENT MESSAGE (OUTPATIENT)
Dept: SURGERY | Facility: CLINIC | Age: 59
End: 2024-02-07

## 2024-02-07 DIAGNOSIS — E66.9 OBESITY (BMI 30-39.9): ICD-10-CM

## 2024-02-22 ENCOUNTER — E-ADVICE (OUTPATIENT)
Dept: DERMATOLOGY | Age: 59
End: 2024-02-22

## 2024-03-09 ENCOUNTER — OFFICE VISIT (OUTPATIENT)
Dept: DERMATOLOGY | Age: 59
End: 2024-03-09

## 2024-03-09 ENCOUNTER — LAB SERVICES (OUTPATIENT)
Dept: LAB | Age: 59
End: 2024-03-09

## 2024-03-09 DIAGNOSIS — L63.0 ALOPECIA TOTALIS: Primary | ICD-10-CM

## 2024-03-09 DIAGNOSIS — L63.0 ALOPECIA TOTALIS: ICD-10-CM

## 2024-03-09 LAB
ALBUMIN SERPL-MCNC: 3.7 G/DL (ref 3.6–5.1)
ALBUMIN/GLOB SERPL: 1.2 {RATIO} (ref 1–2.4)
ALP SERPL-CCNC: 86 UNITS/L (ref 45–117)
ALT SERPL-CCNC: 12 UNITS/L
ANION GAP SERPL CALC-SCNC: 13 MMOL/L (ref 7–19)
AST SERPL-CCNC: 21 UNITS/L
BASOPHILS # BLD: 0 K/MCL (ref 0–0.3)
BASOPHILS NFR BLD: 1 %
BILIRUB SERPL-MCNC: 0.6 MG/DL (ref 0.2–1)
BUN SERPL-MCNC: 10 MG/DL (ref 6–20)
BUN/CREAT SERPL: 9 (ref 7–25)
CALCIUM SERPL-MCNC: 9.5 MG/DL (ref 8.4–10.2)
CHLORIDE SERPL-SCNC: 109 MMOL/L (ref 97–110)
CHOLEST SERPL-MCNC: 158 MG/DL
CHOLEST/HDLC SERPL: 2.5 {RATIO}
CO2 SERPL-SCNC: 26 MMOL/L (ref 21–32)
CREAT SERPL-MCNC: 1.14 MG/DL (ref 0.51–0.95)
DEPRECATED RDW RBC: 46 FL (ref 39–50)
EGFRCR SERPLBLD CKD-EPI 2021: 56 ML/MIN/{1.73_M2}
EOSINOPHIL # BLD: 0.1 K/MCL (ref 0–0.5)
EOSINOPHIL NFR BLD: 2 %
ERYTHROCYTE [DISTWIDTH] IN BLOOD: 13.2 % (ref 11–15)
FASTING DURATION TIME PATIENT: 12 HOURS (ref 0–999)
GLOBULIN SER-MCNC: 3 G/DL (ref 2–4)
GLUCOSE SERPL-MCNC: 97 MG/DL (ref 70–99)
HCT VFR BLD CALC: 38.9 % (ref 36–46.5)
HDLC SERPL-MCNC: 62 MG/DL
HGB BLD-MCNC: 12.4 G/DL (ref 12–15.5)
IMM GRANULOCYTES # BLD AUTO: 0 K/MCL (ref 0–0.2)
IMM GRANULOCYTES # BLD: 0 %
LDLC SERPL CALC-MCNC: 86 MG/DL
LYMPHOCYTES # BLD: 2.6 K/MCL (ref 1–4)
LYMPHOCYTES NFR BLD: 40 %
MCH RBC QN AUTO: 29.9 PG (ref 26–34)
MCHC RBC AUTO-ENTMCNC: 31.9 G/DL (ref 32–36.5)
MCV RBC AUTO: 93.7 FL (ref 78–100)
MONOCYTES # BLD: 0.4 K/MCL (ref 0.3–0.9)
MONOCYTES NFR BLD: 6 %
NEUTROPHILS # BLD: 3.4 K/MCL (ref 1.8–7.7)
NEUTROPHILS NFR BLD: 51 %
NONHDLC SERPL-MCNC: 96 MG/DL
NRBC BLD MANUAL-RTO: 0 /100 WBC
PLATELET # BLD AUTO: 258 K/MCL (ref 140–450)
POTASSIUM SERPL-SCNC: 4 MMOL/L (ref 3.4–5.1)
PROT SERPL-MCNC: 6.7 G/DL (ref 6.4–8.2)
RBC # BLD: 4.15 MIL/MCL (ref 4–5.2)
SODIUM SERPL-SCNC: 144 MMOL/L (ref 135–145)
TRIGL SERPL-MCNC: 50 MG/DL
WBC # BLD: 6.5 K/MCL (ref 4.2–11)

## 2024-03-09 PROCEDURE — 85025 COMPLETE CBC W/AUTO DIFF WBC: CPT | Performed by: INTERNAL MEDICINE

## 2024-03-09 PROCEDURE — 36415 COLL VENOUS BLD VENIPUNCTURE: CPT | Performed by: DERMATOLOGY

## 2024-03-09 PROCEDURE — 80053 COMPREHEN METABOLIC PANEL: CPT | Performed by: INTERNAL MEDICINE

## 2024-03-09 PROCEDURE — 80061 LIPID PANEL: CPT | Performed by: INTERNAL MEDICINE

## 2024-03-09 RX ORDER — SPIRONOLACTONE 50 MG/1
1 TABLET, FILM COATED ORAL DAILY
COMMUNITY
Start: 2023-11-01

## 2024-03-09 RX ORDER — TOPIRAMATE 50 MG/1
50 TABLET, FILM COATED ORAL 2 TIMES DAILY
COMMUNITY
Start: 2024-01-31

## 2024-03-13 ENCOUNTER — TELEPHONE (OUTPATIENT)
Dept: INTERNAL MEDICINE | Age: 59
End: 2024-03-13

## 2024-04-06 ENCOUNTER — APPOINTMENT (OUTPATIENT)
Dept: DERMATOLOGY | Age: 59
End: 2024-04-06

## 2024-04-09 ENCOUNTER — TELEPHONE (OUTPATIENT)
Dept: FAMILY MEDICINE CLINIC | Facility: CLINIC | Age: 59
End: 2024-04-09

## 2024-04-09 NOTE — TELEPHONE ENCOUNTER
Niurka from [facility] calling to see if we can schedule patient for a Mammogram prior to her upcoming appointment with Dr. Dale scheduled on 6/3/24    Patient is a transfer of care from Dr. Yin to Dr. Dale.    May call Niurka back at [phone] with any questions.   Let her know if the orders are available so she can schedule patient for the mammogram.

## 2024-04-10 NOTE — TELEPHONE ENCOUNTER
Spoke to Niurka and advised that patient can schedule appt with Jennifer Hyman before 06/2024 with Dr Dale to have her mammogram done. She will call patient to giveher that option.

## 2024-04-12 DIAGNOSIS — E66.9 OBESITY (BMI 30-39.9): ICD-10-CM

## 2024-04-15 RX ORDER — SEMAGLUTIDE 1.7 MG/.75ML
0.75 INJECTION, SOLUTION SUBCUTANEOUS WEEKLY
Qty: 3 ML | Refills: 3 | Status: SHIPPED | OUTPATIENT
Start: 2024-04-15 | End: 2024-05-15

## 2024-05-01 ENCOUNTER — TELEPHONE (OUTPATIENT)
Dept: FAMILY MEDICINE CLINIC | Facility: CLINIC | Age: 59
End: 2024-05-01

## 2024-05-01 NOTE — TELEPHONE ENCOUNTER
Patient calling ( name and  of patient verified )  is very annoyed she is getting messages from Dr Ribera to have tests done and make appointments      Patient states Dr Dale is her new PCP  and she already has an upcoming appointment with him      Future Appointments   Date Time Provider Department Center   2024  4:30 PM [facility]ahn [id number]   6/3/2024 10:20 AM [facility]   2024  9:45 AM RAGHU, PROCEDURE [id number] None       Patient has confirmed that [facility]   will be the patient's PCP   PCP removal request order placed per protocol..

## 2024-05-02 NOTE — ED AVS SNAPSHOT
Lucretia Wynne   MRN: [id number]    Department:  [facility] Emergency Department   Date of Visit:  11/20/2018           Disclosure     Insurance plans vary and the physician(s) referred by the ER may not be covered by your plan.  Please cont CARE PHYSICIAN AT ONCE OR RETURN IMMEDIATELY TO THE EMERGENCY DEPARTMENT. If you have been prescribed any medication(s), please fill your prescription right away and begin taking the medication(s) as directed.   If you believe that any of the medications Pt. reports she wants to improve performance with self-care tasks and go home when ready.

## 2024-05-16 ENCOUNTER — OFFICE VISIT (OUTPATIENT)
Dept: SURGERY | Facility: CLINIC | Age: 59
End: 2024-05-16

## 2024-05-16 VITALS
OXYGEN SATURATION: 95 % | HEART RATE: 82 BPM | BODY MASS INDEX: 28.96 KG/M2 | HEIGHT: 69 IN | SYSTOLIC BLOOD PRESSURE: 108 MMHG | DIASTOLIC BLOOD PRESSURE: 66 MMHG | WEIGHT: 195.5 LBS

## 2024-05-16 DIAGNOSIS — I10 ESSENTIAL HYPERTENSION: Primary | ICD-10-CM

## 2024-05-16 DIAGNOSIS — E88.819 INSULIN RESISTANCE: ICD-10-CM

## 2024-05-16 DIAGNOSIS — Z51.81 ENCOUNTER FOR THERAPEUTIC DRUG MONITORING: ICD-10-CM

## 2024-05-16 DIAGNOSIS — E66.3 OVERWEIGHT (BMI 25.0-29.9): ICD-10-CM

## 2024-05-16 RX ORDER — TOPIRAMATE 50 MG/1
50 TABLET, FILM COATED ORAL 2 TIMES DAILY
Qty: 180 TABLET | Refills: 1 | Status: SHIPPED | OUTPATIENT
Start: 2024-05-16

## 2024-05-16 NOTE — PROGRESS NOTES
[facility], [facility], [place]  [address] [facility] [address]  [facility] [address]  Dept: [phone]     Patient:  Karen Damon  :      1965  MRN:      [id number]    Chief Complaint:    Chief Complaint   Patient presents with    Follow - Up    Weight Management     Weight check        SUBJECTIVE     History of Present Illness:  Karen is being seen today for a follow-up for weight management follow up    Past Medical History:   Past Medical History:    Abnormal mammogram of right breast    Had a biopsy     Anesthesia complication    difficulty in waking up    Asthma (HCC)    exercised induced    Claustrophobia    Essential hypertension    Helicobacter positive gastritis    on abx therapy    High blood pressure    History of blood transfusion     at the time of myomectomy.    IBS (irritable bowel syndrome)    constipation    Kidney donor    right    Pulmonary embolism (HCC)    Bilateral PE, symptomatic.  Had myomectomy 1-2 months prior for symptomatic fibroids.  Negative work up for coagulation disorders.  Treated for 1 year.    Tubular adenoma of colon    repeat CLN in 5yrs        Comorbidities:  Other:  none    OBJECTIVE     Vitals: /66   Pulse 82   Ht 5' 9\" (1.753 m)   Wt 195 lb 8 oz (88.7 kg)   SpO2 95%   BMI 28.87 kg/m²     Initial weight loss: -06   Total weight loss: -28   Start weight: 224    Wt Readings from Last 3 Encounters:   24 195 lb 8 oz (88.7 kg)   12/15/23 201 lb (91.2 kg)   23 190 lb (86.2 kg)       Patient Medications:    Current Outpatient Medications   Medication Sig Dispense Refill    semaglutide-weight management 2.4 MG/0.75ML Subcutaneous Solution Auto-injector Inject 0.75 mL (2.4 mg total) into the skin once a week for 4 doses. 3 mL 5    topiramate 50 MG Oral Tab Take 1 tablet (50 mg total) by mouth 2 (two) times daily. 180 tablet 1    PEG 3350-KCl-Na Bicarb-NaCl (TRILYTE) 420 g Oral Recon Soln Take 4,000  mL by mouth As Directed. May substitute prescription with Golytely/Nulytely/Gavilyte and or generic equivalent, if needed. Take bowel preparation as provided by Gastroenterology, or visit our website at [email or web address] 4000 mL 0    spironolactone 50 MG Oral Tab Take 1 tablet (50 mg total) by mouth daily. 90 tablet 1    predniSONE 10 MG Oral Tab Take 3 tabs (30mg) daily for 3 days, then take 2 tabs (20mg) daily for 3 days, then take 1 tab (10mg) daily for 3 days. 18 tablet 0    albuterol (PROAIR HFA) 108 (90 Base) MCG/ACT Inhalation Aero Soln Inhale 2 puffs into the lungs every 4 (four) hours as needed for Wheezing. 3 each 1    Budesonide-Formoterol Fumarate (SYMBICORT) 80-4.5 MCG/ACT Inhalation Aerosol Inhale 2 puffs into the lungs 2 (two) times daily. 1 each 2    OFLOXACIN 0.3 % Otic Solution PLACE 5 DROPS INTO BOTH EARS 2 TIMES DAILY FOR 7 DAYS. (Patient not taking: Reported on 11/30/2023) 5 mL 0    tacrolimus 0.1 % External Ointment Apply 1 Application topically 2 (two) times daily.      Tofacitinib Citrate 5 MG Oral Tab Take 5 mg by mouth 2 (two) times daily.      Ruxolitinib Phosphate (OPZELURA) 1.5 % External Cream Apply 1 Application topically 2 (two) times a day. 60 g 3    triamcinolone 0.1 % External Cream Apply 1 Application topically 3 (three) times daily. To areas arms and legs as needed for eczema 454 g 2    Betamethasone Dipropionate Aug 0.05 % External Cream Apply to worst areas of eczema as needed bid 50 g 1    levocetirizine 5 MG Oral Tab Take 1 tablet (5 mg total) by mouth every evening. 30 tablet 3    triamcinolone 0.5 % External Cream Use bid as directed 60 g 1    albuterol sulfate (2.5 MG/3ML) 0.083% Inhalation Nebu Soln Take 3 mL (2.5 mg total) by nebulization every 4 (four) hours as needed for Wheezing. 50 ampule 3     Allergies:  Amoxicillin, Bactrim [sulfamethoxazole w/trimethoprim], Flagyl [metronidazole], and Flu virus vaccine      Social History:    Social History     Socioeconomic History    Marital status: Single     Spouse name: Not on file    Number of children: 0    Years of education: Not on file    Highest education level: Not on file   Occupational History    Not on file   Tobacco Use    Smoking status: Never    Smokeless tobacco: Never   Vaping Use    Vaping status: Never Used   Substance and Sexual Activity    Alcohol use: No     Alcohol/week: 0.0 standard drinks of alcohol    Drug use: Never    Sexual activity: Not on file   Other Topics Concern    Grew up on a farm Not Asked    History of tanning Not Asked    Outdoor occupation Not Asked    Pt has a pacemaker No    Pt has a defibrillator No    Breast feeding Not Asked    Reaction to local anesthetic No   Social History Narrative    Not on file     Social Determinants of Health     Financial Resource Strain: Not on file   Food Insecurity: Not on file   Transportation Needs: Not on file   Physical Activity: Not on file   Stress: Not on file   Social Connections: Not on file   Housing Stability: Not on file     Surgical History:    Past Surgical History:   Procedure Laterality Date    Colonoscopy  2011    Colonoscopy      Egd  2011    Lasik Bilateral 2010    Needle biopsy right      Nephrectomy Right 1999    Nephrectomy      Other  2015    UFE    Prior myomectomy      Upper gi endoscopy,exam      Uterine fibroid embolization perq w/rad gid       Family History:    Family History   Problem Relation Age of Onset    Cancer Father         prostate    Hypertension Father     Glaucoma Father     Diabetes Mother     Hypertension Mother     Glaucoma Mother     Cancer Sister         cervical    Hypertension Sister     Heart Disorder Sister     Hypertension Brother     Cancer Sister         cervical    Hypertension Sister     Macular degeneration Neg        Food Journal  Reviewed and Discussed:       Patient has a Food Journal?: no   Patient is reading nutrition labels?  yes  Average  Caloric Intake:     Average CHO Intake: <100  Is patient exercising? yes  Type of exercise? Walking    Eating Habits  Patient states the following:  Eats 3 meal(s) per day  Length of time it takes to consume a meal:  15 min  # of snacks per day: 1-2 Type of snacks:  dark chocolate, raisins  Amount of soda consumption per day:  none  Amount of water (in ounces) per day:  adequate  Drinking between meals only:  no  Toughest challenge:  sweets    Nutritional Goals  Limit carbohydrates to 100 gms per day, Eat 100-200 calories within 1 hour of waking  and Eat 3-4 cups of fresh fruits or vegetables daily    Behavior Modifications Reviewed and Discussed  Eat breakfast, Eat 3 meals per day, Plan meals in advance, Read nutrition labels, Drink 64 oz of water per day, Maintain a daily food journal, No drinking 30 minutes before or after meals, Utlize portion control strategies to reduce calorie intake, Identify triggers for eating and manage cues and Eat slowly and take 20 to 30 minutes to complete each meal    Exercise Goals Reviewed and Discussed    Walk for  45 Minutes    ROS:    Review of Systems   Constitutional: Negative.  Negative for activity change, appetite change and fever.   HENT: Negative.     Respiratory: Negative.  Negative for cough, shortness of breath and wheezing.    Cardiovascular: Negative.  Negative for chest pain, palpitations and leg swelling.   Gastrointestinal: Negative.  Negative for abdominal distention, abdominal pain, constipation, diarrhea, nausea and vomiting.   Genitourinary: Negative.  Negative for dysuria, flank pain and frequency.   Musculoskeletal: Negative.  Negative for back pain and gait problem.   Skin: Negative.    Neurological: Negative.  Negative for dizziness and headaches.   Psychiatric/Behavioral: Negative.         Physical Exam:   Physical Exam  Vitals reviewed.   Constitutional:       General: She is not in acute distress.     Appearance: She is well-developed.   HENT:       Head: Normocephalic and atraumatic.   Pulmonary:      Effort: Pulmonary effort is normal. No respiratory distress.   Abdominal:      Palpations: Abdomen is soft.   Musculoskeletal:         General: Normal range of motion.      Cervical back: Neck supple.   Skin:     General: Skin is warm and dry.   Neurological:      Mental Status: She is alert and oriented to person, place, and time.   Psychiatric:         Behavior: Behavior normal.         Thought Content: Thought content normal.         Judgment: Judgment normal.       ASSESSMENT     Encounter Diagnosis(ses):   Encounter Diagnoses   Name Primary?    Essential hypertension Yes    Insulin resistance     Encounter for therapeutic drug monitoring     Overweight (BMI 25.0-29.9)        PLAN   Patient is not interested in bariatric surgery. Patient desires to pursue traditional weight loss at this time.       Asthma: stable       Hx of PE: in 2013 after myomectomy; was on Coumadin x 1 year after PEs     Topiramte has been effective for Emmett and decreasing her sweet tooth.    Goals for next month:  1. Keep a food log using [employer]  2. Drink 48-64 ounces of water per day. No fruit juices or regular soda.  3. Increase aerobic exercises (goal is 150 minutes per week)  4. Increase fruit and vegetable servings/day  5. Keep carbs at or below 100g/day  6. Avoid skipping meals  7. Prepare meals and snacks in advance    Tolerating topiramate refill at 50 mg BID  Kidney donor    PHENTERMINE: not tolerating  Higher dose causing insomnia  discontinued  ekg done    Tolerating Wegovy   Increased appetite noted  Bump up to 2.4 mg      John Hahn MD

## 2024-06-03 ENCOUNTER — OFFICE VISIT (OUTPATIENT)
Dept: FAMILY MEDICINE CLINIC | Facility: CLINIC | Age: 59
End: 2024-06-03
Payer: COMMERCIAL

## 2024-06-03 ENCOUNTER — PATIENT OUTREACH (OUTPATIENT)
Dept: CASE MANAGEMENT | Age: 59
End: 2024-06-03

## 2024-06-03 ENCOUNTER — LAB ENCOUNTER (OUTPATIENT)
Dept: LAB | Age: 59
End: 2024-06-03
Attending: FAMILY MEDICINE
Payer: COMMERCIAL

## 2024-06-03 ENCOUNTER — ORDER TRANSCRIPTION (OUTPATIENT)
Dept: ADMINISTRATIVE | Facility: HOSPITAL | Age: 59
End: 2024-06-03

## 2024-06-03 VITALS
WEIGHT: 190 LBS | SYSTOLIC BLOOD PRESSURE: 107 MMHG | BODY MASS INDEX: 28 KG/M2 | DIASTOLIC BLOOD PRESSURE: 66 MMHG | OXYGEN SATURATION: 97 % | RESPIRATION RATE: 18 BRPM | HEART RATE: 81 BPM | TEMPERATURE: 98 F

## 2024-06-03 DIAGNOSIS — Z00.00 ROUTINE PHYSICAL EXAMINATION: ICD-10-CM

## 2024-06-03 DIAGNOSIS — Z11.1 SCREENING-PULMONARY TB: ICD-10-CM

## 2024-06-03 DIAGNOSIS — Z12.31 ENCOUNTER FOR SCREENING MAMMOGRAM FOR MALIGNANT NEOPLASM OF BREAST: Primary | ICD-10-CM

## 2024-06-03 DIAGNOSIS — Z12.31 ENCOUNTER FOR SCREENING MAMMOGRAM FOR BREAST CANCER: ICD-10-CM

## 2024-06-03 DIAGNOSIS — E66.3 OVERWEIGHT (BMI 25.0-29.9): ICD-10-CM

## 2024-06-03 DIAGNOSIS — Z00.00 ROUTINE PHYSICAL EXAMINATION: Primary | ICD-10-CM

## 2024-06-03 LAB
BILIRUB UR QL: NEGATIVE
CLARITY UR: CLEAR
COLOR UR: YELLOW
GLUCOSE UR-MCNC: NORMAL MG/DL
HGB UR QL STRIP.AUTO: NEGATIVE
KETONES UR-MCNC: NEGATIVE MG/DL
LEUKOCYTE ESTERASE UR QL STRIP.AUTO: 75
NITRITE UR QL STRIP.AUTO: NEGATIVE
PH UR: 6 [PH] (ref 5–8)
PROT UR-MCNC: 20 MG/DL
SP GR UR STRIP: >1.03 (ref 1–1.03)
TSI SER-ACNC: 1.2 MIU/ML (ref 0.55–4.78)
UROBILINOGEN UR STRIP-ACNC: NORMAL

## 2024-06-03 PROCEDURE — 36415 COLL VENOUS BLD VENIPUNCTURE: CPT

## 2024-06-03 PROCEDURE — 3078F DIAST BP <80 MM HG: CPT | Performed by: FAMILY MEDICINE

## 2024-06-03 PROCEDURE — 3074F SYST BP LT 130 MM HG: CPT | Performed by: FAMILY MEDICINE

## 2024-06-03 PROCEDURE — 81001 URINALYSIS AUTO W/SCOPE: CPT

## 2024-06-03 PROCEDURE — 84443 ASSAY THYROID STIM HORMONE: CPT

## 2024-06-03 PROCEDURE — 86480 TB TEST CELL IMMUN MEASURE: CPT

## 2024-06-03 PROCEDURE — 99386 PREV VISIT NEW AGE 40-64: CPT | Performed by: FAMILY MEDICINE

## 2024-06-03 RX ORDER — BARICITINIB 4 MG/1
TABLET, FILM COATED ORAL
COMMUNITY
Start: 2024-05-07

## 2024-06-03 NOTE — PATIENT INSTRUCTIONS
All adult screening ordered and done appropriate for patient's age and gender and risk factors and complaints.  Medication reviewed and renewed where needed and appropriate.  Comply with medications.  Monitor blood pressures and record at home. Limit salt intake.

## 2024-06-03 NOTE — PROCEDURES
Received order requesting to update PCP to Dr. Jeison Dale is Approved and finalized on Amada 3, 2024.    Thanks,  [facility] Team

## 2024-06-03 NOTE — PROGRESS NOTES
Subjective:     Patient ID: Karen Damon is a 58 year old female.    58 year old AA female here for establishing care and complete preventive care physical and for status update on any confirmed chronic medical illnesses and follow up on any previous labs or procedures that were suggestive or in need of further work up. Colonoscopy is set up for this coming week. Bowel, bladder, and sexual functions are intact.    Patient pap is current.     Mammogram order has been ordered.     TSH due and Tb screening offered and received.          History/Other:   Review of Systems  Current Outpatient Medications   Medication Sig Dispense Refill    OLUMIANT 4 MG Oral Tab       semaglutide-weight management 2.4 MG/0.75ML Subcutaneous Solution Auto-injector Inject 0.75 mL (2.4 mg total) into the skin once a week for 4 doses. 3 mL 5    topiramate 50 MG Oral Tab Take 1 tablet (50 mg total) by mouth 2 (two) times daily. 180 tablet 1    PEG 3350-KCl-Na Bicarb-NaCl (TRILYTE) 420 g Oral Recon Soln Take 4,000 mL by mouth As Directed. May substitute prescription with Golytely/Nulytely/Gavilyte and or generic equivalent, if needed. Take bowel preparation as provided by Gastroenterology, or visit our website at [email or web address] 4000 mL 0    spironolactone 50 MG Oral Tab Take 1 tablet (50 mg total) by mouth daily. 90 tablet 1    albuterol (PROAIR HFA) 108 (90 Base) MCG/ACT Inhalation Aero Soln Inhale 2 puffs into the lungs every 4 (four) hours as needed for Wheezing. 3 each 1    Budesonide-Formoterol Fumarate (SYMBICORT) 80-4.5 MCG/ACT Inhalation Aerosol Inhale 2 puffs into the lungs 2 (two) times daily. 1 each 2    OFLOXACIN 0.3 % Otic Solution PLACE 5 DROPS INTO BOTH EARS 2 TIMES DAILY FOR 7 DAYS. (Patient not taking: Reported on 11/30/2023) 5 mL 0    tacrolimus 0.1 % External Ointment Apply 1 Application topically 2 (two) times daily.      Tofacitinib Citrate 5 MG Oral Tab  Take 5 mg by mouth 2 (two) times daily.      Ruxolitinib Phosphate (OPZELURA) 1.5 % External Cream Apply 1 Application topically 2 (two) times a day. 60 g 3    triamcinolone 0.1 % External Cream Apply 1 Application topically 3 (three) times daily. To areas arms and legs as needed for eczema 454 g 2    Betamethasone Dipropionate Aug 0.05 % External Cream Apply to worst areas of eczema as needed bid 50 g 1    levocetirizine 5 MG Oral Tab Take 1 tablet (5 mg total) by mouth every evening. 30 tablet 3    triamcinolone 0.5 % External Cream Use bid as directed 60 g 1    albuterol sulfate (2.5 MG/3ML) 0.083% Inhalation Nebu Soln Take 3 mL (2.5 mg total) by nebulization every 4 (four) hours as needed for Wheezing. 50 ampule 3     Allergies:  Allergies   Allergen Reactions    Amoxicillin RASH    Bactrim [Sulfamethoxazole W/Trimethoprim] HIVES    Flagyl [Metronidazole] HIVES    Flu Virus Vaccine      Respiratory Problems         Past Medical History:    Abnormal mammogram of right breast    Had a biopsy     Anesthesia complication    difficulty in waking up    Asthma (HCC)    exercised induced    Claustrophobia    Essential hypertension    Helicobacter positive gastritis    on abx therapy    High blood pressure    History of blood transfusion    2013 at the time of myomectomy.    IBS (irritable bowel syndrome)    constipation    Kidney donor    right    Pulmonary embolism (HCC)    Bilateral PE, symptomatic.  Had myomectomy 1-2 months prior for symptomatic fibroids.  Negative work up for coagulation disorders.  Treated for 1 year.    Tubular adenoma of colon    repeat CLN in 5yrs      Past Surgical History:   Procedure Laterality Date    Colonoscopy  2011    Colonoscopy      Egd  2011    Lasik Bilateral 2010    Needle biopsy right      Nephrectomy Right 1999    Nephrectomy      Other  2015    UFE    Prior myomectomy      Upper gi endoscopy,exam      Uterine fibroid embolization perq w/rad gid        Family History   Problem  Relation Age of Onset    Cancer Father         prostate    Hypertension Father     Glaucoma Father     Diabetes Mother     Hypertension Mother     Glaucoma Mother     Cancer Sister         cervical    Hypertension Sister     Heart Disorder Sister     Hypertension Brother     Cancer Sister         cervical    Hypertension Sister     Macular degeneration Neg       Social History:   Social History     Socioeconomic History    Marital status: Single    Number of children: 0   Tobacco Use    Smoking status: Never    Smokeless tobacco: Never   Vaping Use    Vaping status: Never Used   Substance and Sexual Activity    Alcohol use: No     Alcohol/week: 0.0 standard drinks of alcohol    Drug use: Never   Other Topics Concern    Pt has a pacemaker No    Pt has a defibrillator No    Reaction to local anesthetic No        Objective:   Vitals:    06/03/24 1021   BP: 107/66   Pulse: 81   Resp: 18   Temp: 98.3 °F (36.8 °C)       Physical Exam  Constitutional:       Appearance: Normal appearance.   HENT:      Head: Normocephalic and atraumatic.      Right Ear: Tympanic membrane normal.      Left Ear: Tympanic membrane normal.      Nose: Nose normal.      Mouth/Throat:      Mouth: Mucous membranes are moist.   Neck:      Thyroid: No thyromegaly.   Cardiovascular:      Rate and Rhythm: Normal rate and regular rhythm.      Heart sounds: Normal heart sounds.      No gallop.   Pulmonary:      Breath sounds: Normal breath sounds.   Abdominal:      Palpations: Abdomen is soft. There is no mass.      Tenderness: There is no abdominal tenderness.   Neurological:      Mental Status: She is alert and oriented to person, place, and time.   Psychiatric:         Mood and Affect: Mood normal.         Assessment & Plan:   1. Routine physical examination  Care has been established.  Patient's current labs reviewed and the following labs have been ordered.  - Urinalysis, Routine [E]; Future  - TSH [E]; Future    2. Encounter for screening mammogram  for breast cancer  Ordered.  - [facility] JANEL 2D+3D SCREENING BILAT (CPT=77067/60332); Future    3. Screening-pulmonary TB  Ordered.  - [facility]ifer[facility] TB Plus; Future    4. Overweight (BMI 25.0-29.9)  Continue with bariatrics recommendations.      No orders of the defined types were placed in this encounter.      Meds This Visit:  Requested Prescriptions      No prescriptions requested or ordered in this encounter       Imaging & Referrals:  [facility] JANEL 2D+3D SCREENING BILAT (CPT=77067/05331)     Patient Instructions   All adult screening ordered and done appropriate for patient's age and gender and risk factors and complaints.  Medication reviewed and renewed where needed and appropriate.  Comply with medications.  Monitor blood pressures and record at home. Limit salt intake.    Return if symptoms worsen or fail to improve.

## 2024-06-04 ENCOUNTER — HOSPITAL ENCOUNTER (OUTPATIENT)
Dept: MAMMOGRAPHY | Age: 59
Discharge: HOME | End: 2024-06-04
Attending: FAMILY MEDICINE
Payer: COMMERCIAL

## 2024-06-04 DIAGNOSIS — Z12.31 ENCOUNTER FOR SCREENING MAMMOGRAM FOR BREAST CANCER: ICD-10-CM

## 2024-06-04 PROCEDURE — 77067 SCR MAMMO BI INCL CAD: CPT | Performed by: FAMILY MEDICINE

## 2024-06-04 PROCEDURE — 77063 BREAST TOMOSYNTHESIS BI: CPT | Performed by: FAMILY MEDICINE

## 2024-06-05 LAB
M TB IFN-G CD4+ T-CELLS BLD-ACNC: 0.05 IU/ML
M TB TUBERC IFN-G BLD QL: NEGATIVE
M TB TUBERC IGNF/MITOGEN IGNF CONTROL: >10 IU/ML
QFT TB1 AG MINUS NIL: 0.01 IU/ML
QFT TB2 AG MINUS NIL: 0.02 IU/ML

## 2024-06-11 ENCOUNTER — HOSPITAL ENCOUNTER (OUTPATIENT)
Facility: HOSPITAL | Age: 59
Setting detail: HOSPITAL OUTPATIENT SURGERY
Discharge: HOME | End: 2024-06-11
Attending: INTERNAL MEDICINE | Admitting: INTERNAL MEDICINE
Payer: COMMERCIAL

## 2024-06-11 ENCOUNTER — ANESTHESIA EVENT (OUTPATIENT)
Dept: ENDOSCOPY | Facility: HOSPITAL | Age: 59
End: 2024-06-11
Payer: COMMERCIAL

## 2024-06-11 ENCOUNTER — APPOINTMENT (OUTPATIENT)
Dept: GENERAL RADIOLOGY | Age: 59
End: 2024-06-11
Attending: NURSE PRACTITIONER
Payer: COMMERCIAL

## 2024-06-11 ENCOUNTER — ANESTHESIA (OUTPATIENT)
Dept: ENDOSCOPY | Facility: HOSPITAL | Age: 59
End: 2024-06-11
Payer: COMMERCIAL

## 2024-06-11 ENCOUNTER — TELEPHONE (OUTPATIENT)
Facility: CLINIC | Age: 59
End: 2024-06-11

## 2024-06-11 ENCOUNTER — HOSPITAL ENCOUNTER (OUTPATIENT)
Age: 59
Discharge: HOME | End: 2024-06-11
Payer: COMMERCIAL

## 2024-06-11 VITALS
HEART RATE: 84 BPM | TEMPERATURE: 99 F | RESPIRATION RATE: 16 BRPM | OXYGEN SATURATION: 100 % | SYSTOLIC BLOOD PRESSURE: 123 MMHG | DIASTOLIC BLOOD PRESSURE: 72 MMHG

## 2024-06-11 DIAGNOSIS — M76.71 PERONEAL TENDINITIS OF RIGHT LOWER EXTREMITY: Primary | ICD-10-CM

## 2024-06-11 DIAGNOSIS — Z12.11 SCREEN FOR COLON CANCER: ICD-10-CM

## 2024-06-11 DIAGNOSIS — M79.673 FOOT PAIN: ICD-10-CM

## 2024-06-11 PROBLEM — K64.8 INTERNAL HEMORRHOIDS: Status: ACTIVE | Noted: 2024-06-11

## 2024-06-11 PROCEDURE — 0DJD8ZZ INSPECTION OF LOWER INTESTINAL TRACT, VIA NATURAL OR ARTIFICIAL OPENING ENDOSCOPIC: ICD-10-PCS | Performed by: INTERNAL MEDICINE

## 2024-06-11 PROCEDURE — 45378 DIAGNOSTIC COLONOSCOPY: CPT | Performed by: INTERNAL MEDICINE

## 2024-06-11 PROCEDURE — 99213 OFFICE O/P EST LOW 20 MIN: CPT | Performed by: NURSE PRACTITIONER

## 2024-06-11 PROCEDURE — A6448 LT COMPRES BAND <3"/YD: HCPCS | Performed by: NURSE PRACTITIONER

## 2024-06-11 PROCEDURE — 73630 X-RAY EXAM OF FOOT: CPT | Performed by: NURSE PRACTITIONER

## 2024-06-11 RX ORDER — SODIUM CHLORIDE, SODIUM LACTATE, POTASSIUM CHLORIDE, CALCIUM CHLORIDE 600; 310; 30; 20 MG/100ML; MG/100ML; MG/100ML; MG/100ML
INJECTION, SOLUTION INTRAVENOUS CONTINUOUS
Status: DISCONTINUED | OUTPATIENT
Start: 2024-06-11 | End: 2024-06-11

## 2024-06-11 RX ORDER — NALOXONE HYDROCHLORIDE 0.4 MG/ML
0.08 INJECTION, SOLUTION INTRAMUSCULAR; INTRAVENOUS; SUBCUTANEOUS ONCE AS NEEDED
Status: DISCONTINUED | OUTPATIENT
Start: 2024-06-11 | End: 2024-06-11

## 2024-06-11 RX ORDER — LIDOCAINE HYDROCHLORIDE 10 MG/ML
INJECTION, SOLUTION EPIDURAL; INFILTRATION; INTRACAUDAL; PERINEURAL AS NEEDED
Status: DISCONTINUED | OUTPATIENT
Start: 2024-06-11 | End: 2024-06-11 | Stop reason: SURG

## 2024-06-11 RX ADMIN — LIDOCAINE HYDROCHLORIDE 50 MG: 10 INJECTION, SOLUTION EPIDURAL; INFILTRATION; INTRACAUDAL; PERINEURAL at 09:43:00

## 2024-06-11 RX ADMIN — SODIUM CHLORIDE, SODIUM LACTATE, POTASSIUM CHLORIDE, CALCIUM CHLORIDE: 600; 310; 30; 20 INJECTION, SOLUTION INTRAVENOUS at 10:02:00

## 2024-06-11 RX ADMIN — SODIUM CHLORIDE, SODIUM LACTATE, POTASSIUM CHLORIDE, CALCIUM CHLORIDE: 600; 310; 30; 20 INJECTION, SOLUTION INTRAVENOUS at 10:05:00

## 2024-06-11 RX ADMIN — SODIUM CHLORIDE, SODIUM LACTATE, POTASSIUM CHLORIDE, CALCIUM CHLORIDE: 600; 310; 30; 20 INJECTION, SOLUTION INTRAVENOUS at 09:41:00

## 2024-06-11 NOTE — ANESTHESIA POSTPROCEDURE EVALUATION
Patient: Karen Damon    Procedure Summary       Date: 06/11/24 Room / Location: [facility] ENDOSCOPY 01 / EM ENDOSCOPY    Anesthesia Start: 0940 Anesthesia Stop:     Procedure: COLONOSCOPY Diagnosis:       Screen for colon cancer      (Hemorrhoids)    Surgeons: JOSEE Lucio MD Anesthesiologist: Shawanda Villarreal CRNA    Anesthesia Type: MAC ASA Status: 2            Anesthesia Type: MAC    Vitals Value Taken Time   /69 06/11/24 1008   Temp 98.2 °F (36.8 °C) 06/11/24 1008   Pulse 88 06/11/24 1008   Resp 14 06/11/24 1008   SpO2 98 % 06/11/24 1008       [facility] AN Post Evaluation:   Patient Evaluated in PACU  Patient Participation: complete - patient participated  Level of Consciousness: sleepy but conscious  Pain Score: 0  Pain Management: adequate  Airway Patency:patent  Dental exam unchanged from preop  Yes    Cardiovascular Status: stable and acceptable  Respiratory Status: acceptable and room air  Postoperative Hydration acceptable      SHAWANDA VILLARREAL CRNA  6/11/2024 10:08 AM

## 2024-06-11 NOTE — ED PROVIDER NOTES
Patient Seen in: [facility]      History     Chief Complaint   Patient presents with    Foot Pain     Stated Complaint: Foot Pain    Subjective: 58-year-old female, past medical history of asthma, hypertension, IBS, tubular adenoma of colon, H. pylori, presents to immediate care for evaluation of nontraumatic left foot pain and swelling.  Patient denies any fall, sprain, heavy weight dropping on foot.  She does report increase in exercise/walking.  No new footwear.  Denies any redness, itching, irritation.  No recent long distance travel in car, plane, train.  No calf pain.  Pain is reproducible with palpation.  She denies any chest pain, dizziness, lightheaded, palpitations, shortness of breath.  She does have a history of PE status post surgery.  Patient is amatory immediate care.  AOx4.  HPI        Objective:   No pertinent past medical history.            No pertinent past surgical history.              No pertinent social history.            Review of Systems   Constitutional: Negative.  Negative for chills, fatigue and fever.   Respiratory: Negative.  Negative for cough, chest tightness and shortness of breath.    Cardiovascular: Negative.  Negative for chest pain, palpitations and leg swelling.   Musculoskeletal:  Positive for arthralgias and joint swelling.   Skin: Negative.  Negative for color change.   Neurological: Negative.  Negative for dizziness, weakness and light-headedness.       Positive for stated complaint: Foot Pain  Other systems are as noted in HPI.  Constitutional and vital signs reviewed.      All other systems reviewed and negative except as noted above.    Physical Exam     ED Triage Vitals [06/11/24 1439]   /72   Pulse 84   Resp 16   Temp 98.8 °F (37.1 °C)   Temp src Temporal   SpO2 100 %   O2 Device None (Room air)       Current Vitals:   Vital Signs  BP: 123/72  Pulse: 84  Resp: 16  Temp: 98.8 °F (37.1 °C)  Temp src: Temporal    Oxygen Therapy  SpO2: 100 %  O2  Device: None (Room air)            Physical Exam  Constitutional:       General: She is not in acute distress.     Appearance: Normal appearance. She is not ill-appearing.   HENT:      Head: Normocephalic.   Cardiovascular:      Rate and Rhythm: Normal rate.      Pulses: Normal pulses.   Pulmonary:      Effort: Pulmonary effort is normal.   Musculoskeletal:         General: Swelling and tenderness present. No deformity. Normal range of motion.      Left lower leg: No swelling, tenderness or bony tenderness. No edema.        Legs:         Feet:    Skin:     General: Skin is warm.      Capillary Refill: Capillary refill takes less than 2 seconds.      Findings: No erythema.   Neurological:      General: No focal deficit present.      Mental Status: She is alert.               ED Course   Labs Reviewed - No data to display  XR FOOT, COMPLETE (MIN 3 VIEWS), LEFT (CPT=73630)    Result Date: 6/11/2024  CONCLUSION:  1. No radiographically visible acute osseous injury of the left foot.  2. Left calcaneal enthesopathy.    Dictated by (CST): Saad Lucio MD on 6/11/2024 at 3:13 PM     Finalized by (CST): Saad Lucio MD on 6/11/2024 at 3:14 PM                          MDM      Differentials considered include: Arthritis flare, gout, cellulitis, peroneal tendinitis.     X-ray obtained, independently reviewed by provider.  No acute fracture or bony abnormality seen.  There is no evidence of arthritis or gout.  There is mild inflammation of the Achilles tendon.  Does correlate with exam.  Negative Jara test on exam, no recent fluoroquinolone use.  No recent trauma or injury.  No suspicion for Achilles tendon injury.    Patient likely suffering for peroneal tendinitis based on physical examination, recent increase in exercise, negative imaging.  Did encourage patient to rest, elevate extremity, apply ice 4 times a day for least 15 minutes.  She is aware to take over-the-counter Exer strength Tylenol.  She has a history  of hypertension and kidney donation, strongly advised against NSAIDs.  Patient is also aware to wear supportive footwear.  However, strongly advised patient to avoid strenuous weightbearing activity, exercise, walking for the next 7 to 10 days.  She is aware she can apply topical analgesics as well.    Did give patient name of podiatrist that she can follow-up with if no improvement of symptoms with the above therapy over the next 10 to 14 days.  Patient had Ace wrap applied.  No neurovascular compromise after Ace wrap application.  Patient is aware of discharge education.  All questions answered time of discharge.                                   Medical Decision Making  Amount and/or Complexity of Data Reviewed  Radiology: ordered and independent interpretation performed. Decision-making details documented in ED Course.        Disposition and Plan     Clinical Impression:  1. Peroneal tendinitis of right lower extremity    2. Foot pain         Disposition:  Discharge  6/11/2024  3:22 pm    Follow-up:  Jeison Dale,   [address] [place] [address]  [facility] [address]  [phone]      As needed    Luis Eduardo Sandoval, DPMAURO  [address]  [place] [address]  [phone]      This is a podiatrist that you can follow-up with if needed if no improvement of symptoms in the next 7 to 10 days., As needed          Medications Prescribed:  Current Discharge Medication List

## 2024-06-11 NOTE — ED INITIAL ASSESSMENT (HPI)
Here for eval of L foot pain that radiates up lateral side of ankle and R LE.  Onset was 4 days ago , worse w/ activity.

## 2024-06-11 NOTE — ANESTHESIA PREPROCEDURE EVALUATION
Anesthesia PreOp Note    HPI:     Karen Damon is a 58 year old female who presents for preoperative consultation requested by: JOSEE Lucio MD    Date of Surgery: 6/11/2024    Procedure(s):  COLONOSCOPY  Indication: Screen for colon cancer    Relevant Problems   No relevant active problems       NPO:  Last Liquid Consumption Date: 06/11/24  Last Liquid Consumption Time: 0330  Last Solid Consumption Date: 06/10/24  Last Solid Consumption Time: 0900  Last Liquid Consumption Date: 06/11/24          History Review:  Patient Active Problem List    Diagnosis Date Noted    Insulin resistance 08/07/2023    Atypical squamous cells of undetermined significance on cytologic smear of cervix (ASC-US) 06/09/2023    Chronic constipation 03/14/2023    Insomnia 09/30/2021    Pain in both lower extremities 03/18/2021    Moderate persistent asthma without complication (HCC) 02/09/2021    Irritable bowel syndrome with constipation 02/04/2020    Endometrial polyp 04/10/2019    Papanicolaou smear of cervix with low grade squamous intraepithelial lesion (LGSIL) 02/20/2019    Overweight (BMI 25.0-29.9) 02/12/2019    Essential hypertension 12/11/2018    Vitamin B12 deficiency 09/18/2018    Obesity (BMI 30-39.9) 03/09/2018    Encounter for therapeutic drug monitoring 03/09/2018    Routine health maintenance 01/25/2018    Recurrent vaginitis 08/09/2017    History of pulmonary embolism 03/20/2017    Kidney donor 02/23/2017       Past Medical History:    Abnormal mammogram of right breast    Had a biopsy     Anesthesia complication    difficulty in waking up    Asthma (HCC)    exercised induced    Claustrophobia    Essential hypertension    Helicobacter positive gastritis    on abx therapy    High blood pressure    History of blood transfusion    2013 at the time of myomectomy.    IBS (irritable bowel syndrome)    constipation    Kidney donor    right    Pulmonary embolism (HCC)    Bilateral PE, symptomatic.  Had myomectomy 1-2  months prior for symptomatic fibroids.  Negative work up for coagulation disorders.  Treated for 1 year.    Tubular adenoma of colon    repeat CLN in 5yrs       Past Surgical History:   Procedure Laterality Date    Colonoscopy  2011    Colonoscopy      Egd  2011    Lasik Bilateral 2010    Needle biopsy right      Nephrectomy Right 1999    Nephrectomy      Other  2015    UFE    Prior myomectomy      Reduction left      Reduction right      Upper gi endoscopy,exam      Uterine fibroid embolization perq w/rad gid         Medications Prior to Admission   Medication Sig Dispense Refill Last Dose    semaglutide-weight management 2.4 MG/0.75ML Subcutaneous Solution Auto-injector Inject 0.75 mL (2.4 mg total) into the skin once a week for 4 doses. 3 mL 5 6/3/2024    topiramate 50 MG Oral Tab Take 1 tablet (50 mg total) by mouth 2 (two) times daily. 180 tablet 1 6/3/2024    semaglutide-weight management (WEGOVY) 1.7 MG/0.75ML Subcutaneous Solution Auto-injector Inject 0.75 mL (1.7 mg total) into the skin once a week. 3 mL 3 6/3/2024    spironolactone 50 MG Oral Tab Take 1 tablet (50 mg total) by mouth daily. 90 tablet 1 6/11/2024    albuterol (PROAIR HFA) 108 (90 Base) MCG/ACT Inhalation Aero Soln Inhale 2 puffs into the lungs every 4 (four) hours as needed for Wheezing. 3 each 1 6/3/2024    Budesonide-Formoterol Fumarate (SYMBICORT) 80-4.5 MCG/ACT Inhalation Aerosol Inhale 2 puffs into the lungs 2 (two) times daily. 1 each 2 6/3/2024    tacrolimus 0.1 % External Ointment Apply 1 Application topically 2 (two) times daily.   6/10/2024    Tofacitinib Citrate 5 MG Oral Tab Take 5 mg by mouth 2 (two) times daily.   6/3/2024    Ruxolitinib Phosphate (OPZELURA) 1.5 % External Cream Apply 1 Application topically 2 (two) times a day. 60 g 3 6/10/2024    triamcinolone 0.1 % External Cream Apply 1 Application topically 3 (three) times daily. To areas arms and legs as needed for eczema 454 g 2 6/10/2024    Betamethasone Dipropionate  Aug 0.05 % External Cream Apply to worst areas of eczema as needed bid 50 g 1 6/10/2024    triamcinolone 0.5 % External Cream Use bid as directed 60 g 1 6/10/2024    albuterol sulfate (2.5 MG/3ML) 0.083% Inhalation Nebu Soln Take 3 mL (2.5 mg total) by nebulization every 4 (four) hours as needed for Wheezing. 50 ampule 3 6/3/2024    OLUMIANT 4 MG Oral Tab    6/10/2024    PEG 3350-KCl-Na Bicarb-NaCl (TRILYTE) 420 g Oral Recon Soln Take 4,000 mL by mouth As Directed. May substitute prescription with Golytely/Nulytely/Gavilyte and or generic equivalent, if needed. Take bowel preparation as provided by Gastroenterology, or visit our website at [email or web address] 4000 mL 0     OFLOXACIN 0.3 % Otic Solution PLACE 5 DROPS INTO BOTH EARS 2 TIMES DAILY FOR 7 DAYS. (Patient not taking: Reported on 11/30/2023) 5 mL 0     levocetirizine 5 MG Oral Tab Take 1 tablet (5 mg total) by mouth every evening. 30 tablet 3 6/3/2024 at prn     Current Facility-Administered Medications Ordered in Epic   Medication Dose Route Frequency Provider Last Rate Last Admin    lactated ringers infusion   Intravenous Continuous JOSEE Lucio MD 20 mL/hr at 06/11/24 0929 New Bag at 06/11/24 0929     No current [facility]-ordered outpatient medications on file.       Allergies   Allergen Reactions    Amoxicillin RASH    Bactrim [Sulfamethoxazole W/Trimethoprim] HIVES    Flagyl [Metronidazole] HIVES    Flu Virus Vaccine      Respiratory Problems         Family History   Problem Relation Age of Onset    Diabetes Mother     Hypertension Mother     Glaucoma Mother     Prostate Cancer Father     Cancer Father         prostate    Hypertension Father     Glaucoma Father     Cancer Sister         cervical    Hypertension Sister     Heart Disorder Sister     Cancer Sister         cervical    Hypertension Sister     Hypertension Brother     Macular degeneration Neg      Social History     Socioeconomic History     Marital status: Single    Number of children: 0   Tobacco Use    Smoking status: Never    Smokeless tobacco: Never   Vaping Use    Vaping status: Never Used   Substance and Sexual Activity    Alcohol use: No     Alcohol/week: 0.0 standard drinks of alcohol    Drug use: Never   Other Topics Concern    Pt has a pacemaker No    Pt has a defibrillator No    Reaction to local anesthetic No       Available pre-op labs reviewed.             Vital Signs:  Body mass index is 28.21 kg/m².   height is 1.753 m (5' 9\") and weight is 86.6 kg (191 lb). Her blood pressure is 135/82 and her pulse is 79. Her respiration is 20 and oxygen saturation is 98%.   Vitals:    05/31/24 1512 06/11/24 0923   BP:  135/82   Pulse:  79   Resp:  20   SpO2:  98%   Weight: 86.6 kg (191 lb) 86.6 kg (191 lb)   Height: 1.753 m (5' 9\") 1.753 m (5' 9\")        Anesthesia Evaluation     Patient summary reviewed and Nursing notes reviewed    History of anesthetic complications (difficulty waking up)   Airway   Mallampati: II  TM distance: >3 FB  Neck ROM: full  Dental - Dentition appears grossly intact     Pulmonary - normal exam    breath sounds clear to auscultation  (+) asthma  Cardiovascular - normal exam  (+) hypertension well controlled    Neuro/Psych      GI/Hepatic/Renal - negative ROS     Endo/Other - negative ROS   Abdominal  - normal exam                 Anesthesia Plan:   ASA:  2  Plan:   MAC  Informed Consent Plan and Risks Discussed With:  Patient  Discussed plan with:  Surgeon      I have informed Karen Tracee Damon and/or legal guardian or family member of the nature of the anesthetic plan, benefits, risks including possible dental damage if relevant, major complications, and any alternative forms of anesthetic management.   All of the patient's questions were answered to the best of my ability. The patient desires the anesthetic management as planned.  CADE VILLARREAL CRNA  6/11/2024 9:33 AM  Present on Admission:  **None**

## 2024-06-11 NOTE — DISCHARGE INSTRUCTIONS
Home Care Instructions for Colonoscopy with Sedation    Diet:  - Resume your regular diet as tolerated unless otherwise instructed.  - Start with light meals to minimize bloating.  - Do not drink alcohol today.    Medication:  - If you have questions about resuming your normal medications, please contact your Primary Care Physician.    Activities:  - Take it easy today. Do not return to work today.  - Do not drive today.  - Do not operate any machinery today (including kitchen equipment).    Colonoscopy:  - You may notice some rectal \"spotting\" (a little blood on the toilet tissue) for a day or two after the exam. This is normal.  - If you experience any rectal bleeding (not spotting), persistent tenderness or sharp severe abdominal pains, oral temperature over 100 degrees Fahrenheit, light-headedness or dizziness, or any other problems, contact your doctor.      **If unable to reach your doctor, please go to the [facility] Emergency Room**    - Your referring physician will receive a full report of your examination.  - If you do not hear from your doctor's office within two weeks of your biopsy, please call them for your results.    You may be able to see your laboratory results in [email or web address] between 4 and 7 business days.  In some cases, your physician may not have viewed the results before they are released to [email or web address].  If you have questions regarding your results contact the physician who ordered the test/exam by phone or via [email or web address] by choosing \"Ask a Medical Question.\"

## 2024-06-11 NOTE — H&P
History & Physical Examination    Patient Name: Karen Damon  MRN: [id number]  [facility]: [id number]  YOB: 1965    Diagnosis: screening for colon cancer    Medications Prior to Admission   Medication Sig Dispense Refill Last Dose    semaglutide-weight management 2.4 MG/0.75ML Subcutaneous Solution Auto-injector Inject 0.75 mL (2.4 mg total) into the skin once a week for 4 doses. 3 mL 5 6/3/2024    topiramate 50 MG Oral Tab Take 1 tablet (50 mg total) by mouth 2 (two) times daily. 180 tablet 1 6/3/2024    semaglutide-weight management (WEGOVY) 1.7 MG/0.75ML Subcutaneous Solution Auto-injector Inject 0.75 mL (1.7 mg total) into the skin once a week. 3 mL 3 6/3/2024    spironolactone 50 MG Oral Tab Take 1 tablet (50 mg total) by mouth daily. 90 tablet 1 6/11/2024    albuterol (PROAIR HFA) 108 (90 Base) MCG/ACT Inhalation Aero Soln Inhale 2 puffs into the lungs every 4 (four) hours as needed for Wheezing. 3 each 1 6/3/2024    Budesonide-Formoterol Fumarate (SYMBICORT) 80-4.5 MCG/ACT Inhalation Aerosol Inhale 2 puffs into the lungs 2 (two) times daily. 1 each 2 6/3/2024    tacrolimus 0.1 % External Ointment Apply 1 Application topically 2 (two) times daily.   6/10/2024    Tofacitinib Citrate 5 MG Oral Tab Take 5 mg by mouth 2 (two) times daily.   6/3/2024    Ruxolitinib Phosphate (OPZELURA) 1.5 % External Cream Apply 1 Application topically 2 (two) times a day. 60 g 3 6/10/2024    triamcinolone 0.1 % External Cream Apply 1 Application topically 3 (three) times daily. To areas arms and legs as needed for eczema 454 g 2 6/10/2024    Betamethasone Dipropionate Aug 0.05 % External Cream Apply to worst areas of eczema as needed bid 50 g 1 6/10/2024    triamcinolone 0.5 % External Cream Use bid as directed 60 g 1 6/10/2024    albuterol sulfate (2.5 MG/3ML) 0.083% Inhalation Nebu Soln Take 3 mL (2.5 mg total) by nebulization every 4 (four) hours as needed for Wheezing. 50 ampule 3 6/3/2024    OLUMIANT 4 MG  Oral Tab    6/10/2024    PEG 3350-KCl-Na Bicarb-NaCl (TRILYTE) 420 g Oral Recon Soln Take 4,000 mL by mouth As Directed. May substitute prescription with Golytely/Nulytely/Gavilyte and or generic equivalent, if needed. Take bowel preparation as provided by Gastroenterology, or visit our website at [email or web address] 4000 mL 0     OFLOXACIN 0.3 % Otic Solution PLACE 5 DROPS INTO BOTH EARS 2 TIMES DAILY FOR 7 DAYS. (Patient not taking: Reported on 11/30/2023) 5 mL 0     levocetirizine 5 MG Oral Tab Take 1 tablet (5 mg total) by mouth every evening. 30 tablet 3 6/3/2024 at prn     Current Facility-Administered Medications   Medication Dose Route Frequency    lactated ringers infusion   Intravenous Continuous     Facility-Administered Medications Ordered in Other Encounters   Medication Dose Route Frequency    lidocaine PF (Xylocaine-MPF) 1% injection   Intravenous PRN    propofol (Diprivan) 10 MG/ML injection   Intravenous PRN       Allergies:   Allergies   Allergen Reactions    Amoxicillin RASH    Bactrim [Sulfamethoxazole W/Trimethoprim] HIVES    Flagyl [Metronidazole] HIVES    Flu Virus Vaccine      Respiratory Problems         Past Medical History:    Abnormal mammogram of right breast    Had a biopsy     Anesthesia complication    difficulty in waking up    Asthma (HCC)    exercised induced    Claustrophobia    Essential hypertension    Helicobacter positive gastritis    on abx therapy    High blood pressure    History of blood transfusion    2013 at the time of myomectomy.    IBS (irritable bowel syndrome)    constipation    Kidney donor    right    Pulmonary embolism (HCC)    Bilateral PE, symptomatic.  Had myomectomy 1-2 months prior for symptomatic fibroids.  Negative work up for coagulation disorders.  Treated for 1 year.    Tubular adenoma of colon    repeat CLN in 5yrs     Past Surgical History:   Procedure Laterality Date    Colonoscopy  2011     Colonoscopy      Egd  2011    Lasik Bilateral 2010    Needle biopsy right      Nephrectomy Right 1999    Nephrectomy      Other  2015    UFE    Prior myomectomy      Reduction left      Reduction right      Upper gi endoscopy,exam      Uterine fibroid embolization perq w/rad gid       Family History   Problem Relation Age of Onset    Diabetes Mother     Hypertension Mother     Glaucoma Mother     Prostate Cancer Father     Cancer Father         prostate    Hypertension Father     Glaucoma Father     Cancer Sister         cervical    Hypertension Sister     Heart Disorder Sister     Cancer Sister         cervical    Hypertension Sister     Hypertension Brother     Macular degeneration Neg      Social History     Tobacco Use    Smoking status: Never    Smokeless tobacco: Never   Substance Use Topics    Alcohol use: No     Alcohol/week: 0.0 standard drinks of alcohol       SYSTEM Check if Review is Normal Check if Physical Exam is Normal If not normal, please explain:   HEENT [X ] [ X]    NECK  [X ] [ X]    HEART [X ] [ X]    LUNGS [X ] [ X]    ABDOMEN [X ] [ X]    EXTREMITIES [X ] [ X]    OTHER        I have discussed the risks and benefits and alternatives of the procedure with the patient/family.  They understand and agree to proceed with plan of care.   I have reviewed the History and Physical done within the last 30 days.  Any changes noted above.    ORVILLE Lucio MD  [facility] - Gastroenterology  6/11/2024  9:43 AM

## 2024-06-11 NOTE — DISCHARGE INSTRUCTIONS
As discussed, x-ray is negative for any acute fracture, arthritis, gout.  There is some inflammation around your Achilles tendon, however, Achilles tendon is intact on exam.  I do believe that you are suffering from tendinitis.  Recommendation is extra strength Tylenol every 4 hours as needed.  RICE therapy: Rest, ice, compression, elevation.  I would rest for the next 7 to 10 days, avoid strenuous exercise, walking etc.  Apply ice and or heat 4 times a day for at least 15 minutes.  You may use topical analgesic creams such as IcyHot, Bengay etc.    If there is no improvement of symptoms in the next 7 to 10 days, recommend that you follow-up with podiatrist.  If there is any worsening swelling, pain, redness, warmth, fever, chills, you may return to immediate care.

## 2024-06-11 NOTE — OPERATIVE REPORT
COLONOSCOPY REPORT    Karen Damon     1965 Age 58 year old   PCP Jeison Dale DO Endoscopist Ashvin Lucio MD     Date of procedure: 24    Procedure: Colonoscopy     Pre-operative diagnosis: Screening, hx of polyps    Post-operative diagnosis: Internal hemorrhoids    Medications: MAC    Withdrawal time: 13 minutes    Procedure:  Informed consent was obtained from the patient after the risks of the procedure were discussed, including but not limited to bleeding, perforation, aspiration, infection, or possibility of a missed lesion. After discussions of the risks/benefits and alternatives to this procedure, as well as the planned sedation, the patient was placed in the left lateral decubitus position and begun on continuous blood pressure pulse oximetry and EKG monitoring and this was maintained throughout the procedure. Once an adequate level of sedation was obtained a digital rectal exam was completed. Then the lubricated tip of the [id number] diagnostic video colonoscope was inserted and advanced without difficulty to the cecum using the CO2 insufflation technique. The cecum was identified by localizing the trifold, the appendix and the ileocecal valve. Withdrawal was begun with thorough washing and careful examination of the colonic walls and folds. A routine second examination of the cecum/ascending colon was performed. Photodocumentation was obtained. The bowel prep was good. Views of the colon were good with washing. I then carefully withdrew the instrument from the patient who tolerated the procedure well.     Complications: none.    Findings:   1. NO polyp(s) noted.    2. Diverticulosis: none.    3. Terminal ileum: the visualized mucosa appeared normal.    4. The colonic mucosa throughout the colon showed normal vascular pattern, without evidence of angioectasias or inflammation.     5. A retroflexed view of the rectum revealed small internal hemorrhoids.    6.  TONYA: normal rectal tone, no masses palpated.     Impression:   Normal colonoscopy to the terminal ileum, other than small internal hemorrhoids.     Recommend:  Repeat CLN in 7 years due to hx of polyps. If new signs or symptoms develop, colonoscopy may need to be repeated sooner.   High fiber diet.    Specimens: none  Blood loss: <1 ml

## 2024-06-11 NOTE — TELEPHONE ENCOUNTER
Health maintenance updated. Last colonoscopy done 06/11/24. 7 year recall placed into [facility], next due on 06/2031 per Dr. Lucio.

## 2024-06-12 VITALS
HEART RATE: 82 BPM | OXYGEN SATURATION: 100 % | SYSTOLIC BLOOD PRESSURE: 105 MMHG | WEIGHT: 191 LBS | DIASTOLIC BLOOD PRESSURE: 69 MMHG | RESPIRATION RATE: 11 BRPM | TEMPERATURE: 98 F | BODY MASS INDEX: 28.29 KG/M2 | HEIGHT: 69 IN

## 2024-06-17 ENCOUNTER — TELEPHONE (OUTPATIENT)
Dept: ORTHOPEDICS CLINIC | Facility: CLINIC | Age: 59
End: 2024-06-17

## 2024-06-17 NOTE — TELEPHONE ENCOUNTER
Per patient was seen on 6/11 for tendinitis, has seen Dr. Lerma before, has appointment on 7/2 but asking if she can be seen sooner, was advised by  to wear brace but brace causes pain. Please call thank you.

## 2024-06-17 NOTE — TELEPHONE ENCOUNTER
Spoke with patient. Endorses pain and swelling to left foot. Has been icing, elevating, wearing a lace up brace per urgent care doctor. Has been rotating tylenol and ibuprofen (only has one kidney) with scant relief. Sleeps with foot elevated and swelling is ok when wakes up but cannot get regular shoe on for work. Endorses she had been walking a lot (7-8 miles a day) prior to onset of pain but denies any actual injury. Had x-rays taken on 6/11/24, in chart.

## 2024-06-17 NOTE — TELEPHONE ENCOUNTER
Spoke with Dr. Newman and he is amenable to seeing patient. Scheduled her for 3:30 pm on 6/18/24. Locations and directions given.

## 2024-06-18 ENCOUNTER — OFFICE VISIT (OUTPATIENT)
Dept: ORTHOPEDICS CLINIC | Facility: CLINIC | Age: 59
End: 2024-06-18

## 2024-06-18 DIAGNOSIS — M84.375A STRESS FRACTURE OF METATARSAL BONE OF LEFT FOOT, INITIAL ENCOUNTER: Primary | ICD-10-CM

## 2024-06-18 PROCEDURE — 99215 OFFICE O/P EST HI 40 MIN: CPT | Performed by: STUDENT IN AN ORGANIZED HEALTH CARE EDUCATION/TRAINING PROGRAM

## 2024-06-19 ENCOUNTER — HOSPITAL ENCOUNTER (OUTPATIENT)
Dept: MRI IMAGING | Facility: HOSPITAL | Age: 59
Discharge: HOME | End: 2024-06-19
Attending: STUDENT IN AN ORGANIZED HEALTH CARE EDUCATION/TRAINING PROGRAM

## 2024-06-19 DIAGNOSIS — M84.375A STRESS FRACTURE OF METATARSAL BONE OF LEFT FOOT, INITIAL ENCOUNTER: ICD-10-CM

## 2024-06-19 PROCEDURE — 73718 MRI LOWER EXTREMITY W/O DYE: CPT | Performed by: STUDENT IN AN ORGANIZED HEALTH CARE EDUCATION/TRAINING PROGRAM

## 2024-06-20 ENCOUNTER — E-ADVICE (OUTPATIENT)
Dept: DERMATOLOGY | Age: 59
End: 2024-06-20

## 2024-06-20 DIAGNOSIS — L63.0 ALOPECIA TOTALIS: Primary | ICD-10-CM

## 2024-06-20 NOTE — PROGRESS NOTES
Orthopaedic Surgery New Patient Visit  _____________________________________________________________________________________________________  _____________________________________________________________________________________________________    DATE OF VISIT: 6/18/2024     CHIEF COMPLAINT:   Chief Complaint   Patient presents with    Foot Pain     Consult Left foot pain 7.5/10 swelling. Onset 2 weeks ago, no injury but walk for 100 miles in about 10 days. has XR in [facility]        HISTORY OF PRESENT ILLNESS: Karen Damon is a 58 year old female who presents to the clinic for left foot pain rated as 7 and half out of 10 with swelling.  She reports that this pain onset approximately 2 weeks ago.  It is relatively severe and made worse with walking.  She reports this happened shortly after completing a challenge where she was walking 10 miles a day for a course of about 10 days.  Pain is isolated in the forefoot, worst about the second and third toes in the metatarsal region.  She denies any traumatic injuries.  She denies any neurologic symptoms.  She has not recently changed her footwear or use any particular tight footwear.  Pain is relatively mild at the beginning of the day when she first gets out of bed but increases in severity as she walks.  She takes a daily vitamin D and she denies any other sites of pain or injury    SOCIAL HISTORY  Social History     Socioeconomic History    Marital status: Single     Spouse name: Not on file    Number of children: 0    Years of education: Not on file    Highest education level: Not on file   Occupational History    Not on file   Tobacco Use    Smoking status: Never    Smokeless tobacco: Never   Vaping Use    Vaping status: Never Used   Substance and Sexual Activity    Alcohol use: No     Alcohol/week: 0.0 standard drinks of alcohol    Drug use: Never    Sexual activity: Not on file   Other Topics Concern    Grew up on a farm Not Asked    History of tanning Not  Asked    Outdoor occupation Not Asked    Pt has a pacemaker No    Pt has a defibrillator No    Breast feeding Not Asked    Reaction to local anesthetic No   Social History Narrative    Not on file     Social Determinants of Health     Financial Resource Strain: Not on file   Food Insecurity: Not on file   Transportation Needs: Not on file   Physical Activity: Not on file   Stress: Not on file   Social Connections: Not on file   Housing Stability: Not on file        PAST MEDICAL HISTORY  Past Medical History:    Abnormal mammogram of right breast    Had a biopsy     Anesthesia complication    difficulty in waking up    Asthma (HCC)    exercised induced    Claustrophobia    Essential hypertension    Helicobacter positive gastritis    on abx therapy    High blood pressure    History of blood transfusion    2013 at the time of myomectomy.    IBS (irritable bowel syndrome)    constipation    Kidney donor    right    Pulmonary embolism (HCC)    Bilateral PE, symptomatic.  Had myomectomy 1-2 months prior for symptomatic fibroids.  Negative work up for coagulation disorders.  Treated for 1 year.    Tubular adenoma of colon    None in 2024, repeat CLN in 2031        PAST SURGICAL HISTORY  Past Surgical History:   Procedure Laterality Date    Colonoscopy  2011    Colonoscopy      Colonoscopy N/A 6/11/2024    Procedure: COLONOSCOPY;  Surgeon: JOSEE Lucio MD;  Location: [facility] ENDOSCOPY    Egd  2011    Lasik Bilateral 2010    Needle biopsy right      Nephrectomy Right 1999    Nephrectomy      Other  2015    UFE    Prior myomectomy      Reduction left      Reduction right      Upper gi endoscopy,exam      Uterine fibroid embolization perq w/rad gid          MEDICATIONS  * Reviewed   OLUMIANT 4 MG Oral Tab       topiramate 50 MG Oral Tab Take 1 tablet (50 mg total) by mouth 2 (two) times daily. 180 tablet 1    spironolactone 50 MG Oral Tab Take 1 tablet (50 mg total) by mouth daily. 90 tablet 1    albuterol (PROAIR HFA) 108 (90  Base) MCG/ACT Inhalation Aero Soln Inhale 2 puffs into the lungs every 4 (four) hours as needed for Wheezing. 3 each 1    Budesonide-Formoterol Fumarate (SYMBICORT) 80-4.5 MCG/ACT Inhalation Aerosol Inhale 2 puffs into the lungs 2 (two) times daily. 1 each 2    OFLOXACIN 0.3 % Otic Solution PLACE 5 DROPS INTO BOTH EARS 2 TIMES DAILY FOR 7 DAYS. 5 mL 0    tacrolimus 0.1 % External Ointment Apply 1 Application topically 2 (two) times daily.      Tofacitinib Citrate 5 MG Oral Tab Take 5 mg by mouth 2 (two) times daily.      Ruxolitinib Phosphate (OPZELURA) 1.5 % External Cream Apply 1 Application topically 2 (two) times a day. 60 g 3    triamcinolone 0.1 % External Cream Apply 1 Application topically 3 (three) times daily. To areas arms and legs as needed for eczema 454 g 2    Betamethasone Dipropionate Aug 0.05 % External Cream Apply to worst areas of eczema as needed bid 50 g 1    levocetirizine 5 MG Oral Tab Take 1 tablet (5 mg total) by mouth every evening. 30 tablet 3    triamcinolone 0.5 % External Cream Use bid as directed 60 g 1    albuterol sulfate (2.5 MG/3ML) 0.083% Inhalation Nebu Soln Take 3 mL (2.5 mg total) by nebulization every 4 (four) hours as needed for Wheezing. 50 ampule 3        ALLERGIES  Allergies   Allergen Reactions    Amoxicillin RASH    Bactrim [Sulfamethoxazole W/Trimethoprim] HIVES    Flagyl [Metronidazole] HIVES    Flu Virus Vaccine      Respiratory Problems          FAMILY HISTORY  Family History   Problem Relation Age of Onset    Diabetes Mother     Hypertension Mother     Glaucoma Mother     Prostate Cancer Father     Cancer Father         prostate    Hypertension Father     Glaucoma Father     Cancer Sister         cervical    Hypertension Sister     Heart Disorder Sister     Cancer Sister         cervical    Hypertension Sister     Hypertension Brother     Macular degeneration Neg         REVIEW OF SYSTEMS  A 14 point review of systems was performed. Pertinent positives and negatives  noted in the HPI.    PHYSICAL EXAM  LMP 03/01/2021 (Exact Date)      Constitutional: The patient is well-developed, well-nourished, in no acute distress.  Neurological: Alert and oriented to person, place, and time.  Psychiatric: Mood and affect normal.  Head: Normocephalic and atraumatic.  Cardiovascular: regular rate by palpation  Pulmonary/Chest: Effort normal. No respiratory distress. Breathing non-labored  Abdominal: Abdomen exhibits no distension.   Left  FOOT/ANKLE  INSPECTION/PALPATION  No readily apparent visual abnormalities of the foot or ankle.   No ecchymosis, erythema, or effusion.   No breaks in skin. Skin is euthermic.  Neutral alignment on standing, normal gait  Neutral foot arch  TTP over metatarsals, primarily second followed by third and fourth  ROM  Dorsiflexion: 20 degrees  Plantarflexion: 45 degrees  Inversion: 20 degrees  Eversion: 20 degrees  MOTOR/STRENGTH  Dorsiflexion: 5/5  Plantarflexion: 5/5  Inversion: 5/5  Eversion: 5/5  Fires EHL/FHL, able to move all toes actively  STABILITY  Stable anterior drawer, eversion stress  Negative syndesmotic squeeze, external rotation stress  POSITIVE  Pain with metatarsal squeeze test  NEGATIVE  Peroneal tendon subluxation  SILT [place]ha/Saph/SPN/DPN/T  2+ DP/PT pulse, brisk capillary refill, foot warm & well perfused     RESULTS    Lab Results   Component Value Date    WBC 9.1 12/23/2022    HGB 12.6 12/23/2022    .0 12/23/2022      Lab Results   Component Value Date     (H) 12/23/2022    BUN 14 12/23/2022    CREATSERUM 1.32 (H) 12/23/2022    GFRNAA 57 (L) 07/26/2022    GFRAA 66 07/26/2022        IMAGING  I independently viewed and interpreted the imaging. Radiologist interpretation is available in the imaging report.  X-ray: Plain films of the left foot demonstrate no acute osseous abnormalities.  No notable degenerative changes.  MRI: MRI of the left foot, which was obtained after our visit demonstrates no evidence of stress fracture.  There  is bursitis in the intermetatarsal region and there are mild degenerative changes of several of the toes and the forefoot    MRI FOOT, LEFT (CPT=73718)    Result Date: 6/19/2024  PROCEDURE: MRI FOOT, LEFT (CPT=73718)  COMPARISON: [facility], XR FOOT, COMPLETE (MIN 3 VIEWS), LEFT (CPT=73630), 6/11/2024, 2:54 PM.  INDICATIONS: M84.375A Stress fracture of metatarsal bone of left foot, initial encounter  TECHNIQUE: A complete multi-planar examination was performed without contrast.  FINDINGS:  BONES: No acute fracture, dislocation, or marrow replacing lesion. JOINTS: Mild joint space narrowing with small osteophytes at the first MTP joint. Degenerative changes are seen throughout the PIP and DIP joints. The joint spaces are otherwise maintained. PLANTAR FASCIA: Visualized portions are intact EFFUSIONS: There is increased fluid seen within the 1st, 2nd and 3rd intermetatarsal bursa (in between the 1st and 2nd, 2nd and 3rd, and 3rd and 4th metatarsal heads respectively). TENDONS: There is focal increased fluid within the tendon sheaths of the flexor hallucis longus and flexor digitorum longus at the master knot of Castro consistent with tenosynovitis. No full thickness tear. OTHER: Diffuse soft tissue edema within the dorsal foot.  No mass or loculated collection. The muscles have a normal signal intensity and bulk.         CONCLUSION:   Mild forefoot osteoarthritis without stress fracture.  First, 2nd and 3rd intermetatarsal bursitis.  Diffuse soft tissue edema within the dorsal foot without abscess.  Mild tenosynovitis at the master knot of Castro.     Dictated by (CST): Rich Landaverde MD on 6/19/2024 at 1:55 PM     Finalized by (CST): Rich Landaverde MD on 6/19/2024 at 2:06 PM          XR FOOT, COMPLETE (MIN 3 VIEWS), LEFT (CPT=73630)    Result Date: 6/11/2024  PROCEDURE: XR FOOT, COMPLETE (MIN 3 VIEWS), LEFT (CPT=73630)  COMPARISON: None available.  INDICATIONS: Pain over the left metatarsals  and instep region over the preceding 5 days. No known trauma.  TECHNIQUE: 3 views were obtained.   FINDINGS:  BONES: No acute fracture or dislocation is evident. No suspicious osseous lesions are seen. The joint spaces are preserved without evidence of significant arthropathy. Calcaneal enthesopathy is demonstrated at the insertion of the Achilles tendon and at the origin of the plantar aponeurosis. SOFT TISSUES: Negative for visible soft tissue swelling or radiopaque foreign body.  EFFUSION: None visible.           CONCLUSION:  1. No radiographically visible acute osseous injury of the left foot.  2. Left calcaneal enthesopathy.    Dictated by (CST): Saad Lucio MD on 6/11/2024 at 3:13 PM     Finalized by (CST): Saad Lucio MD on 6/11/2024 at 3:14 PM          [facility] JANEL 2D+3D SCREENING BILAT (CPT=77067/75057)    Result Date: 6/5/2024  PROCEDURE: [facility] JANEL 2D+3D SCREENING BILAT ([id number])  COMPARISON: [facility] JANEL 2D+3D SCREENING BILAT ([id number]), 2/15/2021, 3:11 PM.  [facility], [facility] JANEL 2D+3D SCREENING BILAT ([id number]), 4/21/2022, 7:23 AM.  [facility], [facility] JANEL 2D+3D SCREENING BILAT ([id number]), 4/22/2023, 11:33 AM.  [facility] JANEL 2D+3D DIAGNOSTIC ADDL VWS  RIGHT (JXR=[id number]), 4/26/2023, 8:39 AM.  [facility] BIOPSY STEREO W/JANEL  GUIDE 1 SITE RIGHT (CPT=19081), 4/28/2023, 8:29 AM.  INDICATIONS: Z12.31 Encounter for screening mammogram for breast cancer  TECHNIQUE:  Full field direct screening mammography was performed and images were reviewed with the [employer] DEMARIO 1.5.1.5 CAD device.  3D tomosynthesis was performed and reviewed   BREAST COMPOSITION:   Category c-Heterogeneously dense, which may obscure small masses.   FINDINGS: There is a biopsy clip in the right breast.  There are stable benign calcifications in the bilateral breasts.  The parenchyma  pattern is stable with no new suspicious asymmetry, mass, architectural distortion, or microcalcifications identified in either breast.          CONCLUSION:   Benign findings.  No mammographic evidence for breast malignancy.  As long as the patient's clinical breast exam remains unchanged, annual screening mammogram is recommended.  BI-RADS CATEGORY:   DIAGNOSTIC CATEGORY 2--BENIGN FINDING:   RECOMMENDATIONS:  ROUTINE MAMMOGRAM AND CLINICAL EVALUATION IN 12 MONTHS.       PLEASE NOTE: NORMAL MAMMOGRAM DOES NOT EXCLUDE THE POSSIBILITY OF BREAST CANCER.  A CLINICALLY SUSPICIOUS PALPABLE LUMP SHOULD BE BIOPSIED.   For patients over the age of 40, the target due date for the patient's next mammogram has been entered into a reminder system.   Patient received a discharge summary from the technologist after completion of exam.  Breast marker legend used on images  Triangle = Palpable lump [place] = Skin tag or mole BB = Nipple Linear zoila = [place] = Pain    Dictated by (CST): Will Akhtar MD on 6/05/2024 at 11:00 AM     Finalized by (CST): Will Akhtar MD on 6/05/2024 at 11:05 AM             ASSESSMENT/PLAN: Karen Damon is a 58 year old female who presents to the Orthopaedic surgery clinic today for left foot pain in the setting of recent activity challenge.  Her symptoms and examination were concerning for impending stress fracture given her substantial increased activity.  However at the time of writing this note her MRI that was ordered at the visit has been completed and demonstrates that her pain appears to be secondary to bursitis in the intermetatarsal region.  This will likely resolve with anti-inflammatories, a period of rest, and possible therapy/injections.  For now, we will plan to provide her with a hard sole shoe to decrease stress on the metatarsals and have her continue taking anti-inflammatory medications.  We will plan to follow-up in approximately 4 to 6 weeks should pain persist after  a trial of rest    Discussed the history, physical exam, treatment to date, and reviewed relevant imaging an studies with the patient.  WEIGHT BEARING STATUS: Nonweightbearing whenever possible, otherwise protected weightbearing in [place] shoe over the next 2 to 4 weeks  RANGE-OF-MOTION LIMITATIONS: as tolerated  NEW PRESCRIPTIONS:  We discussed medications for this condition including patient current regimen. Based on this discussion we have added/re-ordered no additional medications  IMAGING ORDERED: MRI left foot, which has been now completed  CONSULTS PLACED: We discussed the role of therapy for this condition including previous/ongoing therapy and specialist services. Based on this discussion we have opted not to place a consultation to other specialists/therapy  PROSTHESES/ORTHOTICS: based on the condition and patient's function/needs, we have ordered hard soled walking shoe  PROCEDURES: none    FOLLOW-UP:  4-6 weeks should pain persist in a substantial way    RADIOGRAPHS AT NEXT VISIT: none    I have personally seen Karen Damon and discussed in detail their plan of care. Prior to departure, they indicated agreement with and understanding of their plan of care and their follow-up as documented herein this note. Please note that this note was written in combination with voice recognition/dictation software and there is a possibility of transcription errors which were not identified at the time of note submission. If clarification is necessary, please contact the author or clinic staff.    Gaurav Newman MD  Orthopaedic Surgery  6/19/2024      Increased Time Necessity for Clinic Visit    Additional review of medical information, examination, review and discussion of imaging, performance of procedures, and decision making resulted in a substantially increased time for this clinic visit and associated aftercare including review of MRI.  In total, 60 minutes were spent in review, examination,  counseling, performing any necessary procedures, and documenting.

## 2024-06-21 ENCOUNTER — TELEPHONE (OUTPATIENT)
Dept: ORTHOPEDICS CLINIC | Facility: CLINIC | Age: 59
End: 2024-06-21

## 2024-06-21 ENCOUNTER — APPOINTMENT (OUTPATIENT)
Dept: ULTRASOUND IMAGING | Facility: HOSPITAL | Age: 59
End: 2024-06-21
Attending: EMERGENCY MEDICINE

## 2024-06-21 ENCOUNTER — HOSPITAL ENCOUNTER (EMERGENCY)
Facility: HOSPITAL | Age: 59
Discharge: HOME | End: 2024-06-21
Attending: EMERGENCY MEDICINE

## 2024-06-21 VITALS
TEMPERATURE: 98 F | BODY MASS INDEX: 26.81 KG/M2 | RESPIRATION RATE: 17 BRPM | OXYGEN SATURATION: 99 % | HEIGHT: 69 IN | DIASTOLIC BLOOD PRESSURE: 96 MMHG | SYSTOLIC BLOOD PRESSURE: 126 MMHG | WEIGHT: 181 LBS | HEART RATE: 75 BPM

## 2024-06-21 DIAGNOSIS — M79.652 LEFT THIGH PAIN: Primary | ICD-10-CM

## 2024-06-21 DIAGNOSIS — R79.89 ELEVATED SERUM CREATININE: ICD-10-CM

## 2024-06-21 LAB
ANION GAP SERPL CALC-SCNC: 6 MMOL/L (ref 0–18)
BASOPHILS # BLD AUTO: 0.02 X10(3) UL (ref 0–0.2)
BASOPHILS NFR BLD AUTO: 0.3 %
BUN BLD-MCNC: 15 MG/DL (ref 9–23)
BUN/CREAT SERPL: 10 (ref 10–20)
CALCIUM BLD-MCNC: 9.2 MG/DL (ref 8.7–10.4)
CHLORIDE SERPL-SCNC: 113 MMOL/L (ref 98–112)
CO2 SERPL-SCNC: 26 MMOL/L (ref 21–32)
CREAT BLD-MCNC: 1.5 MG/DL
D DIMER PPP FEU-MCNC: 0.36 UG/ML FEU (ref ?–0.56)
DEPRECATED RDW RBC AUTO: 45.1 FL (ref 35.1–46.3)
EGFRCR SERPLBLD CKD-EPI 2021: 41 ML/MIN/1.73M2 (ref 60–?)
EOSINOPHIL # BLD AUTO: 0.07 X10(3) UL (ref 0–0.7)
EOSINOPHIL NFR BLD AUTO: 1 %
ERYTHROCYTE [DISTWIDTH] IN BLOOD BY AUTOMATED COUNT: 13.4 % (ref 11–15)
GLUCOSE BLD-MCNC: 91 MG/DL (ref 70–99)
HCT VFR BLD AUTO: 36.7 %
HGB BLD-MCNC: 12.3 G/DL
IMM GRANULOCYTES # BLD AUTO: 0.03 X10(3) UL (ref 0–1)
IMM GRANULOCYTES NFR BLD: 0.4 %
LYMPHOCYTES # BLD AUTO: 2.48 X10(3) UL (ref 1–4)
LYMPHOCYTES NFR BLD AUTO: 36.9 %
MCH RBC QN AUTO: 30.5 PG (ref 26–34)
MCHC RBC AUTO-ENTMCNC: 33.5 G/DL (ref 31–37)
MCV RBC AUTO: 91.1 FL
MONOCYTES # BLD AUTO: 0.47 X10(3) UL (ref 0.1–1)
MONOCYTES NFR BLD AUTO: 7 %
NEUTROPHILS # BLD AUTO: 3.65 X10 (3) UL (ref 1.5–7.7)
NEUTROPHILS # BLD AUTO: 3.65 X10(3) UL (ref 1.5–7.7)
NEUTROPHILS NFR BLD AUTO: 54.4 %
OSMOLALITY SERPL CALC.SUM OF ELEC: 300 MOSM/KG (ref 275–295)
PLATELET # BLD AUTO: 318 10(3)UL (ref 150–450)
POTASSIUM SERPL-SCNC: 4.2 MMOL/L (ref 3.5–5.1)
RBC # BLD AUTO: 4.03 X10(6)UL
SODIUM SERPL-SCNC: 145 MMOL/L (ref 136–145)
TROPONIN I SERPL HS-MCNC: <3 NG/L
WBC # BLD AUTO: 6.7 X10(3) UL (ref 4–11)

## 2024-06-21 PROCEDURE — 84484 ASSAY OF TROPONIN QUANT: CPT | Performed by: EMERGENCY MEDICINE

## 2024-06-21 PROCEDURE — 36415 COLL VENOUS BLD VENIPUNCTURE: CPT

## 2024-06-21 PROCEDURE — 93971 EXTREMITY STUDY: CPT | Performed by: EMERGENCY MEDICINE

## 2024-06-21 PROCEDURE — 99284 EMERGENCY DEPT VISIT MOD MDM: CPT

## 2024-06-21 PROCEDURE — 85025 COMPLETE CBC W/AUTO DIFF WBC: CPT | Performed by: EMERGENCY MEDICINE

## 2024-06-21 PROCEDURE — 85379 FIBRIN DEGRADATION QUANT: CPT | Performed by: EMERGENCY MEDICINE

## 2024-06-21 PROCEDURE — 80048 BASIC METABOLIC PNL TOTAL CA: CPT | Performed by: EMERGENCY MEDICINE

## 2024-06-21 RX ORDER — LINACLOTIDE 290 UG/1
290 CAPSULE, GELATIN COATED ORAL
Qty: 90 CAPSULE | Refills: 3 | Status: SHIPPED | OUTPATIENT
Start: 2024-06-21

## 2024-06-21 RX ORDER — RITLECITINIB 50 MG/1
50 CAPSULE ORAL DAILY
Qty: 30 CAPSULE | Refills: 2 | Status: SHIPPED | OUTPATIENT
Start: 2024-06-21

## 2024-06-21 NOTE — TELEPHONE ENCOUNTER
Patient states they are having pain in their left foot however the pain is now going up to their leg and would like to know if that is ok. Please advise

## 2024-06-21 NOTE — TELEPHONE ENCOUNTER
Requested Prescriptions     Pending Prescriptions Disp Refills    LINZESS 290 MCG Oral Cap [Pharmacy Med Name: LINZESS 290 MCG CAPSULE] 90 capsule 3     Sig: TAKE 1 CAPSULE (290 MCG TOTAL) BY MOUTH EVERY MORNING BEFORE BREAKFAST     LOV 3/09/2023  LR 3/09/2023  Last colonoscopy 6/11/2024

## 2024-06-21 NOTE — TELEPHONE ENCOUNTER
Spoke with patient. States she is in a boot for foot fracture and has developed pain that is going up her leg into her thigh. Upon assessment, she indicated that she was having sharp pain in her back. Denied shortness of breath. Upon further assessment patient has a history of bilateral PE. I advised her to go to the ED ASAP and to keep us posted with her condition. Verbalized understanding and stated she was heading to ED right away.

## 2024-06-21 NOTE — ED INITIAL ASSESSMENT (HPI)
Patient reports left foot pain with radiation up left leg. Patient reports dx with stress fracture to foot, but came to ER due to radiation of pain up leg. Patient reports right upper back pain also, states \"the last time I felt this pain I had a PE\". Patient reported she has walked \"100 miles within 12 days\".

## 2024-06-22 NOTE — ED QUICK NOTES
Patient safe to be discharged home per provider. Discharge education given via printed AVS. Reviewed: follow up care and labs, medications and when to seek emergency care. Patient verbalizes understanding and is able to teach back. Patient ambulated to exit.

## 2024-06-22 NOTE — DISCHARGE INSTRUCTIONS
Thank you for seeking care at [facility] Emergency Department.    You have been seen and evaluated.  Your ultrasound did not show a blood clot and the screening lab for blood clots was normal.  Your labs incidentally showed an elevation in your kidney function called the creatinine.  Please follow-up with your doctor for repeat test to ensure this is improved.  In the meantime, please push fluids as dehydration can cause this level to be abnormal.     We discussed the results of your workup   Please read the instructions provided   If given prescriptions, take as instructed    Remember, your care process does not end after your visit today. Please follow-up with your doctor within 1-2 days for a follow-up check to ensure you are  improving, to see if you need any further evaluation/testing, or to evaluate for any alternate diagnoses.     Please return to the emergency department if you develop chest pain, difficulty breathing, inability to drink liquids without vomiting, one sided numbness or weakness, slurred speech, severe headache, or if you develop any other new or concerning symptoms as these could be signs of more serious medical illness.    We hope you feel better.

## 2024-06-22 NOTE — ED PROVIDER NOTES
[facility] Emergency Department Note  Patient: Karen Damon Age: 58 year old Sex: female      MRN: [id number]  : 1965    Patient Seen in: [facility] Emergency Department    History     Chief Complaint   Patient presents with    Leg Pain     Stated Complaint: left foot/leg pain, back pain    History obtained from: patient     58F hx of provoked saddle PE in setting of surgery not currently on anticoagulation, HTN, asthma, s/p nephrectomy for kidney donation, presents to the ED with complaints of R upper rib pain as well as L leg pain.  Patient states that she had initially been seen in urgent care approximately 2 weeks ago due to pain and swelling in her left foot.  At that time she had x-rays that did not show any acute fracture with mild inflammation of the Achilles tendon seen.  She was given Ace wrap and given orthopedics for follow-up.  She had an MRI of her foot done as well on  showing tenosynovitis, osteoarthritis, and intermetatarsal bursitis without any other acute findings.  She states over the past couple of days however she has developed pain that is radiating from the foot up into her left thigh with a tightness sensation in the thigh and partially in her calf.  She also states today she noticed pain in the right upper lateral back area that she states feels almost identical to when she had a blood clot previously.  She denies any chest pressure, shortness of breath, lightheadedness or syncope.  She denies any recent travel.  She denies any recent surgeries or hospitalizations.  She is not on any estrogen containing hormones.  She denies fevers, cough, abdominal pain, vomiting, diarrhea, dysuria, hematuria, change in urine or bowel trauma to her back or leg, numbness in her leg, or any other complaints at this time. She states she came here primarily due to concern for poss recurrence of PE or possibly a DVT in her leg.     Review of Systems:  Review of Systems  Positive for  stated complaint: left foot/leg pain, back pain. Constitutional and vital signs reviewed. All other systems reviewed and negative except as noted above.    Patient History:  Past Medical History:    Abnormal mammogram of right breast    Had a biopsy     Anesthesia complication    difficulty in waking up    Asthma (HCC)    exercised induced    Claustrophobia    Essential hypertension    Helicobacter positive gastritis    on abx therapy    High blood pressure    History of blood transfusion    2013 at the time of myomectomy.    IBS (irritable bowel syndrome)    constipation    Kidney donor    right    Pulmonary embolism (HCC)    Bilateral PE, symptomatic.  Had myomectomy 1-2 months prior for symptomatic fibroids.  Negative work up for coagulation disorders.  Treated for 1 year.    Tubular adenoma of colon    None in 2024, repeat CLN in 2031       Past Surgical History:   Procedure Laterality Date    Colonoscopy  2011    Colonoscopy      Colonoscopy N/A 6/11/2024    Procedure: COLONOSCOPY;  Surgeon: JOSEE Lucio MD;  Location: [facility] ENDOSCOPY    Egd  2011    Lasik Bilateral 2010    Needle biopsy right      Nephrectomy Right 1999    Nephrectomy      Other  2015    UFE    Prior myomectomy      Reduction left      Reduction right      Upper gi endoscopy,exam      Uterine fibroid embolization perq w/rad gid          Family History   Problem Relation Age of Onset    Diabetes Mother     Hypertension Mother     Glaucoma Mother     Prostate Cancer Father     Cancer Father         prostate    Hypertension Father     Glaucoma Father     Cancer Sister         cervical    Hypertension Sister     Heart Disorder Sister     Cancer Sister         cervical    Hypertension Sister     Hypertension Brother     Macular degeneration Neg        Specific Social Determinants of Health:   Social History     Socioeconomic History    Marital status: Single    Number of children: 0   Tobacco Use    Smoking status: Never    Smokeless tobacco:  Never   Vaping Use    Vaping status: Never Used   Substance and Sexual Activity    Alcohol use: No     Alcohol/week: 0.0 standard drinks of alcohol    Drug use: Never   Other Topics Concern    Pt has a pacemaker No    Pt has a defibrillator No    Reaction to local anesthetic No           PSFH elements reviewed from today and agreed except as otherwise stated in HPI.    Physical Exam     ED Triage Vitals   BP 06/21/24 1551 110/69   Pulse 06/21/24 1551 82   Resp 06/21/24 1551 18   Temp 06/21/24 1551 97.9 °F (36.6 °C)   Temp src --    SpO2 06/21/24 1551 98 %   O2 Device 06/21/24 1812 None (Room air)       Current:BP (!) 126/96   Pulse 75   Temp 97.9 °F (36.6 °C)   Resp 17   Ht 175.3 cm (5' 9\")   Wt 82.1 kg   LMP 03/01/2021 (Exact Date)   SpO2 99%   BMI 26.73 kg/m²         Physical Exam  Vitals and nursing note reviewed.   Constitutional:       General: She is not in acute distress.     Appearance: She is not ill-appearing.   HENT:      Head: Normocephalic and atraumatic.      Right Ear: External ear normal.      Left Ear: External ear normal.      Nose: Nose normal.      Mouth/Throat:      Mouth: Mucous membranes are moist.   Eyes:      Conjunctiva/sclera: Conjunctivae normal.   Cardiovascular:      Rate and Rhythm: Normal rate and regular rhythm.      Heart sounds: No murmur heard.     Comments: 2+ radial and dp pulses bilaterally   Pulmonary:      Effort: Pulmonary effort is normal. No respiratory distress.      Breath sounds: No stridor. No wheezing, rhonchi or rales.   Abdominal:      General: There is no distension.      Palpations: Abdomen is soft.      Tenderness: There is no abdominal tenderness. There is no guarding or rebound.   Musculoskeletal:         General: Swelling and tenderness present. No deformity.      Right lower leg: No edema.      Left lower leg: No edema.      Comments: There is no midline c/t/l spine ttp. No stepoff or deformity. Reproducible R lateral thoracic paraspinal mm tenderness  and R lateral posterior rib angle ttp without erythema, crepitance, or ecchymosis. Mild swelling of L foot present as compared to R without erythema or crepitance. Pt able to flex/extend at bilat hips, knees, and ankles, though reports thigh pain on ROM testing of L knee. L knee negative A/P drawer, stable varus/valgus, compartments soft in the L thigh and lower leg. No swelling to L knee.     Skin:     General: Skin is warm and dry.      Capillary Refill: Capillary refill takes less than 2 seconds.      Findings: No bruising or rash.   Neurological:      General: No focal deficit present.      Mental Status: She is alert.      Comments: Strength antigrav in bilat LE. 5/5 on knee flexion/extension in bilat LE as well as plantarflexion/dorsiflexion of bilat ankles. SILT bilat LE prox and distally and symmetric.          ED Course   Labs:   Labs Reviewed   BASIC METABOLIC PANEL (8) - Abnormal; Notable for the following components:       Result Value    Chloride 113 (*)     Creatinine 1.50 (*)     Calculated Osmolality 300 (*)     eGFR-Cr 41 (*)     All other components within normal limits   D-DIMER - Normal   TROPONIN I HIGH SENSITIVITY - Normal   CBC WITH DIFFERENTIAL WITH PLATELET    Narrative:     The following orders were created for panel order CBC With Differential With Platelet.  Procedure                               Abnormality         Status                     ---------                               -----------         ------                     CBC W/ DIFFERENTIAL[[id number]]                              Final result                 Please view results for these tests on the individual orders.   CBC W/ DIFFERENTIAL     Radiology findings:  I personally reviewed the images.   US VENOUS DOPPLER LEG LEFT - DIAG IMG (CPT=93971)    Result Date: 6/21/2024  CONCLUSION:  1. No left lower extremity DVT.    Dictated by (CST): Branden Fair MD on 6/21/2024 at 6:54 PM     Finalized by (CST): Branden Fair MD on  6/21/2024 at 6:54 PM           Cardiac Monitor: Interpreted by me.   Pulse Readings from Last 1 Encounters:   06/21/24 75   , sinus,     MDM   Very pleasant 58-year-old history of prior saddle PE in the context of surgery, not currently on anticoagulation, recently seen and treated for possible bursitis in her left foot, presents with left thigh cramping, pain to the right lateral rib area that is atraumatic, without any chest pain, shortness breath, syncope, numbness or tingling, or other complaints.  She is hemodynamically stable afebrile well-appearing with soft compartments left lower extremity, intact distal pulses, unremarkable cardiopulmonary exam, with reproducible tenderness over the right lateral rib area without any ecchymosis, skin changes, or abnormal lung sounds.    Differential includes possibly costochondritis, msk strain/spasm, leg cramps/strain in the setting of her injury to her left foot, lower suspicion DVT or PE, low suspicion for acute cardiopulmonary process, low suspicion pneumothorax, pneumonia, rib fracture.     Plan: cbc, bmp, dimer, trop, lido patch for pain.   ED Course as of 06/22/24 0151  ------------------------------------------------------------  Time: 06/21 1729  Comment: Cbc unremarkable  ------------------------------------------------------------  Time: 06/21 1749  Value: D-Dimer: 0.36  Comment: D-dimer is normal.  ------------------------------------------------------------  Time: 06/21 1817  Value: Troponin I (High Sensitivity): <3  Comment: (Reviewed)  ------------------------------------------------------------  Time: 06/21 1817  Value: CREATININE(!): 1.50  Comment: Uptrend from prior 1.1-1.3   ------------------------------------------------------------  Time: 06/21 1903  Value: US VENOUS DOPPLER LEG LEFT - DIAG IMG (CPT=93971)  Comment: CONCLUSION:   1. No left lower extremity DVT.     Pt reevaluated updated with results. Updated her that Cr is up slightly from her  baseline of 1.1-1.2 and she needs to f/u with PMD for reassessment. Pt notes barely drank any water today and will stay hydrated. Counseled her on pain control, strict return precautions and f/u with her orthopedics team. She verb understanding comfortable with DC plan.             Procedures:  Procedures        Disposition and Plan     Clinical Impression:  1. Left thigh pain    2. Elevated serum creatinine        Disposition:  Discharge    Follow-up:  Jeison Dale DO  [address] [place] [address]  [facility] [id number] [phone]    Schedule an appointment as soon as possible for a visit in 3 day(s)      [facility] Emergency Department  [address]  [facility] [id number] [phone]  Go to  If symptoms worsen, immediately      Medications Prescribed:  Discharge Medication List as of 6/21/2024  7:39 PM            This note may have been created using voice dictation technology and may include inadvertent errors.      Wen Neri DO  Attending Physician   Emergency Medicine

## 2024-06-27 ENCOUNTER — PATIENT MESSAGE (OUTPATIENT)
Dept: FAMILY MEDICINE CLINIC | Facility: CLINIC | Age: 59
End: 2024-06-27

## 2024-06-27 DIAGNOSIS — I10 ESSENTIAL HYPERTENSION: ICD-10-CM

## 2024-06-27 RX ORDER — SPIRONOLACTONE 50 MG/1
50 TABLET, FILM COATED ORAL DAILY
Qty: 90 TABLET | Refills: 3 | Status: SHIPPED | OUTPATIENT
Start: 2024-06-27

## 2024-06-27 NOTE — TELEPHONE ENCOUNTER
From: Karen Damon  To: Jeison Dale  Sent: 6/27/2024 9:30 AM CDT  Subject: RX refill     Eloisa Reed my [facility] pharmacy is trying to refill my BP medication and stated they are not getting a response. I am not able to request the refill by the katia because I believe Dr. Yin name is on it. Can you please refill it to [facility] in [facility] ER. Thanks have a blessed day

## 2024-07-01 RX ORDER — SPIRONOLACTONE 50 MG/1
50 TABLET, FILM COATED ORAL DAILY
Qty: 90 TABLET | Refills: 1 | OUTPATIENT
Start: 2024-07-01

## 2024-07-30 ENCOUNTER — PATIENT MESSAGE (OUTPATIENT)
Dept: FAMILY MEDICINE CLINIC | Facility: CLINIC | Age: 59
End: 2024-07-30

## 2024-07-31 ENCOUNTER — LAB ENCOUNTER (OUTPATIENT)
Dept: LAB | Facility: HOSPITAL | Age: 59
End: 2024-07-31
Attending: FAMILY MEDICINE
Payer: COMMERCIAL

## 2024-07-31 ENCOUNTER — TELEPHONE (OUTPATIENT)
Dept: FAMILY MEDICINE CLINIC | Facility: CLINIC | Age: 59
End: 2024-07-31

## 2024-07-31 DIAGNOSIS — R82.90 CLOUDY URINE: ICD-10-CM

## 2024-07-31 DIAGNOSIS — R82.90 CLOUDY URINE: Primary | ICD-10-CM

## 2024-07-31 LAB
BILIRUB UR QL: NEGATIVE
COLOR UR: YELLOW
GLUCOSE UR-MCNC: NORMAL MG/DL
KETONES UR-MCNC: NEGATIVE MG/DL
LEUKOCYTE ESTERASE UR QL STRIP.AUTO: 500
PH UR: 5.5 [PH] (ref 5–8)
PROT UR-MCNC: 30 MG/DL
RBC #/AREA URNS AUTO: >10 /HPF
SP GR UR STRIP: 1.02 (ref 1–1.03)
UROBILINOGEN UR STRIP-ACNC: NORMAL
WBC #/AREA URNS AUTO: >50 /HPF

## 2024-07-31 PROCEDURE — 87088 URINE BACTERIA CULTURE: CPT

## 2024-07-31 PROCEDURE — 87086 URINE CULTURE/COLONY COUNT: CPT

## 2024-07-31 PROCEDURE — 81001 URINALYSIS AUTO W/SCOPE: CPT

## 2024-07-31 PROCEDURE — 87186 SC STD MICRODIL/AGAR DIL: CPT

## 2024-07-31 NOTE — TELEPHONE ENCOUNTER
See [employer] message  Pt called -x 3 days , no burning but have urgency, cloudy urine , odor   Asking to submit urine, no appts         Eloisa Dale my urine is cloudy and have a funny odor to it. Can you oder urine test or will I have to can in for appointment

## 2024-07-31 NOTE — TELEPHONE ENCOUNTER
From: Karen Damon  To: Jeison Dale  Sent: 7/30/2024 4:39 PM CDT  Subject: Possible UTI    Hello Dr. Dale my urine is cloudy and have a funny odor to it. Can you oder urine test or will I have to can in for appointment  
See acute phone encounter.  
Spine appears normal, movement of extremities grossly intact.

## 2024-08-01 RX ORDER — NITROFURANTOIN 25; 75 MG/1; MG/1
100 CAPSULE ORAL 2 TIMES DAILY
Qty: 10 CAPSULE | Refills: 0 | Status: SHIPPED | OUTPATIENT
Start: 2024-08-01 | End: 2024-08-06

## 2024-08-01 NOTE — TELEPHONE ENCOUNTER
Please let her know Rx for Macrobid sent to pharmacy.  Push a lot of fluids.  Call if not improved in 5 days, sooner if worsening.

## 2024-08-12 ENCOUNTER — NURSE TRIAGE (OUTPATIENT)
Dept: FAMILY MEDICINE CLINIC | Facility: CLINIC | Age: 59
End: 2024-08-12

## 2024-08-12 DIAGNOSIS — R51.9 ACUTE NONINTRACTABLE HEADACHE, UNSPECIFIED HEADACHE TYPE: Primary | ICD-10-CM

## 2024-08-12 DIAGNOSIS — J00 ACUTE RHINITIS: ICD-10-CM

## 2024-08-12 NOTE — TELEPHONE ENCOUNTER
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  Was exposed to someone who tested positive for Covid yesterday.  She took a test yesterday and it was negative.  Wants to do PCR Covid test.  Has headache, runny nose, back pain, sneezing.  Denies chest pain, wheezing or shortness of breath.  Declines option to go to Immediate Care now.  Per protocol, ordered [facility] Covid test.  Patient stated understanding.    Reason for call: Acute Exposed to Covid  Onset: Yesterday     Spoke with patient,  verified.   No audible wheeze or cough noted.    Reason for Disposition   HIGH RISK patient (e.g., weak immune system, age > 64 years, obesity with BMI of 30 or higher, pregnant, chronic lung disease or other chronic medical condition) and COVID symptoms (e.g., cough, fever)  (Exceptions: Already seen by doctor or NP/PA and no new or worsening symptoms.)    Protocols used: Coronavirus (COVID-19) Diagnosed or [id number]

## 2024-08-14 DIAGNOSIS — E66.9 OBESITY (BMI 30-39.9): ICD-10-CM

## 2024-08-14 RX ORDER — SEMAGLUTIDE 1.7 MG/.75ML
0.75 INJECTION, SOLUTION SUBCUTANEOUS WEEKLY
Refills: 3 | OUTPATIENT
Start: 2024-08-14 | End: 2024-09-13

## 2024-08-28 RX ORDER — RITLECITINIB 50 MG/1
CAPSULE ORAL
Qty: 28 CAPSULE | Refills: 2 | Status: SHIPPED | OUTPATIENT
Start: 2024-08-28

## 2024-09-10 ENCOUNTER — APPOINTMENT (OUTPATIENT)
Dept: DERMATOLOGY | Age: 59
End: 2024-09-10

## 2024-10-06 ENCOUNTER — PATIENT MESSAGE (OUTPATIENT)
Dept: SURGERY | Facility: CLINIC | Age: 59
End: 2024-10-06

## 2024-10-07 DIAGNOSIS — E66.3 OVERWEIGHT (BMI 25.0-29.9): ICD-10-CM

## 2024-10-07 NOTE — TELEPHONE ENCOUNTER
PA resubmitted with documentation on 5% body fat weight loss.    Case ID:[id number]    Will await response.     MCM sent to patient on the above.

## 2024-10-15 NOTE — PATIENT INSTRUCTIONS
Take vitamin B6 100 mg twice a day for muscle cramps. in setting of suspected component of hypokinetic dysarthria vs. reduced effort/hypophonic

## 2024-10-22 ENCOUNTER — DOCUMENTATION ONLY (OUTPATIENT)
Dept: SURGERY | Facility: CLINIC | Age: 59
End: 2024-10-22

## 2024-11-07 ENCOUNTER — OFFICE VISIT (OUTPATIENT)
Dept: OTOLARYNGOLOGY | Facility: CLINIC | Age: 59
End: 2024-11-07

## 2024-11-07 ENCOUNTER — OFFICE VISIT (OUTPATIENT)
Dept: AUDIOLOGY | Facility: CLINIC | Age: 59
End: 2024-11-07

## 2024-11-07 DIAGNOSIS — H91.90 HEARING LOSS, UNSPECIFIED HEARING LOSS TYPE, UNSPECIFIED LATERALITY: Primary | ICD-10-CM

## 2024-11-07 DIAGNOSIS — H90.3 SENSORINEURAL HEARING LOSS, BILATERAL: Primary | ICD-10-CM

## 2024-11-07 DIAGNOSIS — H91.8X3 ASYMMETRICAL HEARING LOSS: ICD-10-CM

## 2024-11-07 PROCEDURE — 92557 COMPREHENSIVE HEARING TEST: CPT | Performed by: AUDIOLOGIST

## 2024-11-07 PROCEDURE — 92567 TYMPANOMETRY: CPT | Performed by: AUDIOLOGIST

## 2024-11-07 PROCEDURE — 99213 OFFICE O/P EST LOW 20 MIN: CPT | Performed by: SPECIALIST

## 2024-11-07 RX ORDER — RITLECITINIB 50 MG/1
1 CAPSULE ORAL DAILY
COMMUNITY
Start: 2024-08-28

## 2024-11-07 NOTE — PROGRESS NOTES
Karen Damon is a 59 year old female.   Chief Complaint   Patient presents with    Hearing Loss     Difficulty hearing    Ear Problem     Ears feel clogged     HPI:   Left serous otitis media which is now clear.  She still feels some decreased hearing in the left ear.    Current Outpatient Medications   Medication Sig Dispense Refill    LITFULO 50 MG Oral Cap Take 1 capsule by mouth daily.      spironolactone 50 MG Oral Tab Take 1 tablet (50 mg total) by mouth daily. 90 tablet 3    LINZESS 290 MCG Oral Cap TAKE 1 CAPSULE (290 MCG TOTAL) BY MOUTH EVERY MORNING BEFORE BREAKFAST 90 capsule 3    OLUMIANT 4 MG Oral Tab       topiramate 50 MG Oral Tab Take 1 tablet (50 mg total) by mouth 2 (two) times daily. 180 tablet 1    albuterol (PROAIR HFA) 108 (90 Base) MCG/ACT Inhalation Aero Soln Inhale 2 puffs into the lungs every 4 (four) hours as needed for Wheezing. 3 each 1    Budesonide-Formoterol Fumarate (SYMBICORT) 80-4.5 MCG/ACT Inhalation Aerosol Inhale 2 puffs into the lungs 2 (two) times daily. 1 each 2    OFLOXACIN 0.3 % Otic Solution PLACE 5 DROPS INTO BOTH EARS 2 TIMES DAILY FOR 7 DAYS. 5 mL 0    tacrolimus 0.1 % External Ointment Apply 1 Application topically 2 (two) times daily.      Tofacitinib Citrate 5 MG Oral Tab Take 5 mg by mouth 2 (two) times daily.      Ruxolitinib Phosphate (OPZELURA) 1.5 % External Cream Apply 1 Application topically 2 (two) times a day. 60 g 3    triamcinolone 0.1 % External Cream Apply 1 Application topically 3 (three) times daily. To areas arms and legs as needed for eczema 454 g 2    Betamethasone Dipropionate Aug 0.05 % External Cream Apply to worst areas of eczema as needed bid 50 g 1    levocetirizine 5 MG Oral Tab Take 1 tablet (5 mg total) by mouth every evening. 30 tablet 3    triamcinolone 0.5 % External Cream Use bid as directed 60 g 1    albuterol sulfate (2.5 MG/3ML) 0.083% Inhalation Nebu Soln Take 3 mL (2.5 mg total) by nebulization every 4 (four) hours as  needed for Wheezing. 50 ampule 3      Past Medical History:    Abnormal mammogram of right breast    Had a biopsy     Anesthesia complication    difficulty in waking up    Asthma (HCC)    exercised induced    Claustrophobia    Essential hypertension    Helicobacter positive gastritis    on abx therapy    High blood pressure    History of blood transfusion    2013 at the time of myomectomy.    IBS (irritable bowel syndrome)    constipation    Kidney donor    right    Pulmonary embolism (HCC)    Bilateral PE, symptomatic.  Had myomectomy 1-2 months prior for symptomatic fibroids.  Negative work up for coagulation disorders.  Treated for 1 year.    Tubular adenoma of colon    None in 2024, repeat CLN in 2031      Social History:  Social History     Socioeconomic History    Marital status: Single    Number of children: 0   Tobacco Use    Smoking status: Never    Smokeless tobacco: Never   Vaping Use    Vaping status: Never Used   Substance and Sexual Activity    Alcohol use: No     Alcohol/week: 0.0 standard drinks of alcohol    Drug use: Never   Other Topics Concern    Pt has a pacemaker No    Pt has a defibrillator No    Reaction to local anesthetic No        REVIEW OF SYSTEMS:   GENERAL HEALTH: feels well otherwise  GENERAL : denies fever, chills, sweats, weight loss, weight gain  SKIN: denies any unusual skin lesions or rashes  RESPIRATORY: denies shortness of breath with exertion  NEURO: denies headaches    EXAM:   LMP 03/01/2021 (Exact Date)   System Details   Skin Inspection - Normal.   Constitutional Overall appearance - Normal.   Head/Face Facial features - Normal. Eyebrows - Normal. Skull - Normal.   Eyes Conjunctiva - Right: Normal, Left: Normal. Pupil - Right: Normal, Left: Normal.    Ears Inspection - Right: Normal, Left: Normal.   Canal - Right: Normal, Left: Normal.   TM - Right: Normal, Left: Normal.   Nasal External nose - Normal.   Nasal septum - Normal.  Turbinates - Normal.   Oral/Oropharynx Lips -  Normal, Tonsils - Normal, Tongue - Normal    Neck Exam Inspection - Normal. Palpation - Normal. Parotid gland - Normal. Thyroid gland - Normal.  No carotid bruits by auscultation   Lymph Detail Submental. Submandibular. Anterior cervical. Posterior cervical. Supraclavicular all without enlargement   Psychiatric Orientation - Oriented to time, place, person & situation. Appropriate mood and affect.   Neurological Memory - Normal. Cranial nerves - Cranial nerves II through XII grossly intact.     ASSESSMENT AND PLAN:   1. Hearing loss, unspecified hearing loss type, unspecified laterality  Very mild hearing loss at 4000 Hz.  This is most consistent with a noise pattern.  Patient however has no history of noise exposure.  audiograham was reviewed with the patient at the time of the visit and she was given a copy of the audiogram.  - Audiology Referral - In Network    2. Asymmetrical hearing loss  No old audiogram for comparison, would repeat the audiogram in 1 years time, sooner if problems.      The patient indicates understanding of these issues and agrees to the plan.      Patti Du MD  11/7/2024  3:41 PM

## 2024-11-07 NOTE — PATIENT INSTRUCTIONS
You have a very slight asymmetric hearing loss which is mild at 4000 Hz on the left.  There are no old audiograms for comparison.  Word discrimination score is perfect at 100% bilaterally.  Although this looks like a noise pattern, there is no noise exposure to explain it.  Repeat the audiogram in 1 years time, sooner if problems.

## 2024-11-12 RX ORDER — RITLECITINIB 50 MG/1
CAPSULE ORAL
Qty: 28 CAPSULE | Refills: 2 | Status: ACTIVE | OUTPATIENT
Start: 2024-11-12

## 2024-11-21 ENCOUNTER — OFFICE VISIT (OUTPATIENT)
Dept: SURGERY | Facility: CLINIC | Age: 59
End: 2024-11-21
Payer: COMMERCIAL

## 2024-11-21 VITALS
OXYGEN SATURATION: 99 % | WEIGHT: 182.81 LBS | DIASTOLIC BLOOD PRESSURE: 70 MMHG | HEIGHT: 69 IN | BODY MASS INDEX: 27.08 KG/M2 | SYSTOLIC BLOOD PRESSURE: 114 MMHG | HEART RATE: 82 BPM

## 2024-11-21 DIAGNOSIS — E66.3 OVERWEIGHT (BMI 25.0-29.9): ICD-10-CM

## 2024-11-21 DIAGNOSIS — E88.819 INSULIN RESISTANCE: ICD-10-CM

## 2024-11-21 DIAGNOSIS — Z51.81 ENCOUNTER FOR THERAPEUTIC DRUG MONITORING: ICD-10-CM

## 2024-11-21 DIAGNOSIS — I10 ESSENTIAL HYPERTENSION: Primary | ICD-10-CM

## 2024-11-21 RX ORDER — TOPIRAMATE 50 MG/1
50 TABLET, FILM COATED ORAL 2 TIMES DAILY
Qty: 180 TABLET | Refills: 1 | Status: SHIPPED | OUTPATIENT
Start: 2024-11-21

## 2024-11-21 NOTE — PROGRESS NOTES
[facility], [facility], [place]  [address]  [facility] [address]  Dept: [phone]     Patient:  Karen Damon  :      1965  MRN:      [id number]    Chief Complaint:    Chief Complaint   Patient presents with    Follow - Up    Weight Management       SUBJECTIVE     History of Present Illness:  Karen is being seen today for a follow-up for weight management follow up    Past Medical History:   Past Medical History:    Abnormal mammogram of right breast    Had a biopsy     Anesthesia complication    difficulty in waking up    Asthma (HCC)    exercised induced    Claustrophobia    Essential hypertension    Helicobacter positive gastritis    on abx therapy    High blood pressure    History of blood transfusion     at the time of myomectomy.    IBS (irritable bowel syndrome)    constipation    Kidney donor    right    Pulmonary embolism (HCC)    Bilateral PE, symptomatic.  Had myomectomy 1-2 months prior for symptomatic fibroids.  Negative work up for coagulation disorders.  Treated for 1 year.    Tubular adenoma of colon    None in , repeat CLN in         Comorbidities:  Other:  none    OBJECTIVE     Vitals: /70   Pulse 82   Ht 5' 9\" (1.753 m)   Wt 182 lb 12.8 oz (82.9 kg)   LMP 2021 (Exact Date)   SpO2 99%   BMI 26.99 kg/m²     Initial weight loss: -13   Total weight loss: -41   Start weight: 224    Wt Readings from Last 3 Encounters:   24 182 lb 12.8 oz (82.9 kg)   10/18/24 184 lb (83.5 kg)   24 181 lb (82.1 kg)       Patient Medications:    Current Outpatient Medications   Medication Sig Dispense Refill    LITFULO 50 MG Oral Cap Take 1 capsule by mouth daily.      spironolactone 50 MG Oral Tab Take 1 tablet (50 mg total) by mouth daily. 90 tablet 3    LINZESS 290 MCG Oral Cap TAKE 1 CAPSULE (290 MCG TOTAL) BY MOUTH EVERY MORNING BEFORE BREAKFAST 90 capsule 3    topiramate 50 MG Oral Tab Take 1  tablet (50 mg total) by mouth 2 (two) times daily. 180 tablet 1    albuterol (PROAIR HFA) 108 (90 Base) MCG/ACT Inhalation Aero Soln Inhale 2 puffs into the lungs every 4 (four) hours as needed for Wheezing. 3 each 1    Budesonide-Formoterol Fumarate (SYMBICORT) 80-4.5 MCG/ACT Inhalation Aerosol Inhale 2 puffs into the lungs 2 (two) times daily. 1 each 2    tacrolimus 0.1 % External Ointment Apply 1 Application topically 2 (two) times daily.      Ruxolitinib Phosphate (OPZELURA) 1.5 % External Cream Apply 1 Application topically 2 (two) times a day. 60 g 3    triamcinolone 0.1 % External Cream Apply 1 Application topically 3 (three) times daily. To areas arms and legs as needed for eczema 454 g 2    Betamethasone Dipropionate Aug 0.05 % External Cream Apply to worst areas of eczema as needed bid 50 g 1    levocetirizine 5 MG Oral Tab Take 1 tablet (5 mg total) by mouth every evening. 30 tablet 3    triamcinolone 0.5 % External Cream Use bid as directed 60 g 1    albuterol sulfate (2.5 MG/3ML) 0.083% Inhalation Nebu Soln Take 3 mL (2.5 mg total) by nebulization every 4 (four) hours as needed for Wheezing. 50 ampule 3    OLUMIANT 4 MG Oral Tab  (Patient not taking: Reported on 11/21/2024)      OFLOXACIN 0.3 % Otic Solution PLACE 5 DROPS INTO BOTH EARS 2 TIMES DAILY FOR 7 DAYS. (Patient not taking: Reported on 11/21/2024) 5 mL 0    Tofacitinib Citrate 5 MG Oral Tab Take 5 mg by mouth 2 (two) times daily. (Patient not taking: Reported on 11/21/2024)       Allergies:  Amoxicillin, Bactrim [sulfamethoxazole w/trimethoprim], Flagyl [metronidazole], and Flu virus vaccine     Social History:    Social History     Socioeconomic History    Marital status: Single     Spouse name: Not on file    Number of children: 0    Years of education: Not on file    Highest education level: Not on file   Occupational History    Not on file   Tobacco Use    Smoking status: Never    Smokeless tobacco: Never   Vaping Use    Vaping status:  Never Used   Substance and Sexual Activity    Alcohol use: No     Alcohol/week: 0.0 standard drinks of alcohol    Drug use: Never    Sexual activity: Not on file   Other Topics Concern    Grew up on a farm Not Asked    History of tanning Not Asked    Outdoor occupation Not Asked    Pt has a pacemaker No    Pt has a defibrillator No    Breast feeding Not Asked    Reaction to local anesthetic No   Social History Narrative    Not on file     Social Drivers of Health     Financial Resource Strain: Not on file   Food Insecurity: Not on file   Transportation Needs: Not on file   Physical Activity: Not on file   Stress: Not on file   Social Connections: Not on file   Housing Stability: Not on file     Surgical History:    Past Surgical History:   Procedure Laterality Date    Colonoscopy  2011    Colonoscopy      Colonoscopy N/A 6/11/2024    Procedure: COLONOSCOPY;  Surgeon: JOSEE Lucio MD;  Location: [facility] ENDOSCOPY    Egd  2011    Lasik Bilateral 2010    Needle biopsy right      Nephrectomy Right 1999    Nephrectomy      Other  2015    UFE    Prior myomectomy      Reduction left      Reduction right      Upper gi endoscopy,exam      Uterine fibroid embolization perq w/rad gid       Family History:    Family History   Problem Relation Age of Onset    Diabetes Mother     Hypertension Mother     Glaucoma Mother     Prostate Cancer Father     Cancer Father         prostate    Hypertension Father     Glaucoma Father     Cancer Sister         cervical    Hypertension Sister     Heart Disorder Sister     Cancer Sister         cervical    Hypertension Sister     Hypertension Brother     Macular degeneration Neg        Food Journal  Reviewed and Discussed:       Patient has a Food Journal?: no   Patient is reading nutrition labels?  yes  Average Caloric Intake:     Average CHO Intake: <100  Is patient exercising? yes  Type of exercise? Walking    Eating Habits  Patient states the following:  Eats 3 meal(s) per day  Length of  time it takes to consume a meal:  15 min  # of snacks per day: 1-2 Type of snacks:  dark chocolate, raisins  Amount of soda consumption per day:  none  Amount of water (in ounces) per day:  adequate  Drinking between meals only:  no  Toughest challenge:  sweets    Nutritional Goals  Limit carbohydrates to 100 gms per day, Eat 100-200 calories within 1 hour of waking  and Eat 3-4 cups of fresh fruits or vegetables daily    Behavior Modifications Reviewed and Discussed  Eat breakfast, Eat 3 meals per day, Plan meals in advance, Read nutrition labels, Drink 64 oz of water per day, Maintain a daily food journal, No drinking 30 minutes before or after meals, Utlize portion control strategies to reduce calorie intake, Identify triggers for eating and manage cues and Eat slowly and take 20 to 30 minutes to complete each meal    Exercise Goals Reviewed and Discussed    Walk for  45 Minutes    ROS:    Review of Systems   Constitutional: Negative.  Negative for activity change, appetite change and fever.   HENT: Negative.     Respiratory: Negative.  Negative for cough, shortness of breath and wheezing.    Cardiovascular: Negative.  Negative for chest pain, palpitations and leg swelling.   Gastrointestinal: Negative.  Negative for abdominal distention, abdominal pain, constipation, diarrhea, nausea and vomiting.   Genitourinary: Negative.  Negative for dysuria, flank pain and frequency.   Musculoskeletal: Negative.  Negative for back pain and gait problem.   Skin: Negative.    Neurological: Negative.  Negative for dizziness and headaches.   Psychiatric/Behavioral: Negative.         Physical Exam:   Physical Exam  Vitals reviewed.   Constitutional:       General: She is not in acute distress.     Appearance: She is well-developed.   HENT:      Head: Normocephalic and atraumatic.   Pulmonary:      Effort: Pulmonary effort is normal. No respiratory distress.   Abdominal:      Palpations: Abdomen is soft.   Musculoskeletal:          General: Normal range of motion.      Cervical back: Neck supple.   Skin:     General: Skin is warm and dry.   Neurological:      Mental Status: She is alert and oriented to person, place, and time.   Psychiatric:         Behavior: Behavior normal.         Thought Content: Thought content normal.         Judgment: Judgment normal.       ASSESSMENT     Encounter Diagnosis(ses):   Encounter Diagnoses   Name Primary?    Essential hypertension Yes    Insulin resistance     Encounter for therapeutic drug monitoring     Overweight (BMI 25.0-29.9)        PLAN   Patient is not interested in bariatric surgery. Patient desires to pursue traditional weight loss at this time.       Asthma: stable       Hx of PE: in 2013 after myomectomy; was on Coumadin x 1 year after PEs     Topiramte has been effective for Emmett and decreasing her sweet tooth.    Goals for next month:  1. Keep a food log using [employer]  2. Drink 48-64 ounces of water per day. No fruit juices or regular soda.  3. Increase aerobic exercises (goal is 150 minutes per week)  4. Increase fruit and vegetable servings/day  5. Keep carbs at or below 100g/day  6. Avoid skipping meals  7. Prepare meals and snacks in advance    Tolerating topiramate refill at 50 mg BID  Kidney donor    PHENTERMINE: not tolerating  Higher dose causing insomnia  discontinued  ekg done    Wegovy not covered    Compound semaglutide is a custom-made medication that mimics the GLP-1 hormone. It is used to treat type 2 diabetes and lower the risk of heart or blood vessel disease. It works by increasing insulin release, lowering glucagon release, delaying gastric emptying and reducing appetite. Compound semaglutide is prescribed when an FDA-approved medication, dose, or dosage form is unavailable (ie. Nationwide shortage or no obesity coverage for GLP-1 meds).     Cost of these medications is  dollars monthly based on dose.    Start at 2.4 mg          John Hahn MD

## 2024-12-26 ENCOUNTER — NURSE TRIAGE (OUTPATIENT)
Dept: FAMILY MEDICINE CLINIC | Facility: CLINIC | Age: 59
End: 2024-12-26

## 2024-12-26 ENCOUNTER — LAB ENCOUNTER (OUTPATIENT)
Dept: LAB | Age: 59
End: 2024-12-26
Attending: FAMILY MEDICINE
Payer: COMMERCIAL

## 2024-12-26 ENCOUNTER — OFFICE VISIT (OUTPATIENT)
Dept: FAMILY MEDICINE CLINIC | Facility: CLINIC | Age: 59
End: 2024-12-26

## 2024-12-26 VITALS
DIASTOLIC BLOOD PRESSURE: 63 MMHG | HEART RATE: 88 BPM | OXYGEN SATURATION: 96 % | RESPIRATION RATE: 18 BRPM | TEMPERATURE: 99 F | SYSTOLIC BLOOD PRESSURE: 92 MMHG

## 2024-12-26 DIAGNOSIS — R68.89 SENSATION OF FEELING COLD: ICD-10-CM

## 2024-12-26 DIAGNOSIS — R09.81 NASAL SINUS CONGESTION: Primary | ICD-10-CM

## 2024-12-26 DIAGNOSIS — R53.83 FATIGUE, UNSPECIFIED TYPE: ICD-10-CM

## 2024-12-26 DIAGNOSIS — R04.0 EPISTAXIS: ICD-10-CM

## 2024-12-26 DIAGNOSIS — R51.9 SINUS HEADACHE: ICD-10-CM

## 2024-12-26 DIAGNOSIS — J01.40 ACUTE PANSINUSITIS, RECURRENCE NOT SPECIFIED: ICD-10-CM

## 2024-12-26 LAB
ALBUMIN SERPL-MCNC: 4.4 G/DL (ref 3.2–4.8)
ALBUMIN/GLOB SERPL: 1.6 {RATIO} (ref 1–2)
ALP LIVER SERPL-CCNC: 80 U/L
ALT SERPL-CCNC: 11 U/L
ANION GAP SERPL CALC-SCNC: 7 MMOL/L (ref 0–18)
AST SERPL-CCNC: 16 U/L (ref ?–34)
BASOPHILS # BLD AUTO: 0.05 X10(3) UL (ref 0–0.2)
BASOPHILS NFR BLD AUTO: 0.7 %
BILIRUB SERPL-MCNC: 0.6 MG/DL (ref 0.3–1.2)
BUN BLD-MCNC: 16 MG/DL (ref 9–23)
BUN/CREAT SERPL: 12.5 (ref 10–20)
CALCIUM BLD-MCNC: 9.8 MG/DL (ref 8.7–10.4)
CHLORIDE SERPL-SCNC: 111 MMOL/L (ref 98–112)
CO2 SERPL-SCNC: 28 MMOL/L (ref 21–32)
CREAT BLD-MCNC: 1.28 MG/DL
DEPRECATED RDW RBC AUTO: 44.8 FL (ref 35.1–46.3)
EGFRCR SERPLBLD CKD-EPI 2021: 49 ML/MIN/1.73M2 (ref 60–?)
EOSINOPHIL # BLD AUTO: 0.14 X10(3) UL (ref 0–0.7)
EOSINOPHIL NFR BLD AUTO: 2.1 %
ERYTHROCYTE [DISTWIDTH] IN BLOOD BY AUTOMATED COUNT: 13.2 % (ref 11–15)
FASTING STATUS PATIENT QL REPORTED: YES
GLOBULIN PLAS-MCNC: 2.7 G/DL (ref 2–3.5)
GLUCOSE BLD-MCNC: 85 MG/DL (ref 70–99)
HCT VFR BLD AUTO: 44.2 %
HGB BLD-MCNC: 13.9 G/DL
IMM GRANULOCYTES # BLD AUTO: 0.01 X10(3) UL (ref 0–1)
IMM GRANULOCYTES NFR BLD: 0.1 %
LYMPHOCYTES # BLD AUTO: 2.38 X10(3) UL (ref 1–4)
LYMPHOCYTES NFR BLD AUTO: 35.1 %
MCH RBC QN AUTO: 29 PG (ref 26–34)
MCHC RBC AUTO-ENTMCNC: 31.4 G/DL (ref 31–37)
MCV RBC AUTO: 92.3 FL
MONOCYTES # BLD AUTO: 0.49 X10(3) UL (ref 0.1–1)
MONOCYTES NFR BLD AUTO: 7.2 %
NEUTROPHILS # BLD AUTO: 3.71 X10 (3) UL (ref 1.5–7.7)
NEUTROPHILS # BLD AUTO: 3.71 X10(3) UL (ref 1.5–7.7)
NEUTROPHILS NFR BLD AUTO: 54.8 %
OSMOLALITY SERPL CALC.SUM OF ELEC: 302 MOSM/KG (ref 275–295)
PLATELET # BLD AUTO: 227 10(3)UL (ref 150–450)
POTASSIUM SERPL-SCNC: 4.1 MMOL/L (ref 3.5–5.1)
PROT SERPL-MCNC: 7.1 G/DL (ref 5.7–8.2)
RBC # BLD AUTO: 4.79 X10(6)UL
SODIUM SERPL-SCNC: 146 MMOL/L (ref 136–145)
TSI SER-ACNC: 1.77 UIU/ML (ref 0.55–4.78)
WBC # BLD AUTO: 6.8 X10(3) UL (ref 4–11)

## 2024-12-26 PROCEDURE — 36415 COLL VENOUS BLD VENIPUNCTURE: CPT

## 2024-12-26 PROCEDURE — 3074F SYST BP LT 130 MM HG: CPT | Performed by: FAMILY MEDICINE

## 2024-12-26 PROCEDURE — 3078F DIAST BP <80 MM HG: CPT | Performed by: FAMILY MEDICINE

## 2024-12-26 PROCEDURE — 99214 OFFICE O/P EST MOD 30 MIN: CPT | Performed by: FAMILY MEDICINE

## 2024-12-26 PROCEDURE — 80053 COMPREHEN METABOLIC PANEL: CPT

## 2024-12-26 PROCEDURE — 84443 ASSAY THYROID STIM HORMONE: CPT

## 2024-12-26 PROCEDURE — 85025 COMPLETE CBC W/AUTO DIFF WBC: CPT

## 2024-12-26 RX ORDER — FEXOFENADINE HCL AND PSEUDOEPHEDRINE HCI 60; 120 MG/1; MG/1
1 TABLET, EXTENDED RELEASE ORAL 2 TIMES DAILY
Qty: 30 TABLET | Refills: 0 | Status: SHIPPED | OUTPATIENT
Start: 2024-12-26

## 2024-12-26 RX ORDER — DOXYCYCLINE 100 MG/1
100 CAPSULE ORAL 2 TIMES DAILY
Qty: 20 CAPSULE | Refills: 0 | Status: SHIPPED | OUTPATIENT
Start: 2024-12-26 | End: 2025-01-05

## 2024-12-26 NOTE — TELEPHONE ENCOUNTER
Action Requested: Summary for Provider     []  Critical Lab, Recommendations Needed  [] Need Additional Advice  []   FYI    []   Need Orders  [] Need Medications Sent to Pharmacy  []  Other     SUMMARY: Office visit.  Future Appointments   Date Time Provider Department Center   12/26/2024  1:20 PM Jeison Dale DO [facility]   6/3/2025  9:20 AM Jeison Dale DO [facility]   6/5/2025  4:45 PM [id number]   11/10/2025  7:00 AM Patti Du MD [facility]         Reason for call: Headache  Onset: Data Unavailable    Patient had a nose bleed today. She states she has never had a nosebleed before.  It stopped bleeding but she has a headache. She rates her headache a 7. She denies fever, numbness, weakness, stiff neck.     Reason for Disposition   Patient wants to be seen    Protocols used: Headache-A-OH

## 2024-12-26 NOTE — PROGRESS NOTES
Subjective:     Patient ID: Karen Damon is a 59 year old female.    This patient is a 59-year-old well-established -American female who presents to the clinic today with complaint of facial/head discomfort x 1 day without any obvious nasal congestion, however patient did have an episode of a nosebleed this morning which occurred after blowing her nose.  It took some time to control bleeding.  The patient is not hypertensive.  She does have a sibling who has had a problem with reoccurring epistaxis, but this is not a regular occurrence for the patient.    Patient reports that he went 2 weeks prior to her complaints of having a sensation of feeling cold.  In particular her feet feel as though someone has placed them in the refrigerated area.  Physical this time shows intact peripheral arterial pulses.    Physical exam also reveals tenderness in multiple sinuses.    Patient has 1 kidney and has only taken Tylenol for discomfort for relief.  Patient is hydrating.  Patient does not look toxic, but she does look fatigued.            History/Other:   Review of Systems  Current Outpatient Medications   Medication Sig Dispense Refill    fexofenadine-pseudoephedrine ER  MG Oral Tablet 12 Hr Take 1 tablet by mouth 2 (two) times daily. 30 tablet 0    doxycycline 100 MG Oral Cap Take 1 capsule (100 mg total) by mouth 2 (two) times daily for 10 days. 20 capsule 0    CUSTOM MEDICATION Semaglutide/ cyanocobalamin    2.4 mg-  Concentration: 5 mg/ 0.5 mL  Amount: 2.5 ML   Instructions: Inject 48 units / aka 0.48 ML sq q weekly 1 each 5    LITFULO 50 MG Oral Cap Take 1 capsule by mouth daily.      spironolactone 50 MG Oral Tab Take 1 tablet (50 mg total) by mouth daily. 90 tablet 3    LINZESS 290 MCG Oral Cap TAKE 1 CAPSULE (290 MCG TOTAL) BY MOUTH EVERY MORNING BEFORE BREAKFAST 90 capsule 3    albuterol (PROAIR HFA) 108 (90 Base) MCG/ACT Inhalation Aero Soln Inhale 2 puffs into the lungs every 4 (four) hours  as needed for Wheezing. 3 each 1    Budesonide-Formoterol Fumarate (SYMBICORT) 80-4.5 MCG/ACT Inhalation Aerosol Inhale 2 puffs into the lungs 2 (two) times daily. 1 each 2    tacrolimus 0.1 % External Ointment Apply 1 Application topically 2 (two) times daily.      triamcinolone 0.1 % External Cream Apply 1 Application topically 3 (three) times daily. To areas arms and legs as needed for eczema 454 g 2    triamcinolone 0.5 % External Cream Use bid as directed 60 g 1    albuterol sulfate (2.5 MG/3ML) 0.083% Inhalation Nebu Soln Take 3 mL (2.5 mg total) by nebulization every 4 (four) hours as needed for Wheezing. 50 ampule 3    topiramate 50 MG Oral Tab Take 1 tablet (50 mg total) by mouth 2 (two) times daily. 180 tablet 1    OLUMIANT 4 MG Oral Tab  (Patient not taking: Reported on 12/26/2024)      OFLOXACIN 0.3 % Otic Solution PLACE 5 DROPS INTO BOTH EARS 2 TIMES DAILY FOR 7 DAYS. (Patient not taking: Reported on 11/21/2024) 5 mL 0    Tofacitinib Citrate 5 MG Oral Tab Take 5 mg by mouth 2 (two) times daily. (Patient not taking: Reported on 12/26/2024)      Ruxolitinib Phosphate (OPZELURA) 1.5 % External Cream Apply 1 Application topically 2 (two) times a day. 60 g 3    Betamethasone Dipropionate Aug 0.05 % External Cream Apply to worst areas of eczema as needed bid 50 g 1    levocetirizine 5 MG Oral Tab Take 1 tablet (5 mg total) by mouth every evening. 30 tablet 3     Allergies:Allergies[1]    Past Medical History:    Abnormal mammogram of right breast    Had a biopsy     Anesthesia complication    difficulty in waking up    Asthma (HCC)    exercised induced    Claustrophobia    Essential hypertension    Helicobacter positive gastritis    on abx therapy    High blood pressure    History of blood transfusion    2013 at the time of myomectomy.    IBS (irritable bowel syndrome)    constipation    Kidney donor    right    Pulmonary embolism (HCC)    Bilateral PE, symptomatic.  Had myomectomy 1-2 months prior for  symptomatic fibroids.  Negative work up for coagulation disorders.  Treated for 1 year.    Tubular adenoma of colon    None in 2024, repeat CLN in 2031      Past Surgical History:   Procedure Laterality Date    Colonoscopy  2011    Colonoscopy      Colonoscopy N/A 6/11/2024    Procedure: COLONOSCOPY;  Surgeon: JOSEE Lucio MD;  Location: [facility] ENDOSCOPY    Egd  2011    Lasik Bilateral 2010    Needle biopsy right      Nephrectomy Right 1999    Nephrectomy      Other  2015    UFE    Prior myomectomy      Reduction left      Reduction right      Upper gi endoscopy,exam      Uterine fibroid embolization perq w/rad gid        Family History   Problem Relation Age of Onset    Diabetes Mother     Hypertension Mother     Glaucoma Mother     Prostate Cancer Father     Cancer Father         prostate    Hypertension Father     Glaucoma Father     Cancer Sister         cervical    Hypertension Sister     Heart Disorder Sister     Cancer Sister         cervical    Hypertension Sister     Hypertension Brother     Macular degeneration Neg       Social History:   Social History     Socioeconomic History    Marital status: Single    Number of children: 0   Tobacco Use    Smoking status: Never    Smokeless tobacco: Never   Vaping Use    Vaping status: Never Used   Substance and Sexual Activity    Alcohol use: No     Alcohol/week: 0.0 standard drinks of alcohol    Drug use: Never   Other Topics Concern    Pt has a pacemaker No    Pt has a defibrillator No    Reaction to local anesthetic No        Objective:   Vitals:    12/26/24 1327   BP: 92/63   Pulse: 88   Resp: 18   Temp: 99 °F (37.2 °C)       Physical Exam  Constitutional:       General: She is not in acute distress.     Appearance: Normal appearance. She is not ill-appearing.   HENT:      Head: Atraumatic.      Right Ear: Tympanic membrane normal.      Left Ear: Tympanic membrane normal.      Nose: Congestion present.      Left Nostril: Epistaxis present.      Comments:  Bilateral ethmoid and maxillary sinus tenderness with palpation.  Septal capillary exposed on the left.     Mouth/Throat:      Mouth: Mucous membranes are moist.   Cardiovascular:      Rate and Rhythm: Normal rate and regular rhythm.   Pulmonary:      Breath sounds: Normal breath sounds.   Neurological:      Mental Status: She is alert.         Assessment & Plan:   1. Acute pansinusitis, recurrence not specified  Antibiotics and oral decongestant prescribed.  See patient instructions.    2. Fatigue, unspecified type  The following labs have been ordered.  - CBC With Differential With Platelet; Future  - TSH W Reflex To Free T4; Future  - Comp Metabolic Panel (14) [E]; Future    3. Sinus headache  Prescribed.  - fexofenadine-pseudoephedrine ER  MG Oral Tablet 12 Hr; Take 1 tablet by mouth 2 (two) times daily.  Dispense: 30 tablet; Refill: 0    4. Nasal sinus congestion  Prescribed.  - fexofenadine-pseudoephedrine ER  MG Oral Tablet 12 Hr; Take 1 tablet by mouth 2 (two) times daily.  Dispense: 30 tablet; Refill: 0    5. Sensation of feeling cold  Workup initiated.  - CBC With Differential With Platelet; Future  - TSH W Reflex To Free T4; Future    6. Epistaxis  Septal capillary exposed.      Orders Placed This Encounter   Procedures    CBC With Differential With Platelet    TSH W Reflex To Free T4    Comp Metabolic Panel (14) [E]       Meds This Visit:  Requested Prescriptions     Signed Prescriptions Disp Refills    fexofenadine-pseudoephedrine ER  MG Oral Tablet 12 Hr 30 tablet 0     Sig: Take 1 tablet by mouth 2 (two) times daily.    doxycycline 100 MG Oral Cap 20 capsule 0     Sig: Take 1 capsule (100 mg total) by mouth 2 (two) times daily for 10 days.       Imaging & Referrals:  None     Patient Instructions   Patient has been encouraged to rest.  Additionally, increase clear water intake has been encouraged.  Joellen pot or nasal Navage may be of some benefit.  Doxycycline 100 mg capsule to be  taken twice a day for 10 days prescribed.  Allegra-D prescribed for nasal sinus congestion.  Patient should continue with extra strength Tylenol and continue to avoid anti-inflammatory medication.  We will run a CBC and TSH and a CMP in lieu of the patient's sensation of being cold complaint.  Clear fluid hydration. Rest. Take all medications as prescribed. If symptoms persist or worsen please call or return to clinic ASAP.    Return if symptoms worsen or fail to improve.         [1]   Allergies  Allergen Reactions    Amoxicillin RASH    Bactrim [Sulfamethoxazole W/Trimethoprim] HIVES    Flagyl [Metronidazole] HIVES    Flu Virus Vaccine      Respiratory Problems

## 2024-12-26 NOTE — PATIENT INSTRUCTIONS
Patient has been encouraged to rest.  Additionally, increase clear water intake has been encouraged.  Joellen pot or nasal Navage may be of some benefit.  Doxycycline 100 mg capsule to be taken twice a day for 10 days prescribed.  Allegra-D prescribed for nasal sinus congestion.  Patient should continue with extra strength Tylenol and continue to avoid anti-inflammatory medication.  We will run a CBC and TSH and a CMP in lieu of the patient's sensation of being cold complaint.  Clear fluid hydration. Rest. Take all medications as prescribed. If symptoms persist or worsen please call or return to clinic ASAP.

## 2025-01-13 ENCOUNTER — HOSPITAL ENCOUNTER (OUTPATIENT)
Age: 60
Discharge: HOME | End: 2025-01-13
Payer: COMMERCIAL

## 2025-01-13 ENCOUNTER — APPOINTMENT (OUTPATIENT)
Dept: GENERAL RADIOLOGY | Age: 60
End: 2025-01-13
Attending: NURSE PRACTITIONER
Payer: COMMERCIAL

## 2025-01-13 VITALS
RESPIRATION RATE: 12 BRPM | OXYGEN SATURATION: 100 % | TEMPERATURE: 98 F | SYSTOLIC BLOOD PRESSURE: 132 MMHG | HEART RATE: 70 BPM | DIASTOLIC BLOOD PRESSURE: 72 MMHG
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
## 2025-01-13 DIAGNOSIS — S39.012A STRAIN OF LUMBAR REGION, INITIAL ENCOUNTER: ICD-10-CM

## 2025-01-13 PROCEDURE — 72100 X-RAY EXAM L-S SPINE 2/3 VWS: CPT | Performed by: NURSE PRACTITIONER

## 2025-01-13 RX ORDER — CYCLOBENZAPRINE HCL 10 MG
10 TABLET ORAL 3 TIMES DAILY PRN
Qty: 10 TABLET | Refills: 0 | Status: SHIPPED | OUTPATIENT
Start: 2025-01-13
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
## 2025-01-14 NOTE — DISCHARGE INSTRUCTIONS
Continue Tylenol as needed for pain.  You may add Flexeril to relax the muscles.  This may make you sleepy so do not mix with alcohol or take prior to going to work or driving.  Apply ice to the sore areas.  Gentle massage and stretching.  Expect to be sore for a few more days.  Follow-up with your primary doctor if no improvement
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
## 2025-01-28 RX ORDER — RITLECITINIB 50 MG/1
CAPSULE ORAL
Qty: 28 CAPSULE | Refills: 2 | Status: ACTIVE | OUTPATIENT
Start: 2025-01-28

## 2025-02-24 ENCOUNTER — NURSE TRIAGE (OUTPATIENT)
Dept: FAMILY MEDICINE CLINIC | Facility: CLINIC | Age: 60
End: 2025-02-24

## 2025-02-24 NOTE — TELEPHONE ENCOUNTER
Action Requested: Summary for Provider     []  Critical Lab, Recommendations Needed  [] Need Additional Advice  []   FYI    []   Need Orders  [] Need Medications Sent to Pharmacy  []  Other     SUMMARY: Recommend visit within 3 days     Future Appointments   Date Time Provider Department Center   2/25/2025  6:40 PM Melissa Mcrae APRN [facility]       Reason for call: Lump  Onset: 1 weeks     Left axilla discomfort for 1 week, slight swelling and warm to touch. Afebrile. Mammogram within the past year     Reason for Disposition   Small swelling or lump present > 1 week    Protocols used: Skin Lump or Localized Swelling-A-OH

## 2025-02-25 ENCOUNTER — OFFICE VISIT (OUTPATIENT)
Dept: FAMILY MEDICINE CLINIC | Facility: CLINIC | Age: 60
End: 2025-02-25

## 2025-02-25 VITALS
BODY MASS INDEX: 27 KG/M2 | DIASTOLIC BLOOD PRESSURE: 71 MMHG | SYSTOLIC BLOOD PRESSURE: 107 MMHG | HEART RATE: 78 BPM | OXYGEN SATURATION: 100 % | HEIGHT: 69 IN

## 2025-02-25 DIAGNOSIS — R22.32 AXILLARY LUMP, LEFT: Primary | ICD-10-CM

## 2025-02-25 PROCEDURE — 3074F SYST BP LT 130 MM HG: CPT

## 2025-02-25 PROCEDURE — 3078F DIAST BP <80 MM HG: CPT

## 2025-02-25 PROCEDURE — 3008F BODY MASS INDEX DOCD: CPT

## 2025-02-25 PROCEDURE — 99213 OFFICE O/P EST LOW 20 MIN: CPT

## 2025-02-26 NOTE — PROGRESS NOTES
Subjective:   Karen Damon is a 59 year old female who presents for Arm Pain (Pt states she noticed a bump on under arm on left side that is tender to the touch and has been having it for 1 week. ).     Patient presents with lump under her left armpit near her left breast that she noticed 1 week ago.  Patient states that the lump is tender to touch.  Patient denies nipple discharge, pain, or redness.  Her last mammogram was within the past year.  Patient is postmenopause.  Patient denies fever, chills, rash, abdominal pain, chest pain, headaches, fatigue, or palpitations         History/Other:      Chief Complaint Reviewed and Verified  Nursing Notes Reviewed and   Verified  Tobacco Reviewed  Allergies Reviewed  Medications Reviewed    Problem List Reviewed  Medical History Reviewed  Surgical History   Reviewed  OB Status Reviewed  Family History Reviewed  Social History   Reviewed           Tobacco:  She has never smoked tobacco.      Current Outpatient Medications   Medication Sig Dispense Refill    cyclobenzaprine 10 MG Oral Tab Take 1 tablet (10 mg total) by mouth 3 (three) times daily as needed for Muscle spasms. 10 tablet 0    CUSTOM MEDICATION Semaglutide/ cyanocobalamin    2.4 mg-  Concentration: 5 mg/ 0.5 mL  Amount: 2.5 ML   Instructions: Inject 48 units / aka 0.48 ML sq q weekly 1 each 5    topiramate 50 MG Oral Tab Take 1 tablet (50 mg total) by mouth 2 (two) times daily. 180 tablet 1    LITFULO 50 MG Oral Cap Take 1 capsule by mouth daily.      spironolactone 50 MG Oral Tab Take 1 tablet (50 mg total) by mouth daily. 90 tablet 3    LINZESS 290 MCG Oral Cap TAKE 1 CAPSULE (290 MCG TOTAL) BY MOUTH EVERY MORNING BEFORE BREAKFAST 90 capsule 3    OLUMIANT 4 MG Oral Tab       albuterol (PROAIR HFA) 108 (90 Base) MCG/ACT Inhalation Aero Soln Inhale 2 puffs into the lungs every 4 (four) hours as needed for Wheezing. 3 each 1    Budesonide-Formoterol Fumarate (SYMBICORT) 80-4.5 MCG/ACT  Inhalation Aerosol Inhale 2 puffs into the lungs 2 (two) times daily. 1 each 2    OFLOXACIN 0.3 % Otic Solution PLACE 5 DROPS INTO BOTH EARS 2 TIMES DAILY FOR 7 DAYS. 5 mL 0    Tofacitinib Citrate 5 MG Oral Tab Take 5 mg by mouth 2 (two) times daily.      Ruxolitinib Phosphate (OPZELURA) 1.5 % External Cream Apply 1 Application topically 2 (two) times a day. 60 g 3    triamcinolone 0.1 % External Cream Apply 1 Application topically 3 (three) times daily. To areas arms and legs as needed for eczema 454 g 2    levocetirizine 5 MG Oral Tab Take 1 tablet (5 mg total) by mouth every evening. 30 tablet 3    triamcinolone 0.5 % External Cream Use bid as directed 60 g 1    albuterol sulfate (2.5 MG/3ML) 0.083% Inhalation Nebu Soln Take 3 mL (2.5 mg total) by nebulization every 4 (four) hours as needed for Wheezing. 50 ampule 3    fexofenadine-pseudoephedrine ER  MG Oral Tablet 12 Hr Take 1 tablet by mouth 2 (two) times daily. (Patient not taking: Reported on 2/25/2025) 30 tablet 0    tacrolimus 0.1 % External Ointment Apply 1 Application topically 2 (two) times daily.      Betamethasone Dipropionate Aug 0.05 % External Cream Apply to worst areas of eczema as needed bid (Patient not taking: Reported on 2/25/2025) 50 g 1       Allergies[1]    Depression Screening (PHQ-2/PHQ-9): Over the LAST 2 WEEKS                         Review of Systems   Constitutional: Negative.  Negative for activity change and fatigue.   HENT: Negative.  Negative for congestion, ear pain, rhinorrhea and sneezing.    Eyes: Negative.  Negative for redness.   Respiratory: Negative.  Negative for cough, shortness of breath and wheezing.    Cardiovascular: Negative.  Negative for chest pain.   Gastrointestinal: Negative.  Negative for abdominal pain, constipation, diarrhea, nausea and vomiting.   Endocrine: Negative.    Genitourinary: Negative.  Negative for difficulty urinating and frequency.   Musculoskeletal: Negative.  Negative for back pain,  joint swelling and myalgias.   Skin: Negative.  Negative for rash.        lump   Allergic/Immunologic: Negative.    Neurological: Negative.  Negative for dizziness, syncope, light-headedness and headaches.   Hematological: Negative.    Psychiatric/Behavioral: Negative.           Objective:   /71 (BP Location: Right arm, Patient Position: Sitting, Cuff Size: large)   Pulse 78   Ht 5' 9\" (1.753 m)   LMP 03/01/2021 (Exact Date)   SpO2 100%   BMI 26.99 kg/m²  Estimated body mass index is 26.99 kg/m² as calculated from the following:    Height as of this encounter: 5' 9\" (1.753 m).    Weight as of 11/21/24: 182 lb 12.8 oz (82.9 kg).      Physical Exam  Vitals and nursing note reviewed.   Constitutional:       Appearance: Normal appearance. She is normal weight.   HENT:      Head: Normocephalic and atraumatic.      Right Ear: Tympanic membrane normal.      Left Ear: Tympanic membrane normal.      Nose: Nose normal.      Mouth/Throat:      Mouth: Mucous membranes are moist.      Pharynx: Oropharynx is clear.   Eyes:      Extraocular Movements: Extraocular movements intact.      Conjunctiva/sclera: Conjunctivae normal.      Pupils: Pupils are equal, round, and reactive to light.   Cardiovascular:      Rate and Rhythm: Normal rate and regular rhythm.      Pulses: Normal pulses.      Heart sounds: Normal heart sounds.   Pulmonary:      Effort: Pulmonary effort is normal.      Breath sounds: Normal breath sounds.   Chest:   Breasts:     Left: Mass present.       Abdominal:      General: Abdomen is flat. Bowel sounds are normal.      Palpations: Abdomen is soft.   Musculoskeletal:         General: Normal range of motion.      Cervical back: Normal range of motion and neck supple.   Skin:     General: Skin is warm and dry.   Neurological:      General: No focal deficit present.      Mental Status: She is alert and oriented to person, place, and time. Mental status is at baseline.   Psychiatric:         Mood and  Affect: Mood normal.         Behavior: Behavior normal.         Thought Content: Thought content normal.         Judgment: Judgment normal.           Assessment & Plan:     Assessment & Plan  Axillary lump, left  Mammogram order given  Orders:    [facility] JANEL 2D+3D DIAGNOSTIC ADDL VWS LEFT (EXT=[id number]); Future     Medication use, effects and side effects discussed in detail with patient. The patient  indicated understanding of the diagnosis and agreed with the plan of care.    Return in about 2 weeks (around 3/11/2025).    JESSICA Browning         [1]   Allergies  Allergen Reactions    Amoxicillin RASH    Bactrim [Sulfamethoxazole W/Trimethoprim] HIVES    Flagyl [Metronidazole] HIVES    Flu Virus Vaccine      Respiratory Problems

## 2025-03-18 ENCOUNTER — E-ADVICE (OUTPATIENT)
Dept: DERMATOLOGY | Age: 60
End: 2025-03-18

## 2025-03-20 ENCOUNTER — HOSPITAL ENCOUNTER (OUTPATIENT)
Dept: ULTRASOUND IMAGING | Facility: HOSPITAL | Age: 60
Discharge: HOME | End: 2025-03-20
Payer: COMMERCIAL

## 2025-03-20 ENCOUNTER — HOSPITAL ENCOUNTER (OUTPATIENT)
Dept: MAMMOGRAPHY | Facility: HOSPITAL | Age: 60
Discharge: HOME | End: 2025-03-20
Payer: COMMERCIAL

## 2025-03-20 DIAGNOSIS — R22.32 AXILLARY LUMP, LEFT: ICD-10-CM

## 2025-03-20 PROCEDURE — 77061 BREAST TOMOSYNTHESIS UNI: CPT

## 2025-03-20 PROCEDURE — 77065 DX MAMMO INCL CAD UNI: CPT

## 2025-03-20 PROCEDURE — 76642 ULTRASOUND BREAST LIMITED: CPT

## 2025-04-05 ENCOUNTER — LAB SERVICES (OUTPATIENT)
Dept: LAB | Age: 60
End: 2025-04-05

## 2025-04-05 ENCOUNTER — OFFICE VISIT (OUTPATIENT)
Dept: DERMATOLOGY | Age: 60
End: 2025-04-05

## 2025-04-05 DIAGNOSIS — L63.0 ALOPECIA TOTALIS: ICD-10-CM

## 2025-04-05 DIAGNOSIS — L63.0 ALOPECIA TOTALIS: Primary | ICD-10-CM

## 2025-04-05 LAB
ALBUMIN SERPL-MCNC: 3.6 G/DL (ref 3.4–5)
ALBUMIN/GLOB SERPL: 1.2 {RATIO} (ref 1–2.4)
ALP SERPL-CCNC: 80 UNITS/L (ref 45–117)
ALT SERPL-CCNC: 18 UNITS/L
AMB EXT QUANTIFERON GOLD: NEGATIVE
ANION GAP SERPL CALC-SCNC: 15 MMOL/L (ref 7–19)
AST SERPL-CCNC: 11 UNITS/L
BILIRUB SERPL-MCNC: 0.8 MG/DL (ref 0.2–1)
BUN SERPL-MCNC: 18 MG/DL (ref 6–20)
BUN/CREAT SERPL: 14 (ref 7–25)
CALCIUM SERPL-MCNC: 9.6 MG/DL (ref 8.4–10.2)
CHLORIDE SERPL-SCNC: 104 MMOL/L (ref 97–110)
CHOLEST SERPL-MCNC: 160 MG/DL
CHOLEST/HDLC SERPL: 2.8 {RATIO}
CO2 SERPL-SCNC: 28 MMOL/L (ref 21–32)
CREAT SERPL-MCNC: 1.27 MG/DL (ref 0.51–0.95)
EGFRCR SERPLBLD CKD-EPI 2021: 49 ML/MIN/{1.73_M2}
FASTING DURATION TIME PATIENT: ABNORMAL H
GLOBULIN SER-MCNC: 3 G/DL (ref 2–4)
GLUCOSE SERPL-MCNC: 81 MG/DL (ref 70–99)
HDLC SERPL-MCNC: 58 MG/DL
LDLC SERPL CALC-MCNC: 86 MG/DL
NONHDLC SERPL-MCNC: 102 MG/DL
POTASSIUM SERPL-SCNC: 3.8 MMOL/L (ref 3.4–5.1)
PROT SERPL-MCNC: 6.6 G/DL (ref 6.4–8.2)
SODIUM SERPL-SCNC: 143 MMOL/L (ref 135–145)
TRIGL SERPL-MCNC: 78 MG/DL

## 2025-04-05 PROCEDURE — 80061 LIPID PANEL: CPT | Performed by: INTERNAL MEDICINE

## 2025-04-05 PROCEDURE — 36415 COLL VENOUS BLD VENIPUNCTURE: CPT | Performed by: DERMATOLOGY

## 2025-04-05 PROCEDURE — 86480 TB TEST CELL IMMUN MEASURE: CPT | Performed by: CLINICAL MEDICAL LABORATORY

## 2025-04-05 PROCEDURE — 80053 COMPREHEN METABOLIC PANEL: CPT | Performed by: INTERNAL MEDICINE

## 2025-04-05 RX ORDER — MINOXIDIL 2.5 MG/1
2.5 TABLET ORAL DAILY
Qty: 30 TABLET | Refills: 5 | Status: SHIPPED | OUTPATIENT
Start: 2025-04-05

## 2025-04-05 RX ORDER — RITLECITINIB 50 MG/1
CAPSULE ORAL
Qty: 30 CAPSULE | Refills: 5 | Status: SHIPPED | OUTPATIENT
Start: 2025-04-05

## 2025-04-07 ENCOUNTER — TELEPHONE (OUTPATIENT)
Dept: FAMILY MEDICINE CLINIC | Facility: CLINIC | Age: 60
End: 2025-04-07

## 2025-04-07 LAB
GAMMA INTERFERON BACKGROUND BLD IA-ACNC: 0.04 IU/ML
M TB IFN-G BLD-IMP: NEGATIVE
M TB IFN-G CD4+ BCKGRND COR BLD-ACNC: 0.02 IU/ML
M TB IFN-G CD4+CD8+ BCKGRND COR BLD-ACNC: 0 IU/ML
MITOGEN IGNF BCKGRD COR BLD-ACNC: 1.94 IU/ML

## 2025-04-07 NOTE — TELEPHONE ENCOUNTER
Patient called, verified Name and . Inquiring if she will be needing a referral to see a nephrologist. States she just did a blood work ordered by her dermatologist and creatinine came back at 1.27. she is concerned that it is getting worse. Lab work from 24 reviewed including PCP's review and recommendation. Last creatinine level 1.28. Patient verbalized understanding, states she will wait until her annual physical in  to address this again. No further questions or concerns at this time.     Jeison Dale DO  2025  7:15 PM CST       Please inform the patient of the following:        Your labs have been reviewed and you continue to show a mild compromising kidney function, but it is stable when compared to previous samples.  Increase your clear water intake.  Your thyroid screen is normal.  You are not anemic.      Future Appointments   Date Time Provider Department Center   6/3/2025  9:20 AM Jeison Dale DO [facility]

## 2025-05-27 ENCOUNTER — HOSPITAL ENCOUNTER (OUTPATIENT)
Age: 60
Discharge: HOME | End: 2025-05-27
Payer: COMMERCIAL

## 2025-05-27 VITALS
RESPIRATION RATE: 18 BRPM | DIASTOLIC BLOOD PRESSURE: 76 MMHG | TEMPERATURE: 98 F | SYSTOLIC BLOOD PRESSURE: 124 MMHG | HEART RATE: 81 BPM | OXYGEN SATURATION: 100 %

## 2025-05-27 DIAGNOSIS — T15.90XA: Primary | ICD-10-CM

## 2025-05-27 PROCEDURE — 99213 OFFICE O/P EST LOW 20 MIN: CPT | Performed by: NURSE PRACTITIONER

## 2025-05-27 RX ORDER — OFLOXACIN 3 MG/ML
2 SOLUTION/ DROPS OPHTHALMIC 4 TIMES DAILY
Qty: 10 ML | Refills: 0 | Status: SHIPPED | OUTPATIENT
Start: 2025-05-27 | End: 2025-06-03

## 2025-05-27 RX ORDER — TETRACAINE HYDROCHLORIDE 5 MG/ML
1 SOLUTION OPHTHALMIC ONCE
Status: COMPLETED | OUTPATIENT
Start: 2025-05-27 | End: 2025-05-27

## 2025-05-27 NOTE — ED INITIAL ASSESSMENT (HPI)
Woke up today with bilateral eye redness, crusty and itching. Per pt. Working with plants yesterday.

## 2025-05-27 NOTE — DISCHARGE INSTRUCTIONS
Follow up with your doctor as needed or ophthalmology given.    Wash hands before and after placing drops/ointment, and often if touching/itching face/eyes.   Use medication as directed. Finish full course x 7 days.    Warm compresses to eyes to help with comfort as needed    GO TO ED for eye pain, vision changes, swelling around eyes, fever and chills.

## 2025-05-27 NOTE — ED PROVIDER NOTES
Patient Seen in: [facility]        History  Chief Complaint   Patient presents with    Eye Problem     Stated Complaint: eye problem    Subjective:   HPI          59 yr old female here for evaluation of bilateral eye irritation, redness that started yesterday. 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 Woke up this morning with irritation and discharge from both eyes. Denies vision changes, eye pain, swelling around eyes, headache, fever. Denies use of contacts or glasses.       Objective:     No pertinent past medical history.            No pertinent past surgical history.              No pertinent social history.            Review of Systems    Positive for stated complaint: eye problem  Other systems are as noted in HPI.  Constitutional and vital signs reviewed.      All other systems reviewed and negative except as noted above.                  Physical Exam    ED Triage Vitals [05/27/25 0803]   /76   Pulse 81   Resp 18   Temp 98.1 °F (36.7 °C)   Temp src Oral   SpO2 100 %   O2 Device None (Room air)       Current Vitals:   Vital Signs  BP: 124/76  Pulse: 81  Resp: 18  Temp: 98.1 °F (36.7 °C)  Temp src: Oral    Oxygen Therapy  SpO2: 100 %  O2 Device: None (Room air)      Right Eye Chart Acuity: 20/20, Uncorrected  Left Eye Chart Acuity: 20/20, Uncorrected      Physical Exam  Vitals and nursing note reviewed.   Constitutional:       General: She is not in acute distress.     Appearance: Normal appearance. She is not ill-appearing, toxic-appearing or diaphoretic.   HENT:      Head: Normocephalic.      Mouth/Throat:      Lips: Pink.      Mouth: Mucous membranes are moist.   Eyes:      General: Lids are normal. Vision grossly intact. Gaze aligned appropriately. No allergic shiner, visual field deficit or scleral icterus.        Right eye: Discharge present. No foreign body or  hordeolum.         Left eye: Discharge present.No foreign body or hordeolum.      Extraocular Movements: Extraocular movements intact.      Right eye: Normal extraocular motion and no nystagmus.      Left eye: Normal extraocular motion and no nystagmus.      Conjunctiva/sclera:      Right eye: Right conjunctiva is injected. No chemosis, exudate or hemorrhage.     Left eye: Left conjunctiva is injected. No chemosis, exudate or hemorrhage.     Pupils: Pupils are equal, round, and reactive to light.      Right eye: No corneal abrasion or fluorescein uptake.      Left eye: No corneal abrasion or fluorescein uptake.      Slit lamp exam:     Right eye: No corneal ulcer.      Left eye: No corneal ulcer.   Cardiovascular:      Rate and Rhythm: Normal rate.   Pulmonary:      Effort: Pulmonary effort is normal. No respiratory distress.   Neurological:      Mental Status: She is alert and oriented to person, place, and time.   Psychiatric:         Mood and Affect: Mood normal.         Behavior: Behavior normal.               ED Course  Labs Reviewed - No data to display                         MDM    59 yr old female here for evaluation of BL eye irritation, itching, drainage since yesterday.    ON exam, pt well appearing otherwise. No periobrital swelling, erythema noted. Pupils perrla intact EOM no nystagmus. BL eyes injected. No chemosis noted. After fluorescein stain, no obvious FB noted, no significant corneal abrasion noted.    Differential diagnoses reflecting the complexity of care include but are not limited to corneal abrasion, FB in eye, corneal injury, conjunctivitis.    Comorbidities that add complexity to management include: none  History obtained by an independent source was from: patient  My independent interpretations of studies include: none  Shared decision making was done by: patient, myself  Discussions of management was done with: patient    Patient is well appearing, non-toxic and in no acute distress.   Vital signs are stable.   Likely debris from working yesterday got in her eyes causing irritation and now discharge this am as it is pushing debris out.  Discussed ofloxacin drop to help healing process, good warm compresses, washing hands.    Ophtho if needed.  All questions answered. Return and ER precautions given.    Counseled: Patient, regarding diagnosis, regarding treatment plan, regarding diagnostic results, regarding prescription, I have discussed with the patient the results of tests, differential diagnosis, and warning signs and symptoms that should prompt immediate return. The patient understands these instructions and agrees to the follow-up plan provided. There is no barriers to learning. Appropriate f/u given. Patient agrees to return for any concerns/ problems/complications.              Medical Decision Making      Disposition and Plan     Clinical Impression:  1. FB eye, unspecified laterality, initial encounter         Disposition:  Discharge  5/27/2025  8:22 am    Follow-up:  Mykel Burgos MD  [address] [facility]  [address]  [facility] [address]  [phone]      optho if needed, As needed          Medications Prescribed:  Discharge Medication List as of 5/27/2025  8:24 AM        START taking these medications    Details   ofloxacin 0.3 % Ophthalmic Solution Place 2 drops into both eyes 4 (four) times daily for 7 days., Normal, Disp-10 mL, R-0                   Supplementary Documentation:

## 2025-06-03 ENCOUNTER — LAB ENCOUNTER (OUTPATIENT)
Dept: LAB | Age: 60
End: 2025-06-03
Attending: FAMILY MEDICINE
Payer: COMMERCIAL

## 2025-06-03 ENCOUNTER — OFFICE VISIT (OUTPATIENT)
Dept: FAMILY MEDICINE CLINIC | Facility: CLINIC | Age: 60
End: 2025-06-03

## 2025-06-03 VITALS
HEIGHT: 69 IN | OXYGEN SATURATION: 98 % | RESPIRATION RATE: 18 BRPM | HEART RATE: 89 BPM | SYSTOLIC BLOOD PRESSURE: 110 MMHG | DIASTOLIC BLOOD PRESSURE: 75 MMHG | TEMPERATURE: 98 F | BODY MASS INDEX: 27 KG/M2

## 2025-06-03 DIAGNOSIS — J45.40 MODERATE PERSISTENT ASTHMA WITHOUT COMPLICATION (HCC): ICD-10-CM

## 2025-06-03 DIAGNOSIS — R94.4 DECREASED GFR: ICD-10-CM

## 2025-06-03 DIAGNOSIS — I10 ESSENTIAL HYPERTENSION: ICD-10-CM

## 2025-06-03 DIAGNOSIS — Z90.5 HISTORY OF RIGHT NEPHRECTOMY: ICD-10-CM

## 2025-06-03 DIAGNOSIS — Z12.31 ENCOUNTER FOR SCREENING MAMMOGRAM FOR BREAST CANCER: ICD-10-CM

## 2025-06-03 DIAGNOSIS — Z00.00 ROUTINE PHYSICAL EXAMINATION: Primary | ICD-10-CM

## 2025-06-03 DIAGNOSIS — Z00.00 ROUTINE PHYSICAL EXAMINATION: ICD-10-CM

## 2025-06-03 LAB
BILIRUB UR QL: NEGATIVE
CLARITY UR: CLEAR
COLOR UR: YELLOW
GLUCOSE UR-MCNC: NORMAL MG/DL
HGB UR QL STRIP.AUTO: NEGATIVE
LEUKOCYTE ESTERASE UR QL STRIP.AUTO: 75
NITRITE UR QL STRIP.AUTO: NEGATIVE
PH UR: 5.5 [PH] (ref 5–8)
PROT UR-MCNC: NEGATIVE MG/DL
SP GR UR STRIP: 1.02 (ref 1–1.03)
UROBILINOGEN UR STRIP-ACNC: NORMAL

## 2025-06-03 PROCEDURE — 3074F SYST BP LT 130 MM HG: CPT | Performed by: FAMILY MEDICINE

## 2025-06-03 PROCEDURE — 3078F DIAST BP <80 MM HG: CPT | Performed by: FAMILY MEDICINE

## 2025-06-03 PROCEDURE — 99396 PREV VISIT EST AGE 40-64: CPT | Performed by: FAMILY MEDICINE

## 2025-06-03 PROCEDURE — 81001 URINALYSIS AUTO W/SCOPE: CPT

## 2025-06-03 PROCEDURE — 3008F BODY MASS INDEX DOCD: CPT | Performed by: FAMILY MEDICINE

## 2025-06-03 NOTE — PROGRESS NOTES
Subjective:     Patient ID: Karen Damon is a 59 year old female.    This patient is an established 59-year-old asthmatic -American female here for complete preventive care physical and for status update on any confirmed chronic medical illnesses and follow up on any previous labs or procedures that were suggestive or in need of further work up. Colonoscopy is current. Bowel and bladder functions are intact.    Patient is currently asymptomatic with regards to asthma.  Asthma action plan and control test have been provided for the patient on today.    Patient is scheduled for pelvic exam to include Pap and HPV testing.          History/Other:   Review of Systems  Current Medications[1]  Allergies:Allergies[2]    Past Medical History[3]   Past Surgical History[4]   Family History[5]   Social History: Short Social Hx on File[6]     Objective:   Physical Exam  Constitutional:       General: She is not in acute distress.     Appearance: Normal appearance. She is not ill-appearing.   HENT:      Right Ear: Tympanic membrane normal.      Left Ear: Tympanic membrane normal.      Nose: Nose normal.      Mouth/Throat:      Mouth: Mucous membranes are moist.   Neck:      Thyroid: No thyromegaly.   Cardiovascular:      Rate and Rhythm: Normal rate and regular rhythm.   Pulmonary:      Effort: Pulmonary effort is normal.      Breath sounds: Normal breath sounds.   Abdominal:      General: Bowel sounds are normal.      Palpations: Abdomen is soft. There is no mass.   Neurological:      Mental Status: She is alert and oriented to person, place, and time.   Psychiatric:         Mood and Affect: Mood normal.         Assessment & Plan:   1. Routine physical examination  The following has been ordered.  - Urinalysis, Routine [E]; Future    2. Decreased GFR  Renal function to be rechecked in 1 month.  - Renal Function Panel [E]; Future    3. Essential hypertension  Blood pressure measures to goal when measured  manually and also electronically.  See patient instructions about spironolactone use.    4. Moderate persistent asthma without complication (HCC)  Asthma action plan to be provided for the patient.  Currently asymptomatic.    5. History of right nephrectomy  History of right nephrectomy for the purpose of donation.    6. Encounter for screening mammogram for breast cancer  Ordered.  - [facility] JANEL 2D+3D SCREENING BILAT (CPT=77067/40321); Future          Orders Placed This Encounter   Procedures    Quantiferon TB Plus    Urinalysis, Routine [E]    Renal Function Panel [E]       Meds This Visit:  Requested Prescriptions      No prescriptions requested or ordered in this encounter       Imaging & Referrals:  None     Patient Instructions   All adult screening ordered and done appropriate for patient's age and gender and risk factors and complaints.  Encouraged physical fitness and daily physical activity daily.  Monitor blood pressures and record at home. Limit salt intake.  Asthma action plan has been provided to the patient.  Patient to be referred to gynecology for Pap smear and pelvic exam.  Recheck on kidney function.    Return in about 1 year (around 6/3/2026), or if symptoms worsen or fail to improve.         [1]   Current Outpatient Medications   Medication Sig Dispense Refill    ofloxacin 0.3 % Ophthalmic Solution Place 2 drops into both eyes 4 (four) times daily for 7 days. 10 mL 0    cyclobenzaprine 10 MG Oral Tab Take 1 tablet (10 mg total) by mouth 3 (three) times daily as needed for Muscle spasms. 10 tablet 0    fexofenadine-pseudoephedrine ER  MG Oral Tablet 12 Hr Take 1 tablet by mouth 2 (two) times daily. (Patient not taking: Reported on 2/25/2025) 30 tablet 0    CUSTOM MEDICATION Semaglutide/ cyanocobalamin    2.4 mg-  Concentration: 5 mg/ 0.5 mL  Amount: 2.5 ML   Instructions: Inject 48 units / aka 0.48 ML sq q weekly 1 each 5    topiramate 50 MG Oral Tab Take 1 tablet (50 mg total) by mouth 2  (two) times daily. 180 tablet 1    LITFULO 50 MG Oral Cap Take 1 capsule by mouth daily.      spironolactone 50 MG Oral Tab Take 1 tablet (50 mg total) by mouth daily. 90 tablet 3    LINZESS 290 MCG Oral Cap TAKE 1 CAPSULE (290 MCG TOTAL) BY MOUTH EVERY MORNING BEFORE BREAKFAST 90 capsule 3    OLUMIANT 4 MG Oral Tab       albuterol (PROAIR HFA) 108 (90 Base) MCG/ACT Inhalation Aero Soln Inhale 2 puffs into the lungs every 4 (four) hours as needed for Wheezing. 3 each 1    Budesonide-Formoterol Fumarate (SYMBICORT) 80-4.5 MCG/ACT Inhalation Aerosol Inhale 2 puffs into the lungs 2 (two) times daily. 1 each 2    OFLOXACIN 0.3 % Otic Solution PLACE 5 DROPS INTO BOTH EARS 2 TIMES DAILY FOR 7 DAYS. 5 mL 0    tacrolimus 0.1 % External Ointment Apply 1 Application topically 2 (two) times daily.      Tofacitinib Citrate 5 MG Oral Tab Take 5 mg by mouth 2 (two) times daily.      Ruxolitinib Phosphate (OPZELURA) 1.5 % External Cream Apply 1 Application topically 2 (two) times a day. 60 g 3    triamcinolone 0.1 % External Cream Apply 1 Application topically 3 (three) times daily. To areas arms and legs as needed for eczema 454 g 2    Betamethasone Dipropionate Aug 0.05 % External Cream Apply to worst areas of eczema as needed bid (Patient not taking: Reported on 2/25/2025) 50 g 1    levocetirizine 5 MG Oral Tab Take 1 tablet (5 mg total) by mouth every evening. 30 tablet 3    triamcinolone 0.5 % External Cream Use bid as directed 60 g 1    albuterol sulfate (2.5 MG/3ML) 0.083% Inhalation Nebu Soln Take 3 mL (2.5 mg total) by nebulization every 4 (four) hours as needed for Wheezing. 50 ampule 3   [2]   Allergies  Allergen Reactions    Amoxicillin RASH    Bactrim [Sulfamethoxazole W/Trimethoprim] HIVES    Flagyl [Metronidazole] HIVES    Flu Virus Vaccine      Respiratory Problems     [3]   Past Medical History:   Abnormal mammogram of right breast    Had a biopsy     Anesthesia complication    difficulty in waking up    Asthma  (HCC)    exercised induced    Claustrophobia    Essential hypertension    Helicobacter positive gastritis    on abx therapy    High blood pressure    History of blood transfusion    2013 at the time of myomectomy.    IBS (irritable bowel syndrome)    constipation    Kidney donor    right    Pulmonary embolism (HCC)    Bilateral PE, symptomatic.  Had myomectomy 1-2 months prior for symptomatic fibroids.  Negative work up for coagulation disorders.  Treated for 1 year.    Tubular adenoma of colon    None in 2024, repeat CLN in 2031   [4]   Past Surgical History:  Procedure Laterality Date    Colonoscopy  2011    Colonoscopy      Colonoscopy N/A 6/11/2024    Procedure: COLONOSCOPY;  Surgeon: JOSEE Lucio MD;  Location: [facility] ENDOSCOPY    Egd  2011    Lasik Bilateral 2010    Needle biopsy right      Nephrectomy Right 1999    Nephrectomy      Other  2015    UFE    Prior myomectomy      Reduction left      Reduction right      Upper gi endoscopy,exam      Uterine fibroid embolization perq w/rad gid     [5]   Family History  Problem Relation Age of Onset    Diabetes Mother     Hypertension Mother     Glaucoma Mother     Prostate Cancer Father     Cancer Father         prostate    Hypertension Father     Glaucoma Father     Cancer Sister         cervical    Hypertension Sister     Heart Disorder Sister     Cancer Sister         cervical    Hypertension Sister     Hypertension Brother     Breast Cancer Maternal Cousin Female         30's    Macular degeneration Neg    [6]   Social History  Socioeconomic History    Marital status: Single    Number of children: 0   Tobacco Use    Smoking status: Never    Smokeless tobacco: Never   Vaping Use    Vaping status: Never Used   Substance and Sexual Activity    Alcohol use: No     Alcohol/week: 0.0 standard drinks of alcohol    Drug use: Never   Other Topics Concern    Pt has a pacemaker No    Pt has a defibrillator No    Reaction to local anesthetic No

## 2025-06-03 NOTE — PATIENT INSTRUCTIONS
All adult screening ordered and done appropriate for patient's age and gender and risk factors and complaints.  Encouraged physical fitness and daily physical activity daily.  Monitor blood pressures and record at home. Limit salt intake.  Asthma action plan has been provided to the patient.  Patient to be referred to gynecology for Pap smear and pelvic exam.  Recheck on kidney function in 1 month after the patient has been off of spironolactone for that month.  Hold spironolactone for now.  Patient to measure her blood pressures 3 times weekly for the next 4 weeks.  Mammogram ordered.

## 2025-06-05 ENCOUNTER — OFFICE VISIT (OUTPATIENT)
Dept: SURGERY | Facility: CLINIC | Age: 60
End: 2025-06-05
Payer: COMMERCIAL

## 2025-06-05 VITALS
OXYGEN SATURATION: 99 % | HEIGHT: 69 IN | DIASTOLIC BLOOD PRESSURE: 70 MMHG | HEART RATE: 93 BPM | WEIGHT: 187 LBS | SYSTOLIC BLOOD PRESSURE: 122 MMHG | BODY MASS INDEX: 27.7 KG/M2

## 2025-06-05 DIAGNOSIS — E88.819 INSULIN RESISTANCE: ICD-10-CM

## 2025-06-05 DIAGNOSIS — E66.3 OVERWEIGHT (BMI 25.0-29.9): ICD-10-CM

## 2025-06-05 DIAGNOSIS — I10 ESSENTIAL HYPERTENSION: Primary | ICD-10-CM

## 2025-06-05 DIAGNOSIS — Z51.81 ENCOUNTER FOR THERAPEUTIC DRUG MONITORING: ICD-10-CM

## 2025-06-05 PROCEDURE — 3074F SYST BP LT 130 MM HG: CPT | Performed by: INTERNAL MEDICINE

## 2025-06-05 PROCEDURE — 3078F DIAST BP <80 MM HG: CPT | Performed by: INTERNAL MEDICINE

## 2025-06-05 PROCEDURE — 3008F BODY MASS INDEX DOCD: CPT | Performed by: INTERNAL MEDICINE

## 2025-06-05 PROCEDURE — 99214 OFFICE O/P EST MOD 30 MIN: CPT | Performed by: INTERNAL MEDICINE

## 2025-06-05 NOTE — PROGRESS NOTES
[facility], [facility], [place]  [address]  [facility] [address]  Dept: [phone]     Patient:  Karen Damon  :      1965  MRN:      [id number]    Chief Complaint:    Chief Complaint   Patient presents with    Follow - Up    Weight Management       SUBJECTIVE     History of Present Illness:  Karen is being seen today for a follow-up for weight management follow up    Past Medical History:   Past Medical History:    Abnormal mammogram of right breast    Had a biopsy     Anesthesia complication    difficulty in waking up    Asthma (HCC)    exercised induced    Claustrophobia    Essential hypertension    Helicobacter positive gastritis    on abx therapy    High blood pressure    History of blood transfusion     at the time of myomectomy.    IBS (irritable bowel syndrome)    constipation    Kidney donor    right    Pulmonary embolism (HCC)    Bilateral PE, symptomatic.  Had myomectomy 1-2 months prior for symptomatic fibroids.  Negative work up for coagulation disorders.  Treated for 1 year.    Tubular adenoma of colon    None in , repeat CLN in         Comorbidities:  Other:  none    OBJECTIVE     Vitals: /70   Pulse 93   Ht 5' 9\" (1.753 m)   Wt 187 lb (84.8 kg)   LMP 2021 (Exact Date)   SpO2 99%   BMI 27.62 kg/m²     Initial weight loss: +05   Total weight loss: -37   Start weight: 224    Wt Readings from Last 3 Encounters:   25 187 lb (84.8 kg)   24 182 lb 12.8 oz (82.9 kg)   10/18/24 184 lb (83.5 kg)       Patient Medications:    Current Outpatient Medications   Medication Sig Dispense Refill    cyclobenzaprine 10 MG Oral Tab Take 1 tablet (10 mg total) by mouth 3 (three) times daily as needed for Muscle spasms. 10 tablet 0    fexofenadine-pseudoephedrine ER  MG Oral Tablet 12 Hr Take 1 tablet by mouth 2 (two) times daily. (Patient not taking: Reported on 2025) 30 tablet 0    CUSTOM  MEDICATION Semaglutide/ cyanocobalamin    2.4 mg-  Concentration: 5 mg/ 0.5 mL  Amount: 2.5 ML   Instructions: Inject 48 units / aka 0.48 ML sq q weekly 1 each 5    topiramate 50 MG Oral Tab Take 1 tablet (50 mg total) by mouth 2 (two) times daily. 180 tablet 1    LITFULO 50 MG Oral Cap Take 1 capsule by mouth daily.      spironolactone 50 MG Oral Tab Take 1 tablet (50 mg total) by mouth daily. 90 tablet 3    LINZESS 290 MCG Oral Cap TAKE 1 CAPSULE (290 MCG TOTAL) BY MOUTH EVERY MORNING BEFORE BREAKFAST 90 capsule 3    OLUMIANT 4 MG Oral Tab       albuterol (PROAIR HFA) 108 (90 Base) MCG/ACT Inhalation Aero Soln Inhale 2 puffs into the lungs every 4 (four) hours as needed for Wheezing. 3 each 1    Budesonide-Formoterol Fumarate (SYMBICORT) 80-4.5 MCG/ACT Inhalation Aerosol Inhale 2 puffs into the lungs 2 (two) times daily. 1 each 2    OFLOXACIN 0.3 % Otic Solution PLACE 5 DROPS INTO BOTH EARS 2 TIMES DAILY FOR 7 DAYS. 5 mL 0    tacrolimus 0.1 % External Ointment Apply 1 Application topically 2 (two) times daily.      Tofacitinib Citrate 5 MG Oral Tab Take 5 mg by mouth 2 (two) times daily.      Ruxolitinib Phosphate (OPZELURA) 1.5 % External Cream Apply 1 Application topically 2 (two) times a day. 60 g 3    triamcinolone 0.1 % External Cream Apply 1 Application topically 3 (three) times daily. To areas arms and legs as needed for eczema 454 g 2    Betamethasone Dipropionate Aug 0.05 % External Cream Apply to worst areas of eczema as needed bid (Patient not taking: Reported on 2/25/2025) 50 g 1    levocetirizine 5 MG Oral Tab Take 1 tablet (5 mg total) by mouth every evening. 30 tablet 3    triamcinolone 0.5 % External Cream Use bid as directed 60 g 1    albuterol sulfate (2.5 MG/3ML) 0.083% Inhalation Nebu Soln Take 3 mL (2.5 mg total) by nebulization every 4 (four) hours as needed for Wheezing. 50 ampule 3     Allergies:  Amoxicillin, Bactrim [sulfamethoxazole w/trimethoprim], Flagyl [metronidazole], and Flu virus  vaccine     Social History:    Social History     Socioeconomic History    Marital status: Single     Spouse name: Not on file    Number of children: 0    Years of education: Not on file    Highest education level: Not on file   Occupational History    Not on file   Tobacco Use    Smoking status: Never    Smokeless tobacco: Never   Vaping Use    Vaping status: Never Used   Substance and Sexual Activity    Alcohol use: No     Alcohol/week: 0.0 standard drinks of alcohol    Drug use: Never    Sexual activity: Not on file   Other Topics Concern    Grew up on a farm Not Asked    History of tanning Not Asked    Outdoor occupation Not Asked    Pt has a pacemaker No    Pt has a defibrillator No    Breast feeding Not Asked    Reaction to local anesthetic No   Social History Narrative    Not on file     Social Drivers of Health     Food Insecurity: Not on file   Transportation Needs: Not on file   Stress: Not on file   Housing Stability: Not on file     Surgical History:    Past Surgical History:   Procedure Laterality Date    Colonoscopy  2011    Colonoscopy      Colonoscopy N/A 6/11/2024    Procedure: COLONOSCOPY;  Surgeon: JOSEE Lucio MD;  Location: [facility] ENDOSCOPY    Egd  2011    Lasik Bilateral 2010    Needle biopsy right      Nephrectomy Right 1999    Nephrectomy      Other  2015    UFE    Prior myomectomy      Reduction left      Reduction right      Upper gi endoscopy,exam      Uterine fibroid embolization perq w/rad gid       Family History:    Family History   Problem Relation Age of Onset    Diabetes Mother     Hypertension Mother     Glaucoma Mother     Prostate Cancer Father     Cancer Father         prostate    Hypertension Father     Glaucoma Father     Cancer Sister         cervical    Hypertension Sister     Heart Disorder Sister     Cancer Sister         cervical    Hypertension Sister     Hypertension Brother     Breast Cancer Maternal Cousin Female         30's    Macular degeneration Neg        Food  Journal  Reviewed and Discussed:       Patient has a Food Journal?: no   Patient is reading nutrition labels?  yes  Average Caloric Intake:     Average CHO Intake: <100  Is patient exercising? yes  Type of exercise? Walking    Eating Habits  Patient states the following:  Eats 3 meal(s) per day  Length of time it takes to consume a meal:  15 min  # of snacks per day: 1-2 Type of snacks:  dark chocolate, raisins  Amount of soda consumption per day:  none  Amount of water (in ounces) per day:  adequate  Drinking between meals only:  no  Toughest challenge:  sweets    Nutritional Goals  Limit carbohydrates to 100 gms per day, Eat 100-200 calories within 1 hour of waking  and Eat 3-4 cups of fresh fruits or vegetables daily    Behavior Modifications Reviewed and Discussed  Eat breakfast, Eat 3 meals per day, Plan meals in advance, Read nutrition labels, Drink 64 oz of water per day, Maintain a daily food journal, No drinking 30 minutes before or after meals, Utlize portion control strategies to reduce calorie intake, Identify triggers for eating and manage cues and Eat slowly and take 20 to 30 minutes to complete each meal    Exercise Goals Reviewed and Discussed    Walk for  45 Minutes    ROS:    Review of Systems   Constitutional: Negative.  Negative for activity change, appetite change and fever.   HENT: Negative.     Respiratory: Negative.  Negative for cough, shortness of breath and wheezing.    Cardiovascular: Negative.  Negative for chest pain, palpitations and leg swelling.   Gastrointestinal: Negative.  Negative for abdominal distention, abdominal pain, constipation, diarrhea, nausea and vomiting.   Genitourinary: Negative.  Negative for dysuria, flank pain and frequency.   Musculoskeletal: Negative.  Negative for back pain and gait problem.   Skin: Negative.    Neurological: Negative.  Negative for dizziness and headaches.   Psychiatric/Behavioral: Negative.         Physical Exam:   Physical Exam  Vitals  reviewed.   Constitutional:       General: She is not in acute distress.     Appearance: She is well-developed.   HENT:      Head: Normocephalic and atraumatic.   Pulmonary:      Effort: Pulmonary effort is normal. No respiratory distress.   Abdominal:      Palpations: Abdomen is soft.   Musculoskeletal:         General: Normal range of motion.      Cervical back: Neck supple.   Skin:     General: Skin is warm and dry.   Neurological:      Mental Status: She is alert and oriented to person, place, and time.   Psychiatric:         Behavior: Behavior normal.         Thought Content: Thought content normal.         Judgment: Judgment normal.       ASSESSMENT     Encounter Diagnosis(ses):   Encounter Diagnoses   Name Primary?    Essential hypertension Yes    Insulin resistance     Encounter for therapeutic drug monitoring     Overweight (BMI 25.0-29.9)        PLAN   Patient is not interested in bariatric surgery. Patient desires to pursue traditional weight loss at this time.       Asthma: stable       Hx of PE: in 2013 after myomectomy; was on Coumadin x 1 year after PEs     Topiramte has been effective for Emmett and decreasing her sweet tooth.    Goals for next month:  1. Keep a food log using [employer]  2. Drink 48-64 ounces of water per day. No fruit juices or regular soda.  3. Increase aerobic exercises (goal is 150 minutes per week)  4. Increase fruit and vegetable servings/day  5. Keep carbs at or below 100g/day  6. Avoid skipping meals  7. Prepare meals and snacks in advance    Tolerating topiramate refill at 50 mg BID  Kidney donor    PHENTERMINE: not tolerating  Higher dose causing insomnia  discontinued  ekg done      Compound semaglutide   Tolerating well    Increased stress with mom. Will observe    John Hahn MD

## 2025-06-16 ENCOUNTER — PATIENT MESSAGE (OUTPATIENT)
Dept: FAMILY MEDICINE CLINIC | Facility: CLINIC | Age: 60
End: 2025-06-16

## 2025-06-18 ENCOUNTER — NURSE TRIAGE (OUTPATIENT)
Dept: FAMILY MEDICINE CLINIC | Facility: CLINIC | Age: 60
End: 2025-06-18

## 2025-06-18 NOTE — TELEPHONE ENCOUNTER
Please let the patient know that if the spironolactone has relieved her symptoms, then she should remain on it.

## 2025-06-18 NOTE — TELEPHONE ENCOUNTER
Please reply to pool: EM RN TRIAGE  Action Requested: Summary for Provider     []  Critical Lab, Recommendations Needed  [x] Need Additional Advice  []   FYI    []   Need Orders  [] Need Medications Sent to Pharmacy  []  Other     SUMMARY: Patient returned call to office.  Thought her message would be sent directly to Dr. Dale.  Advised her of our policy to speak directly with patients when they are reporting symptoms.  Was on spironolactone 50 mg daily.  Experienced foot and ankle swelling and pain since stopping the medication.  See [employer] message for photos. Blood pressure stable 115/78.  Denies numbness or tingling. Denies chest pain or shortness of breath.  States she restarted the spironolactone and swelling and pain have improved.  Inquires on further suggestions from Dr. Dale, does not want to take medication if she does not have to.  Seen in office 06/03.    Reason for call: Edema  Onset: Data Unavailable                       Reason for Disposition   MILD to MODERATE ankle swelling (e.g., can't move joint normally, can't do usual activities)  (Exceptions: Itchy, localized swelling; swelling is chronic.)    Protocols used: Ankle Swelling-A-[place]

## 2025-06-19 ENCOUNTER — OFFICE VISIT (OUTPATIENT)
Dept: OBGYN CLINIC | Facility: CLINIC | Age: 60
End: 2025-06-19

## 2025-06-19 VITALS — SYSTOLIC BLOOD PRESSURE: 113 MMHG | HEART RATE: 80 BPM | DIASTOLIC BLOOD PRESSURE: 76 MMHG

## 2025-06-19 DIAGNOSIS — Z01.419 ENCOUNTER FOR GYNECOLOGICAL EXAMINATION WITHOUT ABNORMAL FINDING: ICD-10-CM

## 2025-06-19 DIAGNOSIS — Z12.4 SCREENING FOR CERVICAL CANCER: Primary | ICD-10-CM

## 2025-06-19 DIAGNOSIS — N95.1 VAGINAL DRYNESS, MENOPAUSAL: ICD-10-CM

## 2025-06-19 DIAGNOSIS — N89.8 VAGINAL ODOR: ICD-10-CM

## 2025-06-19 PROCEDURE — 99212 OFFICE O/P EST SF 10 MIN: CPT | Performed by: OBSTETRICS & GYNECOLOGY

## 2025-06-19 PROCEDURE — 3074F SYST BP LT 130 MM HG: CPT | Performed by: OBSTETRICS & GYNECOLOGY

## 2025-06-19 PROCEDURE — 3078F DIAST BP <80 MM HG: CPT | Performed by: OBSTETRICS & GYNECOLOGY

## 2025-06-19 PROCEDURE — 99396 PREV VISIT EST AGE 40-64: CPT | Performed by: OBSTETRICS & GYNECOLOGY

## 2025-06-19 NOTE — PROGRESS NOTES
The following individual(s) verbally consented to be recorded using ambient AI listening technology and understand that they can each withdraw their consent to this listening technology at any point by asking the clinician to turn off or pause the recording:    Patient name: Karen Damon  Additional names:

## 2025-06-20 DIAGNOSIS — I10 ESSENTIAL HYPERTENSION: ICD-10-CM

## 2025-06-20 LAB — HPV E6+E7 MRNA CVX QL NAA+PROBE: NEGATIVE

## 2025-06-20 NOTE — PROGRESS NOTES
Karen Damon is a 59 year old female  Patient's last menstrual period was 2021 (exact date).    Chief Complaint   Patient presents with    Annual     Reviewed Preventative/Wellness form with patient.     .       History of Present Illness  Karen Damon is a 59 year old female who presents with vaginal odor after intercourse.    She experiences a noticeable and bothersome vaginal odor after intercourse. The odor is not consistently 'fishy' but is distinct and noticeable to her. She is sensitive to smells and finds the odor bothersome enough to use panty liners to manage discharge, which can stain her underwear. She has tried various products, including KY and Uberlube, but the odor persists. She has previously used clindamycin vaginally to address the odor.  Recommended that boric acid suppositories to maintain the vaginal ph.      She discusses her blood pressure management, noting that she attempted to stop her medication, spironolactone, but experienced swelling in her feet. The swelling subsides overnight but returns the next day. She has since resumed the medication. She mentions following dietary changes, such as avoiding carbs and beans, to help manage her condition.    No new gynecological problems aside from the vaginal odor. Reports swelling in feet when off blood pressure medication.       OBSTETRICS HISTORY:  OB History    Para Term  AB Living   1 0 0 0 0 0   SAB IAB Ectopic Multiple Live Births   0 0 0 0 0       GYNE HISTORY:   Hx Prior Abnormal Pap: Yes  Pap Date: 23  Pap Result Notes: Pap ASCUS Neg HPV  Follow Up Recommendation: mammo 3/20/24 Erick Benign // Annual 20 CAP      MEDICAL HISTORY:  Past Medical History:    Abnormal mammogram of right breast    Had a biopsy     Anesthesia complication    difficulty in waking up    Asthma (HCC)    exercised induced    Claustrophobia    Essential hypertension    Helicobacter positive gastritis    on abx  therapy    High blood pressure    History of blood transfusion    2013 at the time of myomectomy.    IBS (irritable bowel syndrome)    constipation    Kidney donor    right    Pulmonary embolism (HCC)    Bilateral PE, symptomatic.  Had myomectomy 1-2 months prior for symptomatic fibroids.  Negative work up for coagulation disorders.  Treated for 1 year.    Tubular adenoma of colon    None in 2024, repeat CLN in 2031     Past Surgical History:   Procedure Laterality Date    Colonoscopy  2011    Colonoscopy      Colonoscopy N/A 6/11/2024    Procedure: COLONOSCOPY;  Surgeon: JOSEE Lucio MD;  Location: [facility] ENDOSCOPY    Egd  2011    Lasik Bilateral 2010    Needle biopsy right      Nephrectomy Right 1999    Nephrectomy      Other  2015    UFE    Prior myomectomy      Reduction left      Reduction right      Upper gi endoscopy,exam      Uterine fibroid embolization perq w/rad gid           SOCIAL HISTORY:  Social History     Socioeconomic History    Marital status: Single    Number of children: 0   Tobacco Use    Smoking status: Never    Smokeless tobacco: Never   Vaping Use    Vaping status: Never Used   Substance and Sexual Activity    Alcohol use: No     Alcohol/week: 0.0 standard drinks of alcohol    Drug use: Never    Sexual activity: Yes   Other Topics Concern    Pt has a pacemaker No    Pt has a defibrillator No    Reaction to local anesthetic No        FAMILY HISTORY:  Family History   Problem Relation Age of Onset    Diabetes Mother     Hypertension Mother     Glaucoma Mother     Prostate Cancer Father     Cancer Father         prostate    Hypertension Father     Glaucoma Father     Cancer Sister         cervical    Hypertension Sister     Heart Disorder Sister     Cancer Sister         cervical    Hypertension Sister     Hypertension Brother     Breast Cancer Maternal Cousin Female         30's    Macular degeneration Neg        MEDICATIONS:    Current Outpatient Medications:     CUSTOM MEDICATION,  Semaglutide/ cyanocobalamin  2.4 mg- Concentration: 5 mg/ 0.5 mL Amount: 2.5 ML  Instructions: Inject 48 units / aka 0.48 ML sq q weekly, Disp: 1 each, Rfl: 5    cyclobenzaprine 10 MG Oral Tab, Take 1 tablet (10 mg total) by mouth 3 (three) times daily as needed for Muscle spasms., Disp: 10 tablet, Rfl: 0    topiramate 50 MG Oral Tab, Take 1 tablet (50 mg total) by mouth 2 (two) times daily., Disp: 180 tablet, Rfl: 1    LITFULO 50 MG Oral Cap, Take 1 capsule by mouth daily., Disp: , Rfl:     spironolactone 50 MG Oral Tab, Take 1 tablet (50 mg total) by mouth daily., Disp: 90 tablet, Rfl: 3    LINZESS 290 MCG Oral Cap, TAKE 1 CAPSULE (290 MCG TOTAL) BY MOUTH EVERY MORNING BEFORE BREAKFAST, Disp: 90 capsule, Rfl: 3    OLUMIANT 4 MG Oral Tab, , Disp: , Rfl:     albuterol (PROAIR HFA) 108 (90 Base) MCG/ACT Inhalation Aero Soln, Inhale 2 puffs into the lungs every 4 (four) hours as needed for Wheezing., Disp: 3 each, Rfl: 1    Budesonide-Formoterol Fumarate (SYMBICORT) 80-4.5 MCG/ACT Inhalation Aerosol, Inhale 2 puffs into the lungs 2 (two) times daily., Disp: 1 each, Rfl: 2    OFLOXACIN 0.3 % Otic Solution, PLACE 5 DROPS INTO BOTH EARS 2 TIMES DAILY FOR 7 DAYS., Disp: 5 mL, Rfl: 0    tacrolimus 0.1 % External Ointment, Apply 1 Application topically 2 (two) times daily., Disp: , Rfl:     Tofacitinib Citrate 5 MG Oral Tab, Take 5 mg by mouth 2 (two) times daily., Disp: , Rfl:     Ruxolitinib Phosphate (OPZELURA) 1.5 % External Cream, Apply 1 Application topically 2 (two) times a day., Disp: 60 g, Rfl: 3    triamcinolone 0.1 % External Cream, Apply 1 Application topically 3 (three) times daily. To areas arms and legs as needed for eczema, Disp: 454 g, Rfl: 2    levocetirizine 5 MG Oral Tab, Take 1 tablet (5 mg total) by mouth every evening., Disp: 30 tablet, Rfl: 3    triamcinolone 0.5 % External Cream, Use bid as directed, Disp: 60 g, Rfl: 1    albuterol sulfate (2.5 MG/3ML) 0.083% Inhalation Nebu Soln, Take 3 mL (2.5 mg  total) by nebulization every 4 (four) hours as needed for Wheezing., Disp: 50 ampule, Rfl: 3    fexofenadine-pseudoephedrine ER  MG Oral Tablet 12 Hr, Take 1 tablet by mouth 2 (two) times daily. (Patient not taking: Reported on 6/19/2025), Disp: 30 tablet, Rfl: 0    Betamethasone Dipropionate Aug 0.05 % External Cream, Apply to worst areas of eczema as needed bid (Patient not taking: Reported on 6/19/2025), Disp: 50 g, Rfl: 1    ALLERGIES:    Allergies   Allergen Reactions    Amoxicillin RASH    Bactrim [Sulfamethoxazole W/Trimethoprim] HIVES    Flagyl [Metronidazole] HIVES    Flu Virus Vaccine      Respiratory Problems         Blood pressure 113/76, pulse 80, last menstrual period 03/01/2021, not currently breastfeeding.    Review of Systems:  Constitutional:  Denies fatigue, night sweats, hot flashes  Eyes:  denies blurred or double vision  Cardiovascular:  denies chest pain or palpitations  Respiratory:  denies shortness of breath  Gastrointestinal:  denies heartburn, abdominal pain, diarrhea or constipation  Genitourinary:  denies dysuria, incontinence, abnormal vaginal discharge, vaginal itching  Musculoskeletal:  denies back pain.  Skin/Breast:  Denies any breast pain, lumps, or discharge.   Neurological:  denies headaches, extremity weakness or numbness.  Psychiatric: denies depression or anxiety.  Endocrine:   denies excessive thirst or urination.  Heme/Lymph:  denies history of anemia, easy bruising or bleeding.    Depression Screening (PHQ-2/PHQ-9): Over the LAST 2 WEEKS   Little interest or pleasure in doing things (over the last two weeks)?: Not at all    Feeling down, depressed, or hopeless (over the last two weeks)?: Not at all    PHQ-2 SCORE: 0           PHYSICAL EXAM:   Constitutional: well developed, well nourished  Head/Face: normocephalic  Neck/Thyroid: thyroid symmetric, no thyromegaly, no nodules, no adenopathy  Lymphatic:no abnormal supraclavicular or axillary adenopathy is noted  Breast:  normal without palpable masses, tenderness, asymmetry, nipple discharge, nipple retraction or skin changes  Respiratory:  lungs clear to auscultation bilaterally  Cardiovascular: regular rate and rhythm, no significant murmur  Abdomen:  soft, nontender, nondistended, no masses  Skin/Hair: no unusual rashes or bruises  Extremities: no edema, no cyanosis  Psychiatric:  Oriented to time, place, person and situation. Appropriate mood and affect    Pelvic Exam:  External Genitalia: normal appearance, hair distribution, and no lesions  Urethral Meatus:  normal in size, location, without lesions and prolapse  Bladder:  No fullness, masses or tenderness  Vagina:  Normal appearance without lesions, no abnormal discharge  Cervix:  Normal without tenderness on motion  Uterus: normal in size, contour, position, mobility, without tenderness  Adnexa: normal without masses or tenderness  Perineum: normal  Anus: no hemorroids     Results  Procedure: Vaginal culture  Description: A vaginal culture was obtained. No signs of bacterial vaginosis were observed. Clear mucus was present.       Assessment & Plan:    Encounter Diagnoses   Name Primary?    Encounter for gynecological examination without abnormal finding     Vaginal odor     Vaginal dryness, menopausal     Screening for cervical cancer Yes       Assessment & Plan  Vaginal odor post-intercourse  Odor post-intercourse suggests bacterial vaginosis. Explained vaginal acidity and semen alkalinity interaction. Advised against partial antibiotic treatments to prevent resistance.  - no abnormal discharge seen on exam for culture.  - Use boric acid suppositories post-intercourse to acidify the vagina and reduce odor.  - Avoid regular use of panty liners.       SBE encouraged  Orders Placed This Encounter   Procedures    Hpv Dna  High Risk , Thin Prep Collect    ThinPrep PAP Smear    THINPREP PAP SMEAR ONLY       Requested Prescriptions      No prescriptions requested or ordered in  this encounter       None

## 2025-06-23 RX ORDER — SPIRONOLACTONE 50 MG/1
50 TABLET, FILM COATED ORAL DAILY
Qty: 90 TABLET | Refills: 3 | Status: SHIPPED | OUTPATIENT
Start: 2025-06-23

## 2025-07-07 ENCOUNTER — HOSPITAL ENCOUNTER (OUTPATIENT)
Dept: MAMMOGRAPHY | Facility: HOSPITAL | Age: 60
Discharge: HOME | End: 2025-07-07
Attending: FAMILY MEDICINE
Payer: COMMERCIAL

## 2025-07-07 DIAGNOSIS — Z12.31 ENCOUNTER FOR SCREENING MAMMOGRAM FOR BREAST CANCER: ICD-10-CM

## 2025-07-07 PROCEDURE — 77063 BREAST TOMOSYNTHESIS BI: CPT | Performed by: RADIOLOGY

## 2025-07-12 DIAGNOSIS — E66.3 OVERWEIGHT (BMI 25.0-29.9): ICD-10-CM

## 2025-07-14 RX ORDER — TOPIRAMATE 50 MG/1
50 TABLET, FILM COATED ORAL 2 TIMES DAILY
Qty: 180 TABLET | Refills: 1 | Status: SHIPPED | OUTPATIENT
Start: 2025-07-14

## 2025-07-16 ENCOUNTER — TELEPHONE (OUTPATIENT)
Dept: DERMATOLOGY | Age: 60
End: 2025-07-16

## 2025-08-06 ENCOUNTER — TELEPHONE (OUTPATIENT)
Dept: DERMATOLOGY | Age: 60
End: 2025-08-06

## 2025-10-04 ENCOUNTER — APPOINTMENT (OUTPATIENT)
Dept: DERMATOLOGY | Age: 60
End: 2025-10-04

## (undated) DIAGNOSIS — S57.81XD CRUSHING INJURY OF RIGHT FOREARM, SUBSEQUENT ENCOUNTER: Primary | ICD-10-CM

## (undated) DIAGNOSIS — T14.8XXA PERIPHERAL NERVE CONTUSION: ICD-10-CM

## (undated) DIAGNOSIS — S40.021D CONTUSION OF RIGHT UPPER EXTREMITY, SUBSEQUENT ENCOUNTER: ICD-10-CM

## (undated) DEVICE — KIT CLEAN ENDOKIT 1.1OZ GOWNX2

## (undated) DEVICE — KIT ENDO ORCAPOD 160/180/190

## (undated) DEVICE — MEDI-VAC NON-CONDUCTIVE SUCTION TUBING 6MM X 1.8M (6FT.) L: Brand: CARDINAL HEALTH

## (undated) DEVICE — DEVICE SPEC RTRVL MYOSURE

## (undated) DEVICE — ENDOSCOPY PACK UPPER: Brand: MEDLINE INDUSTRIES, INC.

## (undated) DEVICE — 60 ML SYRINGE REGULAR TIP: Brand: MONOJECT

## (undated) DEVICE — SOL  .9 3000ML

## (undated) DEVICE — SOL  .9 1000ML BTL

## (undated) DEVICE — MYOSURE SINGLE USE SEAL SET

## (undated) DEVICE — ENCORE® LATEX ACCLAIM SIZE 6.5, STERILE LATEX POWDER-FREE SURGICAL GLOVE: Brand: ENCORE

## (undated) DEVICE — SET TUBI Y FL CNTRL INFL/OTFL

## (undated) DEVICE — Device: Brand: DUAL NARE NASAL CANNULAE FEMALE LUER CON 7FT O2 TUBE

## (undated) DEVICE — STERILE SURGICAL LUBRICANT, METAL TUBE: Brand: SURGILUBE

## (undated) DEVICE — Device: Brand: DEFENDO AIR/WATER/SUCTION AND BIOPSY VALVE

## (undated) DEVICE — FORCEP RADIAL JAW 4

## (undated) DEVICE — SOCK CNSTR 4IN TNPSL UNV SPEC

## (undated) DEVICE — HYSTEROSCOPY: Brand: MEDLINE INDUSTRIES, INC.

## (undated) NOTE — LETTER
8/1/2022          To Whom It May Concern:    Cheo Campuzano is currently under my medical care and may return to work with the following restrictions: no lift more than 35 lbs for 6 weeks. If you require additional information please contact our office.         Sincerely,    Manish Rowan MD

## (undated) NOTE — MR AVS SNAPSHOT
[address] [address]  [phone]               Thank you for choosing us for your health care visit with Nurse.   We are glad to serve you and happy to provide you with this summary of your vis Commonly known as:  TEMOVATE           Doxepin HCl 5 % Crea   Apply 1 Application topically 3 (three) times daily. For itchy spot   Commonly known as:  ZONALON           furosemide 40 MG Tabs   Take 40 mg by mouth daily.    Commonly known as:  LASIX Visit [facility] online at  [email or web address]

## (undated) NOTE — MR AVS SNAPSHOT
[facility]  [address] [place] [address], [address] [place] [address]  [address] [place] [address] [phone]               Thank you for choosing us for your health care visit with Crista Mane MD.  We are glad to serve you and happy to provide you with this summary of y insurance company's guidelines for prior authorization for this test.  You may be held responsible for payment in full if proper authorization is not acquired.   Please contact the Patient Business Office at [phone] if you have any questions related to You can access your MyChart to more actively manage your health care and view more details from this visit by going to [email or web address].   If you've recently had a stay at the Hospital you can access your discharge instructions in Julissa Alfaro by ragini

## (undated) NOTE — MR AVS SNAPSHOT
[address] [address]  [phone]               Thank you for choosing us for your health care visit with Nurse.   We are glad to serve you and happy to provide you with this summary of your vis Medications Administered in the Office Today     cyanocobalamin (VITAMIN B12) 1000 MCG/ML injection 1,000 mcg                    MyChart     Visit MyChart  You can access your MyChart to more actively manage your health care and view more details from this

## (undated) NOTE — LETTER
[address], [facility]   Date:   9/30/2021     Name:   Leonarda Cabrera    YOB: 1965   MRN:   [id number]       WHERE IS YOUR PAIN NOW?   Luan Yip the areas on your body where you feel the shelbi

## (undated) NOTE — LETTER
3/5/2019          To Whom It May Concern:    Randall Perez is currently under my medical care and may not return to work at this time. She may return to work on 3/7/2019. If you require additional information please contact our office.         Si

## (undated) NOTE — LETTER
AUTHORIZATION FOR SURGICAL OPERATION OR OTHER PROCEDURE    1.  I hereby authorize Dr. Ye Bray, and [facility] staff assigned to my case to perform the following operation and/or procedure at the [facility]:    _______________________________ Time:  ________ A. M.  P.M.        Patient Name:  ______________________________________________________  (please print)      Patient signature:  ___________________________________________________             Relationship to Patient:

## (undated) NOTE — LETTER
[address], [facility]   Date:   9/30/2021     Name:   Becky Pichardo    YOB: 1965   MRN:   [id number]       WHERE IS YOUR PAIN NOW?   Chelsea Cord[place] the areas on your body where you feel the shelbi

## (undated) NOTE — LETTER
4/5/2022          To Whom It May Concern:      Emmett Wade is currently under my medical care and has returned to work light duty effective 3/30/2022 with limited use of right hand. Work restrictions: She may not use right hand for more than 2 hours continuously. No lifting over 8 pounds. Fidencio Ko should continue to work light duty as above until she is evaluated by the orthopedist on 4/26/22. If you require additional information please contact our office.         Sincerely,    Oral Kaminski MD          Document generated by:  Oral Kaminski MD

## (undated) NOTE — LETTER
4/5/2022              [address] [place] [address]         Dear Tracie Angelucci,      Sincerely,    Dong Logan MD  [facility] [id number] [phone]        Document electronically generated by:  Dong Logan MD

## (undated) NOTE — Clinical Note
Dear Rocio Schneider,    Thank you for sending Johan Moctezuma to see me for physiatry consultation. I appreciate your confidence in me to care for your patients. Please feel free call me with any questions at [phone] or contact me through Tr.     Sincerely,

## (undated) NOTE — MR AVS SNAPSHOT
After Visit Summary   9/19/2020    Yandy Gomez    MRN: [id number]           Visit Information     Date & Time  9/19/2020  9:00 AM Provider  Clovis Dias MD [facility], [address] [place] [address],[address], [facility]  Phone HPV HIGH RISK , THIN PREP COLLECTION [[id number] CUSTOM]  9/19/2020 9/19/2021    THINPREP PAP SMEAR B [[id number] CUSTOM]  9/19/2020 9/19/2021                [facility] now offers Video Visits through [address] for adult and pediatric patients.   Video Visits are availab [place]   Monday – Friday  10:00 am – 10:00 pm   Saturday – Sunday  10:00 am – 4:00 pm     [address]   Monday – Friday  4:00 pm – 10:00 pm   Saturday – Sunday  10:00 am – 4:00 pm  WALK-IN CARE  E

## (undated) NOTE — LETTER
[address], [facility]   Date:   9/30/2021     Name:   Marlys Arriola    YOB: 1965   MRN:   [id number]       WHERE IS YOUR PAIN NOW?   [place] the areas on your body where you feel the shelbi

## (undated) NOTE — LETTER
ASTHMA ACTION PLAN for Denis Mendoza     : 1965     Date: 17  Doctor:  Dayo Sharif MD  Phone for doctor or clinic: [address], [address] [place] [address] [phone]           ACT Goal: 20 Don't forget:  · Rinse mouth after using inhaler  · Use spacer for inhaler  · Remember to get your Flu vaccine every fall! Asthma Action Plan reviewed with the caregiver and patient, and a copy of the plan was given to the patient/caregiver.     As

## (undated) NOTE — MR AVS SNAPSHOT
[facility]  [address] [place] [address], [address] [place] [address]               Thank you for choosing us for your health care visit with Karla Rocha MD.  We are glad to serve you and happy to provide you with this summary of These medications were sent to [facility], [place] - [address]     Phone:  [phone]    - Clobetasol Propionate 0.05 % Crea  - Doxepin HCl 5 % Crea

## (undated) NOTE — LETTER
ASTHMA ACTION PLAN for Karen Damon     : 1965     Date: 25  Doctor:  eJison Dale DO  Phone for doctor or clinic: [facility], [address] [address] [phone]      ACT Score: 25    ACT Goal: 20 or greater    Call your provider if you require your rescue/quick reliever medication more than 2-3 times in a 24 hour period.    If you require your rescue inhaler/medication more than 2-3 times weekly, your asthma may not be under proper control and you should seek medical attention.    *Quick Relievers are Xopenex and Albuterol*    You can use the colors of a traffic light to help learn about your asthma medicines.  Therapy Range       1. Green - Go! % of Personal Best Peak Flow   Use controller medicine.   Breathing is good  No cough or wheeze  Can work and play Medicine How much to take When to take it    Medications       Sympathomimetics Instructions     albuterol (PROAIR HFA) 108 (90 Base) MCG/ACT Inhalation Aero Soln Inhale 2 puffs into the lungs every 4 (four) hours as needed for Wheezing.     Budesonide-Formoterol Fumarate (SYMBICORT) 80-4.5 MCG/ACT Inhalation Aerosol Inhale 2 puffs into the lungs 2 (two) times daily.     albuterol sulfate (2.5 MG/3ML) 0.083% Inhalation Nebu Soln Take 3 mL (2.5 mg total) by nebulization every 4 (four) hours as needed for Wheezing.                    2. Yellow - Caution. 50-79% Personal Best Peak Flow  Use reliever medicine to keep an asthma attack from getting bad.   Cough  Quick Relievers  Wheezing  Tight Chest  Wake up at night Medicine How much to take When to take it    If symptoms are not improving in 24-48 hrs, call office for further instructions  Medications       Sympathomimetics Instructions     albuterol (PROAIR HFA) 108 (90 Base) MCG/ACT Inhalation Aero Soln Inhale 2 puffs into the lungs every 4 (four) hours as needed for Wheezing.     Budesonide-Formoterol  Fumarate (SYMBICORT) 80-4.5 MCG/ACT Inhalation Aerosol Inhale 2 puffs into the lungs 2 (two) times daily.     albuterol sulfate (2.5 MG/3ML) 0.083% Inhalation Nebu Soln Take 3 mL (2.5 mg total) by nebulization every 4 (four) hours as needed for Wheezing.                    3. Red - Stop! Danger! <50% Personal Best Peak Flow  Continue Controller Medications But ADD:   Medicine not helping  Breathing is hard and fast  Nose opens wide  Can't walk  Ribs show  Can't talk well Medicine How much to take When to take it    If your symptoms do not improve in ONE hour -  go to the emergency room or call 911 immediately! If symptoms improve, call office for appointment immediately.    Albuterol inhaler 2 puffs every 20 minutes for three treatments       Don't forget:  Rinse mouth after using inhaler  Use spacer for inhaler  Remember to get your Flu vaccine every fall!    [x] Asthma Action Plan reviewed with the caregiver and patient, and a copy of the plan was given to the patient/caregiver.   [] Asthma Action Plan reviewed with the caregiver and patient on the phone, and copy mailed to patient/caregiver or sent via [employer].     Signatures:     Provider  Jeison Dale, DO Patient  Karen Tracee Damon Caretaker

## (undated) NOTE — LETTER
9/13/2022          To Whom It May Concern:    Milly Louise is currently under my medical care and may not do chest compressions, BLS and ACLS classes for 6 weeks. If you require additional information please contact our office.         Sincerely,    Yas Fay MD

## (undated) NOTE — ED AVS SNAPSHOT
[facility]   MRN: [id number]    Department:  [facility] Emergency Department   Date of Visit:  9/6/2019           Disclosure     Insurance plans vary and the physician(s) referred by the ER may not be covered by your plan.  Please contac CARE PHYSICIAN AT ONCE OR RETURN IMMEDIATELY TO THE EMERGENCY DEPARTMENT. If you have been prescribed any medication(s), please fill your prescription right away and begin taking the medication(s) as directed.   If you believe that any of the medications

## (undated) NOTE — ED AVS SNAPSHOT
[facility] in [place] [address]    Phone:  [phone]   MRN: [id number]    Department:  [facility]   Date of Visit:  5/29/2017 What changed: This medication is already on your medication list.  Do NOT take both doses of the new and old medication. ONLY take the dose prescribed today.             Where to Get Your Medications      You can get these medications from any pharmacy to your personal doctor) about any new or lasting problems. The primary care or specialist physician may see patients referred from the [facility] Care. Follow-up care is at the discretion of that Physician.   If you need additiona [address] [facility] ([address] [place] [address] Avenue,[address]Th Floor) [address] ([facility]) [phone]   [facility] [address] ([place] [address]) [address] [facility] [address] Iker Garcia  (

## (undated) NOTE — LETTER
11/15/2019              [address] [facility] [address]         Dear Diamond Blackburn,      It was a pleasure to see you at our [address] [place] [address], [facility] office.   Your Gc/chlamydia culture

## (undated) NOTE — LETTER
3/21/2022              [address] [place] [address]         Dear Liss Knutson,      Sincerely,    Zhou Vyas MD  [place]  [facility] [address]  [phone]        Document electronically generated by:  Zhou Vyas MD

## (undated) NOTE — LETTER
10/24/18        [address] [place] [address]      Dear Christina Wright,    [facility] records indicate that you have outstanding lab work and or testing that was ordered for you and has not yet been completed:  Orders Placed This Encounter

## (undated) NOTE — LETTER
5/31/2022          To Whom It May Concern:    Gold Rivera is currently under my medical care and may return to work with the following restrictions: she may not use right hand for more than 2 hours continuously, no liffting over 20 pounds for the next 4 weeks. If you require additional information please contact our office.         Sincerely,    Gayel Avalos MD

## (undated) NOTE — LETTER
Date & Time: 7/26/2022, 1:55 PM  Patient: Tab James  Encounter Provider(s):    Natalie Marquez       To Whom It May Concern:    Cuco Dela Cruz was seen and treated in our department on 7/26/2022. She may return to work on 8/1/2022.     If you have any questions or concerns, please do not hesitate to call.        _____________________________  Physician/APC Signature

## (undated) NOTE — LETTER
10/29/21          Minna Luis  :  1965      To Whom It May Concern: This patient was seen in our office on 10/29/21 .       Work Duty Status:   Work Related: Yes  MMI:No  Work Status: Full duty               Return to Work Date: 10/29/2021

## (undated) NOTE — LETTER
ASTHMA ACTION PLAN for Hossein Urias     : 1965     Date: 19  Doctor:  Esperanza Wong MD  Phone for doctor or clinic: [address] [place] [address], [address] [place] [id number] [phone]      ACT Score: 25 Albuterol inhaler 2 puffs every 20 minutes for three treatments       Don't forget:  · Rinse mouth after using inhaler  · Use spacer for inhaler  · Remember to get your Flu vaccine every fall!     [x] Asthma Action Plan reviewed with the caregiver and jayla

## (undated) NOTE — ED AVS SNAPSHOT
Lucrecia Gonzales   MRN: [id number]    Department:  [facility] Emergency Department   Date of Visit:  9/5/2019           Disclosure     Insurance plans vary and the physician(s) referred by the ER may not be covered by your plan.  Please contac CARE PHYSICIAN AT ONCE OR RETURN IMMEDIATELY TO THE EMERGENCY DEPARTMENT. If you have been prescribed any medication(s), please fill your prescription right away and begin taking the medication(s) as directed.   If you believe that any of the medications

## (undated) NOTE — LETTER
21          Dian Kyle  :  1965      To Whom It May Concern: This patient was seen in our office on 21 .         Sincerely,        Cameron Shearer MD

## (undated) NOTE — LETTER
[address]  [address] [address]  Dept: [phone]      May 29, 2017    Patient: Thomas Camera   Date of Visit: 5/29/2017       To Whom It May Concern:    April Hock was seen and treated in our I

## (undated) NOTE — MR AVS SNAPSHOT
[address] [address]  [phone]               Thank you for choosing us for your health care visit with Karo Fernandez MD.  We are glad to serve you and happy to provide you with this summary [place], [address], [address] [facility]  [address]  [address] [place] [address]    [address]  [address], [place]    It is the patient's responsibility to check [facility] [address] [phone], [phone]  [address] [place] [address], [address] [place] [address]     Phone:  [phone]    - Albuterol Sulfate  (90 Base) MCG/ACT Aers            MyChart     Visit MyChart  You can access your MyChart to more actively m increments are effective and add up over the week   2 ½ hours per week – spread out over time Use a jerald to keep you motivated   Don’t forget strength training with weights and resistance Set goals and track your progress   You don’t need to join a gym.

## (undated) NOTE — LETTER
AUTHORIZATION FOR SURGICAL OPERATION OR OTHER PROCEDURE    1.  I hereby authorize Dr. George Grey  and [place] [email or web address] staff assigned to my case to perform the following operation and/[email or web address]:    ENDOMETRIAL BIOPSY      ___ _ANDERSON,LAWANDA_____________________________________________________  (please print)      Patient signature:  ___________________________________________________             Relationship to Patient:           []  Parent    Responsible person

## (undated) NOTE — LETTER
[facility]  Administration of Anesthesia  I, Karen Damon agree to be cared for by a physician anesthesiologist alone and/or with a nurse anesthetist, who is specially trained to monitor me and give me medicine to put me to sleep or keep me comfortable during my procedure    I understand that my anesthesiologist and/or anesthetist is not an employee or agent of [facility] or [employer]. He or she works for [facility]    As the patient asking for anesthesia services, I agree to:  Allow the anesthesiologist (anesthesia doctor) to give me medicine and do additional procedures as necessary. Some examples are: Starting or using an “IV” to give me medicine, fluids or blood during my procedure, and having a breathing tube placed to help me breathe when I’m asleep (intubation). In the event that my heart stops working properly, I understand that my anesthesiologist will make every effort to sustain my life, unless otherwise directed by [facility] Do Not Resuscitate documents.  Tell my anesthesia doctor before my procedure:  If I am pregnant.  The last time that I ate or drank.  iii. All of the medicines I take (including prescriptions, herbal supplements, and pills I can buy without a prescription (including street drugs/illegal medications). Failure to inform my anesthesiologist about these medicines may increase my risk of anesthetic complications.  iv.If I am allergic to anything or have had a reaction to anesthesia before.  I understand how the anesthesia medicine will help me (benefits).  I understand that with any type of anesthesia medicine there are risks:  The most common risks are: nausea, vomiting, sore throat, muscle soreness, damage to my eyes, mouth, or teeth (from breathing tube placement).  Rare risks include: remembering what happened during my procedure, allergic reactions to medications, injury to my airway, heart, lungs, vision, nerves,  or muscles and in extremely rare instances death.  My doctor has explained to me other choices available to me for my care (alternatives).  Pregnant Patients (“epidural”):  I understand that the risks of having an epidural (medicine given into my back to help control pain during labor), include itching, low blood pressure, difficulty urinating, headache or slowing of the baby’s heart. Very rare risks include infection, bleeding, seizure, irregular heart rhythms and nerve injury.  Regional Anesthesia (“spinal”, “epidural”, & “nerve blocks”):  I understand that rare but potential complications include headache, bleeding, infection, seizure, irregular heart rhythms, and nerve injury.    _____________________________________________________________________________  Patient (or Representative) Signature/Relationship to Patient  Date   Time    _____________________________________________________________________________   Name (if used)    Language/Organization   Time    _____________________________________________________________________________  Nurse Anesthetist Signature     Date   Time  _____________________________________________________________________________  Anesthesiologist Signature     Date   Time  I have discussed the procedure and information above with the patient (or patient’s representative) and answered their questions. The patient or their representative has agreed to have anesthesia services.    _____________________________________________________________________________  Witness        Date   Time  I have verified that the signature is that of the patient or patient’s representative, and that it was signed before the procedure  Patient Name: Karen Damon     : 1965                 Printed: 2024 at 8:30 AM    Medical Record #: [id number]                                            Page 1 of 1  ----------ANESTHESIA CONSENT----------

## (undated) NOTE — LETTER
3/21/2022          To Whom It May Concern:    Suni Dao is currently under my medical care and may not return to work at this time. Please excuse Tyler Zelaya for 3 weeks. She may return to work on 4/11/2022. If you require additional information please contact our office.         Sincerely,    Katerin Willis MD          Document generated by:  Katerin Willis MD

## (undated) NOTE — LETTER
[address]  Authorization for Surgical Operation and Procedure                                                                                           I hereby authorize DR. Tonita Seip, my physician and his/her assistants (if applicable), which may include medical students, residents, and/or fellows, to perform the following surgical operation/ procedure and administer such anesthesia as may be determined necessary by my physician: STEREOTACTIC GUIDED BIOPSY WITH TOMOSYNTHESIS OF THE RIGHT BREAST WITH CLIP PLACEMENT on [address]   2. I recognize that during the surgical operation/procedure, unforeseen conditions may necessitate additional or different procedures than those listed above. I, therefore, further authorize and request that the above-named surgeon, assistants, or designees perform such procedures as are, in their judgment, necessary and desirable. 3.   My surgeon/physician has discussed prior to my surgery the potential benefits, risks and side effects of this procedure; the likelihood of achieving goals; and potential problems that might occur during recuperation. They also discussed reasonable alternatives to the procedure, including risks, benefits, and side effects related to the alternatives and risks related to not receiving this procedure. I have had all my questions answered and I acknowledge that no guarantee has been made as to the result that may be obtained. 4.   Should the need arise during my operation/procedure, which includes change of level of care prior to discharge, I also consent to the administration of blood and/or blood products. Further, I understand that despite careful testing and screening of blood or blood products by collecting agencies, I may still be subject to ill effects as a result of receiving a blood transfusion and/or blood products.   The following are some, but not all, of the potential risks that can occur: fever and allergic reactions, hemolytic reactions, transmission of diseases such as Hepatitis, AIDS and Cytomegalovirus (CMV) and fluid overload. In the event that I wish to have an autologous transfusion of my own blood, or a directed donor transfusion, I will discuss this with my physician. Check only if Refusing Blood or Blood Products  I understand refusal of blood or blood products as deemed necessary by my physician may have serious consequences to my condition to include possible death. I hereby assume responsibility for my refusal and release the hospital, its personnel, and my physicians from any responsibility for the consequences of my refusal.    o  Refuse   5. I authorize the use of any specimen, organs, tissues, body parts or foreign objects that may be removed from my body during the operation/procedure for diagnosis, research or teaching purposes and their subsequent disposal by hospital authorities. I also authorize the release of specimen test results and/or written reports to my treating physician on the hospital medical staff or other referring or consulting physicians involved in my care, at the discretion of the Pathologist or my treating physician. 6.   I consent to the photographing or videotaping of the operations or procedures to be performed, including appropriate portions of my body for medical, scientific, or educational purposes, provided my identity is not revealed by the pictures or by descriptive texts accompanying them. If the procedure has been photographed/videotaped, the surgeon will obtain the original picture, image, videotape or CD. The hospital will not be responsible for storage, release or maintenance of the picture, image, tape or CD.    7.   I consent to the presence of a  or observers in the operating room as deemed necessary by my physician or their designees.     8.   I recognize that in the event my procedure results in extended X-Ray/fluoroscopy time, I may develop a skin reaction. 9. If I have a Do Not Attempt Resuscitation (DNAR) order in place, that status will be suspended while in the operating room, procedural suite, and during the recovery period unless otherwise explicitly stated by me (or a person authorized to consent on my behalf). The surgeon or my attending physician will determine when the applicable recovery period ends for purposes of reinstating the DNAR order. 10. Patients having a sterilization procedure: I understand that if the procedure is successful the results will be permanent and it will therefore be impossible for me to inseminate, conceive, or bear children. I also understand that the procedure is intended to result in sterility, although the result has not been guaranteed. 11. I acknowledge that my physician has explained sedation/analgesia administration to me including the risk and benefits I consent to the administration of sedation/analgesia as may be necessary or desirable in the judgment of my physician. I CERTIFY THAT I HAVE READ AND FULLY UNDERSTAND THE ABOVE CONSENT TO OPERATION and/or OTHER PROCEDURE.     _________________________________________ _________________________________     ___________________________________  Signature of Patient     Signature of Responsible Person                   Printed Name of Responsible Person                              _________________________________________ ______________________________        ___________________________________  Signature of Witness         Date  Time         Relationship to Patient    STATEMENT OF PHYSICIAN My signature below affirms that prior to the time of the procedure; I have explained to the patient and/or his/her legal representative, the risks and benefits involved in the proposed treatment and any reasonable alternative to the proposed treatment.  I have also explained the risks and benefits involved in refusal of the proposed treatment and alternatives to the proposed treatment and have answered the patient's questions.  If I have a significant financial interest in a co-management agreement or a significant financial interest in any product or implant, or other significant relationship used in this procedure/surgery, I have disclosed this and had a discussion with my patient.     _______________________________________________________________ _____________________________  Raul Swan Physician)                                                                                         (Date)                                   (Time)  Patient Name: Toby Sanford    : 1965   Printed: 2023      Medical Record #: [id number]                                              Page 1 of 1

## (undated) NOTE — ED AVS SNAPSHOT
Jua nFrancisco Givens   MRN: [id number]    Department:  [facility] Emergency Department   Date of Visit:  6/2/2018           Disclosure     Insurance plans vary and the physician(s) referred by the ER may not be covered by your plan.  Please contac CARE PHYSICIAN AT ONCE OR RETURN IMMEDIATELY TO THE EMERGENCY DEPARTMENT. If you have been prescribed any medication(s), please fill your prescription right away and begin taking the medication(s) as directed.   If you believe that any of the medications

## (undated) NOTE — LETTER
AUTHORIZATION FOR SURGICAL OPERATION OR OTHER PROCEDURE    1. I hereby authorize Dr. Deandra Caal, and [facility] staff assigned to my case to perform the following operation and/or procedure at the [facility]:         Colposcopy    2.   My physician Relationship to Patient:           []  Parent    Responsible person                          []  Spouse  In case of minor or                    [] Other  _____________   Incompetent name:  __________________________________________________

## (undated) NOTE — MR AVS SNAPSHOT
[facility] SPECIALTY [facility] [address] [address] [address] [id number] [phone]               Thank you for choosing us for your health care visit with Dani Wright MD.  We are glad to serve you and happy to provide you with this summary o you have any questions related to insurance coverage.          Reason for Today's Visit     Urgent Care F/u           Medical Issues Discussed Today     CAP (community acquired pneumonia)    -  Primary      Instructions and Information about Your Health Commonly known as:  PHENERGAN with CODEINE           Spacer/Aero Chamber Mouthpiece Misc   Use with albuterol inhaler           spironolactone 50 MG Tabs   Take 1 tablet by mouth daily.    Commonly known as:  ALDACTONE           TraMADol HCl 50 MG Tabs   Ta

## (undated) NOTE — LETTER
Date & Time: 12/19/2020, 2:35 PM  Patient: Leonarda Cabrera  Encounter Provider(s):    Franchesca Hodges MD       To Whom It May Concern:    Ray Wnag was seen and treated in our department on 12/19/2020.  She should not return to work until

## (undated) NOTE — LETTER
ASTHMA ACTION PLAN for Yandy Gomez     : 1965     Date: 18  Doctor:  Shavonne Ulloa MD  Phone for doctor or clinic: [address], [address] [place] [address] [phone]           ACT Goal: 20 or call 911 immediately! If symptoms improve, call office for appointment immediately.     Albuterol inhaler 2 puffs every 20 minutes for three treatments       Don't forget:  · Rinse mouth after using inhaler  · Use spacer for inhaler  · Remember to get yo

## (undated) NOTE — LETTER
Date & Time: 1/6/2020, 9:15 AM  Patient: Leonarda Rick  Encounter Provider(s):    Medardo Onofre MD       To Whom It May Concern:    Ray Wang was seen and treated in our department on 1/6/2020.  She should not return to work until 01/08/

## (undated) NOTE — LETTER
4/26/2022          To Whom It May Concern:    Chika Brower is currently under my medical care and may  return to work with the following restrictions: she may not use right hand for more than 2 hours continuously, no liffting over 8 pounds for the next 4 weeks. If you require additional information please contact our office.         Sincerely,    Roxann Delgado MD

## (undated) NOTE — LETTER
Date & Time: 3/16/2022, 11:06 AM  Patient: Sb Velazco  Encounter Provider(s):    Tanesha Ibrahim PA-C       To Whom It May Concern:    Kaila Terrell was seen and treated in our department on 3/16/2022. She should not return to work until seen by occupational health in the next 1-3 business days.     If you have any questions or concerns, please do not hesitate to call.        _____________________________  Physician/APC Signature

## (undated) NOTE — LETTER
9/18/2018          To Whom It May Concern:    Loretta Arias is currently under my medical care and may not return to work at this time. Please excuse Lennis Client for 2 days. She may return to work on 9/20/2018.     If you require additional information

## (undated) NOTE — ED AVS SNAPSHOT
Durga Mendoza   MRN: [id number]    Department:  [facility] Emergency Department   Date of Visit:  3/20/2019           Disclosure     Insurance plans vary and the physician(s) referred by the ER may not be covered by your plan.  Please conta CARE PHYSICIAN AT ONCE OR RETURN IMMEDIATELY TO THE EMERGENCY DEPARTMENT. If you have been prescribed any medication(s), please fill your prescription right away and begin taking the medication(s) as directed.   If you believe that any of the medications

## (undated) NOTE — LETTER
To Whom It May Concern: This certifies that Albin De La Torre was seen in my office today. Please excuse her from work today. Do not hesitate to call with any questions or concerns. Sincerely,    Matthew Postal.  Jean Lopez MD  [facility] MEDICAL [facility], [address] [place] [address]  [address]  [address] [facility] [address] [phone]

## (undated) NOTE — LETTER
Patient Name: Karen Damon  : 1965  MRN: [id number]  Patient Address: [address] [place] [address]      COVID-19 2024      [facility] is committed to the safety and well-being of our patients, visitors, employees, and communities. Please review this entire document. It includes information related to pending tests, positive results and aftercare.    Patients with pending COVID-19 test results should follow all care and home isolation instructions. An [facility] representative will call with your test results. Results will also be available on [employer].    Home Isolation    If you have tested positive for COVID-19 OR if you are sick and suspect that you have COVID-19 but do not yet have test results, you should remain under home isolation and follow the below guidelines:    Step 1: Stay home and away from others until at least 24 hours after both:  Your symptoms are getting better overall, and  You have not had a fever (and are not using fever-reducing medication)    Step 2: Resume normal activities, and use added prevention strategies over the next five days. Clean your hands often, wear a well-fitting mask when around others and keep a distance from others. Since some people remain contagious beyond the “stay-at-home” period, taking added precautions can lower the chance of spreading respiratory viruses to others.    Follow your employer’s specific return to work policies as the above guidelines may not apply in all settings.     Talk to Your Healthcare Provider    If you test positive for COVID-19, notify your healthcare provider as soon as possible to discuss potential treatment options. Patients who are 65 years or older, immunocompromised, or have other high-risk conditions are candidates for oral antiviral treatment, such as paxlovid and molnupiravir. These treatments, when appropriate, should be started as early as possible to prevent mild symptoms from  becoming severe.     Seek Further Care  If your symptoms do not improve after one week, contact your healthcare provider. If you develop symptoms such as: extreme weakness, difficult breathing, persistent pain or pressure in the chest, or other symptoms that are severe or concerning to you, you should contact your healthcare provider or seek emergency medical attention.     10 Ways to Manage Your Health at Home    Stay home from work, school, and other public places. If you must go out, avoid using any kind of public transportation, ridesharing, or taxis.  If you cannot avoid using public transportation always wear a mask.  Carefully monitor your symptoms. If you are 65 years or older or have a high-risk condition don’t wait for symptoms to become severe to contact your healthcare provider.  If your symptoms get worse, call your healthcare provider immediately.   Get rest and stay hydrated.  If you have a medical appointment, call the healthcare provider ahead of time and tell them that you have or may have COVID-19.  For medical emergencies, call 911 and notify the dispatch personnel that you have or may have COVID-19.  Cover your cough and sneeze.  Wash your hands often with soap and water for at least 20 seconds or clean your hands with an alcohol-based hand  that contains at least 60% alcohol.  As much as possible, stay in a specific room and away from other people in your home. You should use a separate bathroom, if available. If you need to be around other people in or outside of the home, wear a facemask.  Avoid sharing personal items with other people in your household, like dishes, towels, and bedding.  Clean all surfaces that are touched often, like counters, tabletops, and doorknobs. Use household cleaning sprays or wipes according to the label instructions.    Additional Information    You can also get more information at the following websites:    Centers for Disease Control and Prevnetion  (CDC):  https://www.cdc.gov/respiratory-viruses/guidance/respiratory-virus-guidance.html      [facility]: https://[facility].[place]/topics-services/diseases-and-conditions/respiratory-disease/diseases/covid19.html

## (undated) NOTE — LETTER
10/20/2021          To Whom It May Concern:    Johan Moctezuma is currently under my medical care and may not return to work at this time. Please excuse Fede Lomeli for 3 days. She may return to work on 10/25/2021.     If you require additional informati

## (undated) NOTE — LETTER
AUTHORIZATION FOR SURGICAL OPERATION OR OTHER PROCEDURE    1.  I hereby authorize OSCAR Davidson, and [facility] staff assigned to my case to perform the following operation and/or procedure at the [facility]:    Cortisone i Date:  ______/______/_____                    Time:  ________ A. M.  P.M.        Patient Name:  ______________________________________________________  (please print)      Patient signature:  ___________________________________________________             Re

## (undated) NOTE — LETTER
[address], [facility]   Date:   10/29/2021     Name:   Jer De La Garza    YOB: 1965   MRN:   [id number]       WHERE IS YOUR PAIN NOW?   Ranjan the areas on your body where you feel the de

## (undated) NOTE — LETTER
11/9/2022          To Whom It May Concern:    Juan Carlos Winchester is currently under my medical care and may not return to work at this time. Please excuse Benjy April for 2 days. She may return to work on 11/11/22. Activity is restricted as follows: none. If you require additional information please contact our office.         Sincerely,    Sacha Harry MD          Document generated by:  Sacha Harry MD

## (undated) NOTE — LETTER
April 13, 2018         Larissa MD Jhonny  [address] [place] [address]      Patient: Per Fuentes   YOB: 1965   Date of Visit: 4/13/2018       Dear Dr. Stanton Navarro MD,    I saw your patient, Per Fuentes, on 4/13/2018

## (undated) NOTE — LETTER
3/10/2022          To Whom It May Concern:    Chika Brower is currently under my medical care and may not return to work at this time. Please excuse Tab Blackman for 2 days. She may return to work on 3/14/2022. If you require additional information please contact our office.         Sincerely,    Amelia Bolden MD          Document generated by:  Amelia Bolden MD

## (undated) NOTE — LETTER
12/5/2022              [facility] [id number]         To Whom It May Concern,      Zhou Dhillon was seen in my office today, 12/5/22, for an appointment. Please excuse her from work for this time. Please contact my office with any questions. Sincerely,    Mario Alberto Gray.  Soren Mooney MD  [address] [place] [address]  [address] [facility]  [phone] [facility] [address] [phone]

## (undated) NOTE — LETTER
Date & Time: 12/19/2020, 2:40 PM  Patient: Thomas Camera  Encounter Provider(s):    Bernard White MD       To Whom It May Concern:    April Levine was seen and treated in our department on 12/19/2020.  She should not return to work until

## (undated) NOTE — LETTER
MINOR CASE LETTER      4/5/2019        Dear Cezar Raines are having a hysteroscopy with myosure polypectomy on 4/10/19  at 12:30pm.    Do not eat or drink anything (including water) after midnight the night before surgery. If your procedure is scheduled later in the day, then nothing by mouth for 8 hours before arrival to the hospital.    Ina Farah are to call this office if you have any cold or flu symptoms 2 days before your scheduled surgery. Please avoid ALL  Aspirin/herbal supplements 7 days before surgery. Avoid Ibuprofen, Motrin, Aleve, or Naprosyn for 3 days before surgery. You will be contacted by PreAdmission Testing (PAT) usually within the week before surgery. They will take a short medical history and let you know if any preoperative testing is needed. Ambulatory surgery will contact you Tuesday evening to give you a specific time/location to arrive at [address] [place] [address] to let them know you are having procedure 91388 done. Call our office if any pre-certification or pre-authorization is required. If you have an HMO or EPO insurance, we will initiate the referral for you. Please make arrangements for someone to drive you home after your surgery. Call our office now to schedule your post-operative appointment for 2 weeks after surgery. Please feel free to contact our office at [phone] if you have any questions regarding these instructions or your procedure. Sincerely,      Jeovany Simmons.  MD Nancy  [address] [place] [address], [address]  [phone]    Document electronically generated by:  Oscar Ware

## (undated) NOTE — LETTER
ASTHMA ACTION PLAN for Karen Damon     : 1965     Date: 24  Doctor:  Jeison Dale DO  Phone for doctor or clinic: [facility], [address] [address] [phone]      ACT Score: 25    ACT Goal: 20 or greater    Call your provider if you require your rescue/quick reliever medication more than 2-3 times in a 24 hour period.    If you require your rescue inhaler/medication more than 2-3 times weekly, your asthma may not be under proper control and you should seek medical attention.    *Quick Relievers are Xopenex and Albuterol*    You can use the colors of a traffic light to help learn about your asthma medicines.  Year Round       1. Green - Go! % of Personal Best Peak Flow   Use controller medicine.   Breathing is good  No cough or wheeze  Can work and play Medicine How much to take When to take it    Medications       Sympathomimetics Instructions     albuterol (PROAIR HFA) 108 (90 Base) MCG/ACT Inhalation Aero Soln Inhale 2 puffs into the lungs every 4 (four) hours as needed for Wheezing.     Budesonide-Formoterol Fumarate (SYMBICORT) 80-4.5 MCG/ACT Inhalation Aerosol Inhale 2 puffs into the lungs 2 (two) times daily.     albuterol sulfate (2.5 MG/3ML) 0.083% Inhalation Nebu Soln Take 3 mL (2.5 mg total) by nebulization every 4 (four) hours as needed for Wheezing.                    2. Yellow - Caution. 50-79% Personal Best Peak Flow  Use reliever medicine to keep an asthma attack from getting bad.   Cough  Quick Relievers  Wheezing  Tight Chest  Wake up at night Medicine How much to take When to take it    If symptoms are not improving in 24-48 hrs, call office for further instructions  Medications       Sympathomimetics Instructions     albuterol (PROAIR HFA) 108 (90 Base) MCG/ACT Inhalation Aero Soln Inhale 2 puffs into the lungs every 4 (four) hours as needed for Wheezing.     Budesonide-Formoterol Fumarate  (SYMBICORT) 80-4.5 MCG/ACT Inhalation Aerosol Inhale 2 puffs into the lungs 2 (two) times daily.     albuterol sulfate (2.5 MG/3ML) 0.083% Inhalation Nebu Soln Take 3 mL (2.5 mg total) by nebulization every 4 (four) hours as needed for Wheezing.                    3. Red - Stop! Danger! <50% Personal Best Peak Flow  Continue Controller Medications But ADD:   Medicine not helping  Breathing is hard and fast  Nose opens wide  Can't walk  Ribs show  Can't talk well Medicine How much to take When to take it    If your symptoms do not improve in ONE hour -  go to the emergency room or call 911 immediately! If symptoms improve, call office for appointment immediately.    Albuterol inhaler 2 puffs every 20 minutes for three treatments       Don't forget:  Rinse mouth after using inhaler  Use spacer for inhaler  Remember to get your Flu vaccine every fall!    [x] Asthma Action Plan reviewed with the caregiver and patient, and a copy of the plan was given to the patient/caregiver.   [] Asthma Action Plan reviewed with the caregiver and patient on the phone, and copy mailed to patient/caregiver or sent via [employer].     Signatures:   Provider  Jeison Dale, DO Patient  Karen Tracee Damon Caretaker

## (undated) NOTE — LETTER
[facility]  [address], [place], [place]    Authorization for Surgical Operation and Procedure                               I hereby authorize JOSEE Lucio MD, my physician and his/her assistants (if applicable), which may include medical students, residents, and/or fellows, to perform the following surgical operation/ procedure and administer such anesthesia as may be determined necessary by my physician: Operation/Procedure name (s) COLONOSCOPY on Karen Damon   2.   I recognize that during the surgical operation/procedure, unforeseen conditions may necessitate additional or different procedures than those listed above.  I, therefore, further authorize and request that the above-named surgeon, assistants, or designees perform such procedures as are, in their judgment, necessary and desirable.    3.   My surgeon/physician has discussed prior to my surgery the potential benefits, risks and side effects of this procedure; the likelihood of achieving goals; and potential problems that might occur during recuperation.  They also discussed reasonable alternatives to the procedure, including risks, benefits, and side effects related to the alternatives and risks related to not receiving this procedure.  I have had all my questions answered and I acknowledge that no guarantee has been made as to the result that may be obtained.    4.   Should the need arise during my operation/procedure, which includes change of level of care prior to discharge, I also consent to the administration of blood and/or blood products.  Further, I understand that despite careful testing and screening of blood or blood products by collecting agencies, I may still be subject to ill effects as a result of receiving a blood transfusion and/or blood products.  The following are some, but not all, of the potential risks that can occur: fever and allergic reactions, hemolytic reactions, transmission of diseases  such as Hepatitis, AIDS and Cytomegalovirus (CMV) and fluid overload.  In the event that I wish to have an autologous transfusion of my own blood, or a directed donor transfusion, I will discuss this with my physician.  Check only if Refusing Blood or Blood Products  I understand refusal of blood or blood products as deemed necessary by my physician may have serious consequences to my condition to include possible death. I hereby assume responsibility for my refusal and release the hospital, its personnel, and my physicians from any responsibility for the consequences of my refusal.    o  Refuse   5.   I authorize the use of any specimen, organs, tissues, body parts or foreign objects that may be removed from my body during the operation/procedure for diagnosis, research or teaching purposes and their subsequent disposal by hospital authorities.  I also authorize the release of specimen test results and/or written reports to my treating physician on the hospital medical staff or other referring or consulting physicians involved in my care, at the discretion of the Pathologist or my treating physician.    6.   I consent to the photographing or videotaping of the operations or procedures to be performed, including appropriate portions of my body for medical, scientific, or educational purposes, provided my identity is not revealed by the pictures or by descriptive texts accompanying them.  If the procedure has been photographed/videotaped, the surgeon will obtain the original picture, image, videotape or CD.  The hospital will not be responsible for storage, release or maintenance of the picture, image, tape or CD.    7.   I consent to the presence of a  or observers in the operating room as deemed necessary by my physician or their designees.    8.   I recognize that in the event my procedure results in extended X-Ray/fluoroscopy time, I may develop a skin reaction.    9. If I have a Do Not Attempt  Resuscitation (DNAR) order in place, that status will be suspended while in the operating room, procedural suite, and during the recovery period unless otherwise explicitly stated by me (or a person authorized to consent on my behalf). The surgeon or my attending physician will determine when the applicable recovery period ends for purposes of reinstating the DNAR order.  10. Patients having a sterilization procedure: I understand that if the procedure is successful the results will be permanent and it will therefore be impossible for me to inseminate, conceive, or bear children.  I also understand that the procedure is intended to result in sterility, although the result has not been guaranteed.   11. I acknowledge that my physician has explained sedation/analgesia administration to me including the risk and benefits I consent to the administration of sedation/analgesia as may be necessary or desirable in the judgment of my physician.    I CERTIFY THAT I HAVE READ AND FULLY UNDERSTAND THE ABOVE CONSENT TO OPERATION and/or OTHER PROCEDURE.     ____________________________________  _________________________________        ______________________________  Signature of Patient    Signature of Responsible Person                Printed Name of Responsible Person                                      ____________________________________  _____________________________                ________________________________  Signature of Witness        Date  Time         Relationship to Patient    STATEMENT OF PHYSICIAN My signature below affirms that prior to the time of the procedure; I have explained to the patient and/or his/her legal representative, the risks and benefits involved in the proposed treatment and any reasonable alternative to the proposed treatment. I have also explained the risks and benefits involved in refusal of the proposed treatment and alternatives to the proposed treatment and have answered the patient's  questions. If I have a significant financial interest in a co-management agreement or a significant financial interest in any product or implant, or other significant relationship used in this procedure/surgery, I have disclosed this and had a discussion with my patient.     _____________________________________________________              _____________________________  (Signature of Physician)                                                                                         (Date)                                   (Time)  Patient Name: Karen Damon      : 1965      Printed: 2024     Medical Record #: [id number]                                      Page 1 of 1

## (undated) NOTE — LETTER
9/13/2022          To Whom It May Concern:    Chika Brower is currently under my medical care and may return to work with the following restrictions: no lifting more than 35 lbs for the next 6 weeks. If you require additional information please contact our office.         Sincerely,    Roxann Delgado MD